# Patient Record
Sex: MALE | Race: WHITE | NOT HISPANIC OR LATINO | Employment: UNEMPLOYED | URBAN - NONMETROPOLITAN AREA
[De-identification: names, ages, dates, MRNs, and addresses within clinical notes are randomized per-mention and may not be internally consistent; named-entity substitution may affect disease eponyms.]

---

## 2017-02-01 ENCOUNTER — COMMUNICATION - GICH (OUTPATIENT)
Dept: ORTHOPEDICS | Facility: OTHER | Age: 57
End: 2017-02-01

## 2017-03-16 ENCOUNTER — AMBULATORY - GICH (OUTPATIENT)
Dept: FAMILY MEDICINE | Facility: OTHER | Age: 57
End: 2017-03-16

## 2017-03-16 ENCOUNTER — COMMUNICATION - GICH (OUTPATIENT)
Dept: FAMILY MEDICINE | Facility: OTHER | Age: 57
End: 2017-03-16

## 2017-03-16 DIAGNOSIS — F32.9 MAJOR DEPRESSIVE DISORDER, SINGLE EPISODE: ICD-10-CM

## 2017-03-22 ENCOUNTER — HISTORY (OUTPATIENT)
Dept: EMERGENCY MEDICINE | Facility: OTHER | Age: 57
End: 2017-03-22

## 2017-03-30 ENCOUNTER — AMBULATORY - GICH (OUTPATIENT)
Dept: FAMILY MEDICINE | Facility: OTHER | Age: 57
End: 2017-03-30

## 2017-04-10 ENCOUNTER — OFFICE VISIT - GICH (OUTPATIENT)
Dept: FAMILY MEDICINE | Facility: OTHER | Age: 57
End: 2017-04-10

## 2017-04-10 ENCOUNTER — HISTORY (OUTPATIENT)
Dept: FAMILY MEDICINE | Facility: OTHER | Age: 57
End: 2017-04-10

## 2017-04-10 DIAGNOSIS — F32.9 MAJOR DEPRESSIVE DISORDER, SINGLE EPISODE: ICD-10-CM

## 2017-04-10 DIAGNOSIS — Z48.02 ENCOUNTER FOR REMOVAL OF SUTURES: ICD-10-CM

## 2017-04-10 DIAGNOSIS — K21.9 GASTRO-ESOPHAGEAL REFLUX DISEASE WITHOUT ESOPHAGITIS: ICD-10-CM

## 2017-04-10 ASSESSMENT — PATIENT HEALTH QUESTIONNAIRE - PHQ9: SUM OF ALL RESPONSES TO PHQ QUESTIONS 1-9: 7

## 2017-04-18 ENCOUNTER — COMMUNICATION - GICH (OUTPATIENT)
Dept: ORTHOPEDICS | Facility: OTHER | Age: 57
End: 2017-04-18

## 2017-05-02 ENCOUNTER — HISTORY (OUTPATIENT)
Dept: EMERGENCY MEDICINE | Facility: OTHER | Age: 57
End: 2017-05-02

## 2017-11-06 ENCOUNTER — HISTORY (OUTPATIENT)
Dept: PEDIATRICS | Facility: OTHER | Age: 57
End: 2017-11-06

## 2017-11-06 ENCOUNTER — OFFICE VISIT - GICH (OUTPATIENT)
Dept: PEDIATRICS | Facility: OTHER | Age: 57
End: 2017-11-06

## 2017-11-06 DIAGNOSIS — Z23 ENCOUNTER FOR IMMUNIZATION: ICD-10-CM

## 2017-11-06 DIAGNOSIS — K57.32 DIVERTICULITIS OF LARGE INTESTINE WITHOUT PERFORATION OR ABSCESS WITHOUT BLEEDING: ICD-10-CM

## 2017-11-06 DIAGNOSIS — K59.00 CONSTIPATION: ICD-10-CM

## 2017-11-06 DIAGNOSIS — F10.99 ALCOHOL USE WITH ALCOHOL-INDUCED DISORDER (H): ICD-10-CM

## 2017-11-06 ASSESSMENT — PATIENT HEALTH QUESTIONNAIRE - PHQ9: SUM OF ALL RESPONSES TO PHQ QUESTIONS 1-9: 2

## 2017-11-27 ENCOUNTER — AMBULATORY - GICH (OUTPATIENT)
Dept: FAMILY MEDICINE | Facility: OTHER | Age: 57
End: 2017-11-27

## 2017-12-11 ENCOUNTER — OFFICE VISIT - GICH (OUTPATIENT)
Dept: FAMILY MEDICINE | Facility: OTHER | Age: 57
End: 2017-12-11

## 2017-12-11 ENCOUNTER — HISTORY (OUTPATIENT)
Dept: FAMILY MEDICINE | Facility: OTHER | Age: 57
End: 2017-12-11

## 2017-12-11 DIAGNOSIS — R10.32 LEFT LOWER QUADRANT PAIN: ICD-10-CM

## 2017-12-11 LAB
A/G RATIO - HISTORICAL: 0.9 (ref 1–2)
ABSOLUTE BASOPHILS - HISTORICAL: 0 THOU/CU MM
ABSOLUTE EOSINOPHILS - HISTORICAL: 0.1 THOU/CU MM
ABSOLUTE IMMATURE GRANULOCYTES(METAS,MYELOS,PROS) - HISTORICAL: 0 THOU/CU MM
ABSOLUTE LYMPHOCYTES - HISTORICAL: 1.3 THOU/CU MM (ref 0.9–2.9)
ABSOLUTE MONOCYTES - HISTORICAL: 1 THOU/CU MM
ABSOLUTE NEUTROPHILS - HISTORICAL: 4.9 THOU/CU MM (ref 1.7–7)
ALBUMIN SERPL-MCNC: 3.9 G/DL (ref 3.5–5.7)
ALP SERPL-CCNC: 72 IU/L (ref 34–104)
ALT (SGPT) - HISTORICAL: 16 IU/L (ref 7–52)
ANION GAP - HISTORICAL: 11 (ref 5–18)
AST SERPL-CCNC: 19 IU/L (ref 13–39)
BASOPHILS # BLD AUTO: 0.5 %
BILIRUB SERPL-MCNC: 0.4 MG/DL (ref 0.3–1)
BUN SERPL-MCNC: 16 MG/DL (ref 7–25)
BUN/CREAT RATIO - HISTORICAL: 12
CALCIUM SERPL-MCNC: 8.7 MG/DL (ref 8.6–10.3)
CHLORIDE SERPLBLD-SCNC: 98 MMOL/L (ref 98–107)
CO2 SERPL-SCNC: 24 MMOL/L (ref 21–31)
CREAT SERPL-MCNC: 1.38 MG/DL (ref 0.7–1.3)
EOSINOPHIL NFR BLD AUTO: 1.4 %
ERYTHROCYTE [DISTWIDTH] IN BLOOD BY AUTOMATED COUNT: 20 % (ref 11.5–15.5)
GFR IF NOT AFRICAN AMERICAN - HISTORICAL: 53 ML/MIN/1.73M2
GLOBULIN - HISTORICAL: 4.2 G/DL (ref 2–3.7)
GLUCOSE SERPL-MCNC: 92 MG/DL (ref 70–105)
HCT VFR BLD AUTO: 34.4 % (ref 37–53)
HEMOGLOBIN: 10.7 G/DL (ref 13.5–17.5)
IMMATURE GRANULOCYTES(METAS,MYELOS,PROS) - HISTORICAL: 0.1 %
LYMPHOCYTES NFR BLD AUTO: 18 % (ref 20–44)
MCH RBC QN AUTO: 21.5 PG (ref 26–34)
MCHC RBC AUTO-ENTMCNC: 31.1 G/DL (ref 32–36)
MCV RBC AUTO: 69 FL (ref 80–100)
MONOCYTES NFR BLD AUTO: 13.1 %
NEUTROPHILS NFR BLD AUTO: 66.9 % (ref 42–72)
PLATELET # BLD AUTO: 345 THOU/CU MM (ref 140–440)
PMV BLD: 9.6 FL (ref 6.5–11)
POTASSIUM SERPL-SCNC: 3.9 MMOL/L (ref 3.5–5.1)
PROT SERPL-MCNC: 8.1 G/DL (ref 6.4–8.9)
RED BLOOD COUNT - HISTORICAL: 4.97 MIL/CU MM (ref 4.3–5.9)
SODIUM SERPL-SCNC: 133 MMOL/L (ref 133–143)
WHITE BLOOD COUNT - HISTORICAL: 7.3 THOU/CU MM (ref 4.5–11)

## 2017-12-19 ENCOUNTER — HOSPITAL ENCOUNTER (OUTPATIENT)
Dept: RADIOLOGY | Facility: OTHER | Age: 57
End: 2017-12-19
Attending: FAMILY MEDICINE

## 2017-12-19 DIAGNOSIS — R10.32 LEFT LOWER QUADRANT PAIN: ICD-10-CM

## 2017-12-27 NOTE — PROGRESS NOTES
Patient Information     Patient Name MRN Sex Augie Alan [id number] Male 1960      Progress Notes by Eric Kaiser MD at 2017  3:15 PM     Author:  Eric Kaiser MD Service:  (none) Author Type:  Physician     Filed:  2017  4:09 PM Encounter Date:  2017 Status:  Signed     :  Eric Kaiser MD (Physician)            Subjective  Augie De Jesus is a 57 y.o. male who presents for left-sided abdominal pain. Present about a week or so. It's a constant pain along the left side of his abdomen. Worse with lifting his leg and better with nothing. No change with eating or bowel movement. No fall or trauma. No fever. No vomiting. No dysuria. He reports drinking alcohol last weekend. Whenever he drinks usually gets pain in the right upper quadrant which goes away. He has a bowel movement every day which she's had to push more. No red or black stools. He has a hemorrhoid. He's not yet had his first screening colonoscopy. His sister recently passed away from cancer and so he's thinking about worst case scenarios.    Problem List/PMH: reviewed in EMR, and made relevant updates today.  Medications: reviewed in EMR, and made relevant updates today.  Allergies: reviewed in EMR, and made relevant updates today.    Social Hx:  Social History      Substance Use Topics        Smoking status:  Never Smoker     Smokeless tobacco:  Never Used     Alcohol use  0.0 oz/week      0 Standard drinks or equivalent per week       Social History Narrative    History of alcoholism and DWIs, recurrent treatment at [facility].                      I reviewed social history and made relevant updates today.    Family Hx:   Family History       Problem   Relation Age of Onset     Heart Disease  Mother       heart failure       Alcohol/Drug  Father      Cancer  Sister      uterine cancer         Objective  Vitals: reviewed in EMR.  BP (!) 152/108  Pulse (!) 114  Temp 99.1  F (37.3  C) (Tympanic)   Ht 1.676 m  "(5' 6\")  Wt 91.9 kg (202 lb 9.6 oz)  BMI 32.7 kg/m2    Gen: Pleasant male, NAD.  HEENT: MMM  Neck: Supple  Pulm: Breathing easily  Neuro: Grossly intact  Skin: No concerning lesions.  Psychiatric: Normal affect and insight. Does not appear anxious or depressed.  Abdomen: Soft, nondistended. No rebound or guarding. Bowel sounds are present. Mild tenderness to palpation in the right upper quadrant without masses. Tenderness along the left lateral abdomen extending towards the left lower quadrant.      Assessment    ICD-10-CM    1. Diverticulitis of large intestine, unspecified bleeding status, unspecified complication status K57.32 ciprofloxacin HCl (CIPRO) 500 mg tablet      metroNIDAZOLE (FLAGYL) 500 mg tablet   2. Constipation, unspecified constipation type K59.00 polyethylene glycoL (MIRALAX) 17 gram/dose powder   3. Need for vaccination Z23 PNEUMOCOCCAL VACCINE 23-VALENT => 1 YO IM      FLU VACCINE => 3 YRS PF QUADRIVALENT IIV4 IM   4. Alcohol use disorder (HC) F10.99        I think he has both constipation and likely some diverticulitis. I recommend a course of antibiotics and if not improving return for repeat evaluation as further testing including CT abdomen would be warranted looking for abscess.    Plan   -- Expected clinical course discussed   -- Medications and their side effects discussed  Patient Instructions    -- Cipro & Flagyl x 10 days   -- Eat yogurt 1-2 times per day while on antibiotics (and for a few weeks after) to reduce the chances of diarrhea     -- Start polyethylene glycol (MiraLax) 2 capful two times a day for a few days, then cut back dose.  Most likely you'll be using 1 capful daily after a few days   -- Progression will be small hard pellet stool, hard log stool, soft stool.  Expect some liquid stool as well.  Goal soft formed daily stool (applesauce/peanutbutter consistency)   -- After 2-3 days, if you still feel constipated the next step up is to add Senna 1-2 tablets twice a day   " -- Use MiraLax daily for a month to allow colon to regain strength   -- Drink more water   -- Eat more fruits and vegetables   -- No fiber supplements until at least 1 month out.  Fiber containing foods okay.   -- MiraLax is safe to use indefinitely   -- After 1 month, consider switching from MiraLax to daily fiber supplement (eg Citrucel 1 tbsp daily).     -- Flu and pneumonia 23 shots today   -- Consider your screening colonoscopy    Return if symptoms worsen or fail to improve.    Signed, Eric Kaiser MD  Internal Medicine & Pediatrics

## 2017-12-28 NOTE — PATIENT INSTRUCTIONS
Patient Information     Patient Name MRN Sex Augie Alan [id number] Male 1960      Patient Instructions by Eric Kaiser MD at 2017  3:15 PM     Author:  Eric Kaiser MD  Service:  (none) Author Type:  Physician     Filed:  2017  3:54 PM  Encounter Date:  2017 Status:  Addendum     :  Eric Kaiser MD (Physician)        Related Notes: Original Note by Eric Kaiser MD (Physician) filed at 2017  3:51 PM             -- Cipro & Flagyl x 10 days   -- Eat yogurt 1-2 times per day while on antibiotics (and for a few weeks after) to reduce the chances of diarrhea     -- Start polyethylene glycol (MiraLax) 2 capful two times a day for a few days, then cut back dose.  Most likely you'll be using 1 capful daily after a few days   -- Progression will be small hard pellet stool, hard log stool, soft stool.  Expect some liquid stool as well.  Goal soft formed daily stool (applesauce/peanutbutter consistency)   -- After 2-3 days, if you still feel constipated the next step up is to add Senna 1-2 tablets twice a day   -- Use MiraLax daily for a month to allow colon to regain strength   -- Drink more water   -- Eat more fruits and vegetables   -- No fiber supplements until at least 1 month out.  Fiber containing foods okay.   -- MiraLax is safe to use indefinitely   -- After 1 month, consider switching from MiraLax to daily fiber supplement (eg Citrucel 1 tbsp daily).     -- Flu and pneumonia 23 shots today   -- Consider your screening colonoscopy

## 2017-12-30 NOTE — NURSING NOTE
Patient Information     Patient Name MRN Augie Mejia [id number] Male 1960      Nursing Note by Татьяна Vasquez at 2017  3:15 PM     Author:  Татьяна Vasquez Service:  (none) Author Type:  (none)     Filed:  2017  3:45 PM Encounter Date:  2017 Status:  Signed     :  Татьяна Vasquez            Patient presents to clinic for left sided abdominal pain that began over a week ago.  Denies fevers, constipation, vomiting or diarrhea.  Татьяна Vasquez LPN ....................  2017   3:20 PM

## 2018-01-02 ENCOUNTER — AMBULATORY - GICH (OUTPATIENT)
Dept: FAMILY MEDICINE | Facility: OTHER | Age: 58
End: 2018-01-02

## 2018-01-03 NOTE — TELEPHONE ENCOUNTER
Patient Information     Patient Name MRN Sex Aguie Aaln [id number] Male 1960      Telephone Encounter by Kristopher Martino RN at 3/16/2017 10:59 AM     Author:  Kristopher Martino RN Service:  (none) Author Type:  NURS- Registered Nurse     Filed:  3/16/2017 11:12 AM Encounter Date:  3/16/2017 Status:  Signed     :  Kristopher Martino RN (NURS- Registered Nurse)            This is a Refill request from: [facility]  Name of Medication: Cymbalta  Quantity requested: 30 capsules with 4 refills  Last fill date: 2/3/17  Due for refill: Yes, as per rx request  Last visit with PATRIZIA POWELL was on: 2016 in [facility]  PCP:  Patrizia Powell MD  Controlled Substance Agreement:  N/A   Diagnosis r/t this medication request: Unknown    Name of Medication: Lamictal  Quantity requested: 60 tabs with 4 refills  Last fill date: Unknown  Due for refill: Yes, as per patient medical messaging  Diagnosis r/t this medication request: Unknown    Chart review shows that last office visit with PCP noted to be on 16. Medications as listed above not noted in office visit notes on that date. However, patient was seen for a preop on 16 by PCP. Both requested medications are listed as historical on chart/active med list. Medical messaging on 3/16/17 from patient shows that patient is in need of a refill of lamictal and cymbalta. Will send rx request to pcp for his consideration and approval.     Unable to complete prescription refill per RN Medication Refill Policy.................... Kristopher Martino RN ....................  3/16/2017   11:00 AM

## 2018-01-18 ENCOUNTER — AMBULATORY - GICH (OUTPATIENT)
Dept: FAMILY MEDICINE | Facility: OTHER | Age: 58
End: 2018-01-18

## 2018-01-27 VITALS
WEIGHT: 199.6 LBS | DIASTOLIC BLOOD PRESSURE: 99 MMHG | HEIGHT: 66 IN | HEART RATE: 118 BPM | BODY MASS INDEX: 32.08 KG/M2 | SYSTOLIC BLOOD PRESSURE: 148 MMHG

## 2018-01-27 VITALS
HEART RATE: 114 BPM | HEIGHT: 66 IN | SYSTOLIC BLOOD PRESSURE: 150 MMHG | DIASTOLIC BLOOD PRESSURE: 98 MMHG | WEIGHT: 202.6 LBS | BODY MASS INDEX: 32.56 KG/M2 | TEMPERATURE: 99.1 F

## 2018-01-30 ASSESSMENT — PATIENT HEALTH QUESTIONNAIRE - PHQ9
SUM OF ALL RESPONSES TO PHQ QUESTIONS 1-9: 2
SUM OF ALL RESPONSES TO PHQ QUESTIONS 1-9: 7

## 2018-02-01 ENCOUNTER — DOCUMENTATION ONLY (OUTPATIENT)
Dept: FAMILY MEDICINE | Facility: OTHER | Age: 58
End: 2018-02-01

## 2018-02-01 RX ORDER — POLYETHYLENE GLYCOL 3350 17 G/17G
17 POWDER, FOR SOLUTION ORAL DAILY PRN
COMMUNITY
Start: 2017-11-06 | End: 2019-04-23

## 2018-02-01 RX ORDER — DULOXETIN HYDROCHLORIDE 60 MG/1
60 CAPSULE, DELAYED RELEASE ORAL DAILY
COMMUNITY
Start: 2017-04-10 | End: 2018-05-10

## 2018-02-01 RX ORDER — NICOTINE POLACRILEX 4 MG/1
20 GUM, CHEWING ORAL DAILY
COMMUNITY
Start: 2017-04-10 | End: 2018-04-13

## 2018-02-01 RX ORDER — TRAZODONE HYDROCHLORIDE 50 MG/1
50-100 TABLET, FILM COATED ORAL AT BEDTIME
COMMUNITY
Start: 2015-09-15 | End: 2019-04-23

## 2018-02-01 RX ORDER — ACETAMINOPHEN 500 MG
1000 TABLET ORAL 3 TIMES DAILY
COMMUNITY
Start: 2016-05-23 | End: 2019-04-23

## 2018-02-01 RX ORDER — IBUPROFEN 600 MG/1
600 TABLET, FILM COATED ORAL EVERY 6 HOURS
COMMUNITY
Start: 2016-05-23 | End: 2019-04-23

## 2018-02-09 VITALS
SYSTOLIC BLOOD PRESSURE: 128 MMHG | WEIGHT: 203 LBS | BODY MASS INDEX: 32.77 KG/M2 | HEART RATE: 108 BPM | DIASTOLIC BLOOD PRESSURE: 82 MMHG

## 2018-02-12 NOTE — PROGRESS NOTES
Patient Information     Patient Name MRN Sex Augie Alan [id number] Male 1960      Progress Notes by Carmenza Irizarry at 2017  9:55 AM     Author:  Carmenza Irizarry Service:  (none) Author Type:  Other Clinical Staff     Filed:  2017  9:55 AM Date of Service:  2017  9:55 AM Status:  Signed     :  Carmenza Irizarry (Other Clinical Staff)            1.  Has the patient had a previous reaction to IV contrast? No    2.  Does the patient have kidney disease? No    3.  Is the patient on dialysis? No    If YES to any of these questions, exam will be reviewed with a Radiologist before administering contrast.

## 2018-02-12 NOTE — PROGRESS NOTES
Patient Information     Patient Name MRN Sex Augie Alan [id number] Male 1960      Progress Notes by Carmenza Irizarry at 2017  9:55 AM     Author:  Carmenza Irizarry Service:  (none) Author Type:  Other Clinical Staff     Filed:  2017  9:55 AM Date of Service:  2017  9:55 AM Status:  Signed     :  Carmenza Irizarry (Other Clinical Staff)            Falls Risk Criteria:    Age 65 and older or under age 4        Sensory deficits    Poor vision    Use of ambulatory aides    Impaired judgment    Unable to walk independently    Meets High Risk criteria for falls:  no

## 2018-02-12 NOTE — NURSING NOTE
Patient Information     Patient Name MRN Sex Augie Alan [id number] Male 1960      Nursing Note by Nasrin Riddle at 2017  2:45 PM     Author:  Nasrin Riddle Service:  (none) Author Type:  (none)     Filed:  2017  3:00 PM Encounter Date:  2017 Status:  Signed     :  Nasrin Riddle            Patient presents today with left sided abd pain. He was see by Dr Kaiser for this on , but is still having pain.  Nasrin Riddle LPN .............2017  2:45 PM

## 2018-02-12 NOTE — PROGRESS NOTES
Patient Information     Patient Name MRN Sex Augie Alan [id number] Male 1960      Progress Notes by Thiago Ocasio MD at 2017  2:45 PM     Author:  Thiago Ocasio MD Service:  (none) Author Type:  Physician     Filed:  2017  3:05 PM Encounter Date:  2017 Status:  Signed     :  Thiago Ocasio MD (Physician)            SUBJECTIVE:  Augie De Jesus is a 57 y.o. male here for follow-up. He was seen at the end of November for left lower quadrant pain. It was thought that he had diverticulitis at that time. He completed Flagyl and Cipro which seemed to help however his symptoms have since returned. He feels a stabbing sensation in his left lower quadrant that is worsened with certain movements. He has notany change in his bowels or bladder. No previous history of abdominal surgeries. No fevers or chills.      Patient Active Problem List      Diagnosis Date Noted     CMC arthritis-bilateral 10/24/2016     Primary osteoarthritis of both first carpometacarpal joints 10/20/2016     Status post total right knee replacement 2016     YARELIS (obstructive sleep apnea) 2015     S/P total knee arthroplasty 2015     Medial meniscus tear 2015     Chondromalacia of knee 2015     EXTERNAL HEMORRHOIDS 2011     GASTROESOPHAGEAL REFLUX DISEASE 10/15/2010     HYPERTENSION 2010     Osteoarthrosis, unspecified whether generalized or localized, involving lower leg 2010     DEPRESSION, MAJOR 2010       Past Medical History:     Diagnosis  Date     Alcoholism (HC)     treatment fall .      MORALES, SECOND DEGREE, MULTIPLE SITES 3/22/2012     Chondromalacia of knee 2015     CMC arthritis-bilateral 10/24/2016     DEPRESSION, MAJOR 3/24/2010     EXTERNAL HEMORRHOIDS 2011     GASTROESOPHAGEAL REFLUX DISEASE 10/15/2010     HYPERTENSION 2010     Medial meniscus tear 2015     YARELIS (obstructive sleep apnea)      uses CPAP       OSTEOARTHRITIS, KNEES, BILATERAL 4/28/2010     Pancreatitis, alcoholic 07/2009    Pancreatitis July 2009, likely related to alcohol      Primary osteoarthritis of both first carpometacarpal joints 10/20/2016     SCIATICA, RIGHT 3/24/2010     SINUS TACHYCARDIA 5/4/2011       Past Surgical History:      Procedure  Laterality Date     KNEE REPLACEMENT Left 6/25/15    Knee Replacement, Total Dr. Hernandez         Current Outpatient Prescriptions       Medication  Sig Dispense Refill     acetaminophen (TYLENOL EXTRA STRGTH) 500 mg tablet Take 2 tablets by mouth 3 times daily. Max acetaminophen dose: 4000mg in 24 hrs. 100 tablet 3     DULoxetine (CYMBALTA) 60 mg Delayed-release capsule Take 1 capsule by mouth once daily. 30 capsule 11     ibuprofen (ADVIL; MOTRIN) 600 mg tablet Take 1 tablet by mouth every 6 hours. Maximum of 3200 mg in 24 hours. 100 tablet 3     Omeprazole 20 mg tablet Take 1 tablet by mouth once daily. 90 tablet 3     polyethylene glycoL (MIRALAX) 17 gram/dose powder Take 17 g by mouth once daily if needed for Constipation. 1 jar 11     traZODone (DESYREL) 50 mg tablet Take 1-2 tablets by mouth at bedtime.  0     No current facility-administered medications for this visit.      Medications have been reviewed by me and are current to the best of my knowledge and ability.      Allergies:  No Known Allergies    Family History       Problem   Relation Age of Onset     Heart Disease  Mother       heart failure       Alcohol/Drug  Father      Cancer  Sister      uterine cancer         Social History      Substance Use Topics        Smoking status:  Never Smoker     Smokeless tobacco:  Never Used     Alcohol use  0.0 oz/week     0 Standard drinks or equivalent per week        ROS:    As above otherwise ROS is unremarkable.      OBJECTIVE:  /82  Pulse (!) 108  Wt 92.1 kg (203 lb)  BMI 32.77 kg/m2    EXAM:  General Appearance: Pleasant, alert, appropriate appearance for age. No acute  distress  Gastrointestinal: He has tenderness to palpation over his left lower quadrant. No rebound or guarding. No hepatosplenomegaly. Positive bowel sounds. No CVA tenderness.    ASSESSEMENT AND PLAN:    Augie was seen today for follow up.    Diagnoses and all orders for this visit:    LLQ pain  -     CBC WITH DIFFERENTIAL; Future  -     COMPLETE METABOLIC PANEL; Future  -     CT ABDOMEN PELVIS W; Future     we'll check a blood count, specifically looking at his white count. We'll also get kidney function. Due to him not completely improving despite using antibiotics will get a CT scan of his abdomen and pelvis. I'll contact him with all these results when they return.    She also needs to have his stress test rescheduled.       Berto Ocasio MD

## 2018-02-20 ENCOUNTER — DOCUMENTATION ONLY (OUTPATIENT)
Dept: FAMILY MEDICINE | Facility: OTHER | Age: 58
End: 2018-02-20

## 2018-02-22 ENCOUNTER — OFFICE VISIT (OUTPATIENT)
Dept: FAMILY MEDICINE | Facility: OTHER | Age: 58
End: 2018-02-22
Attending: FAMILY MEDICINE
Payer: COMMERCIAL

## 2018-02-22 VITALS
TEMPERATURE: 98.8 F | SYSTOLIC BLOOD PRESSURE: 139 MMHG | BODY MASS INDEX: 33.49 KG/M2 | HEIGHT: 66 IN | WEIGHT: 208.4 LBS | DIASTOLIC BLOOD PRESSURE: 88 MMHG | HEART RATE: 100 BPM

## 2018-02-22 DIAGNOSIS — Z01.818 PREOP GENERAL PHYSICAL EXAM: Primary | ICD-10-CM

## 2018-02-22 DIAGNOSIS — G47.33 OSA (OBSTRUCTIVE SLEEP APNEA): ICD-10-CM

## 2018-02-22 DIAGNOSIS — I10 ESSENTIAL HYPERTENSION: ICD-10-CM

## 2018-02-22 PROBLEM — F10.21 ALCOHOL DEPENDENCE IN REMISSION (H): Status: ACTIVE | Noted: 2018-02-22

## 2018-02-22 PROCEDURE — 99215 OFFICE O/P EST HI 40 MIN: CPT | Performed by: FAMILY MEDICINE

## 2018-02-22 PROCEDURE — G0463 HOSPITAL OUTPT CLINIC VISIT: HCPCS

## 2018-02-22 ASSESSMENT — ANXIETY QUESTIONNAIRES
7. FEELING AFRAID AS IF SOMETHING AWFUL MIGHT HAPPEN: NOT AT ALL
1. FEELING NERVOUS, ANXIOUS, OR ON EDGE: NOT AT ALL
3. WORRYING TOO MUCH ABOUT DIFFERENT THINGS: NOT AT ALL
6. BECOMING EASILY ANNOYED OR IRRITABLE: NOT AT ALL
GAD7 TOTAL SCORE: 0
5. BEING SO RESTLESS THAT IT IS HARD TO SIT STILL: NOT AT ALL
IF YOU CHECKED OFF ANY PROBLEMS ON THIS QUESTIONNAIRE, HOW DIFFICULT HAVE THESE PROBLEMS MADE IT FOR YOU TO DO YOUR WORK, TAKE CARE OF THINGS AT HOME, OR GET ALONG WITH OTHER PEOPLE: NOT DIFFICULT AT ALL
2. NOT BEING ABLE TO STOP OR CONTROL WORRYING: NOT AT ALL

## 2018-02-22 ASSESSMENT — PATIENT HEALTH QUESTIONNAIRE - PHQ9: 5. POOR APPETITE OR OVEREATING: NOT AT ALL

## 2018-02-22 NOTE — PROGRESS NOTES
[facility]  [address] [address]  [phone]    PRE-OP EVALUATION:  Today's date: 2018    Augie De Jesus (: 1960) presents for pre-operative evaluation assessment as requested by Surgery.  He requires evaluation and anesthesia risk assessment prior to undergoing surgery/procedure.    Proposed Surgery/ Procedure: Colonoscopy  Date of Surgery/ Procedure: TBD  Time of Surgery/ Procedure: TBD  Hospital/Surgical Facility: [facility]  Primary Physician: Thiago Ocasio  Type of Anesthesia Anticipated: to be determined    Patient has a Health Care Directive or Living Will:  NO    1. NO - Do you have a history of heart attack, stroke, stent, bypass or surgery on an artery in the head, neck, heart or legs?  2. NO - Do you ever have any pain or discomfort in your chest?  3. NO - Do you have a history of  Heart Failure?  4. YES - Are you troubled by shortness of breath when: walking on the level, up a slight hill or at night?  5. NO - Do you currently have a cold, bronchitis or other respiratory infection?  6. NO - Do you have a cough, shortness of breath or wheezing?  7. NO - Do you sometimes get pains in the calves of your legs when you walk?  8. NO - Do you or anyone in your family have previous history of blood clots?  9. NO - Do you or does anyone in your family have a serious bleeding problem such as prolonged bleeding following surgeries or cuts?  10. NO - Have you ever had problems with anemia or been told to take iron pills?  11. NO - Have you had any abnormal blood loss such as black, tarry or bloody stools, or abnormal vaginal bleeding?  12. NO - Have you ever had a blood transfusion?  13. NO - Have you or any of your relatives ever had problems with anesthesia?  14. YES - Do you have sleep apnea, excessive snoring or daytime drowsiness?  15. NO - Do you have any prosthetic heart valves?  16. YES(bilateral knees) - Do you have prosthetic joints?  17. NO - Is there  any chance that you may be pregnant?      HPI:     HPI related to upcoming procedure: Colonoscopy    He is planning to go through a colonoscopy for screening purposes.  He has never had a colonoscopy before.    He has a history of sleep apnea.  He has tried 5 different masks and has not tolerated it well.  Currently he is not using anything.    He has a history of hypertension but has not had his lipids checked in the last couple of years.  He will follow-up for fasting lipids at his convenience.    MEDICAL HISTORY:     Patient Active Problem List    Diagnosis Date Noted     Alcohol dependence in remission (H) 02/22/2018     Priority: Medium     CMC arthritis 10/24/2016     Priority: Medium     Primary osteoarthritis of both first carpometacarpal joints 10/20/2016     Priority: Medium     Status post total right knee replacement 05/19/2016     Priority: Medium     YARELIS (obstructive sleep apnea) 06/23/2015     Priority: Medium     S/P total knee arthroplasty 06/23/2015     Priority: Medium     Chondromalacia of knee 04/20/2015     Priority: Medium     Medial meniscus tear 04/20/2015     Priority: Medium     External hemorrhoids 05/04/2011     Priority: Medium     Gastroesophageal reflux disease 10/15/2010     Priority: Medium     Hypertension 05/12/2010     Priority: Medium     Osteoarthrosis involving lower leg 04/28/2010     Priority: Medium     Depression, major 03/24/2010     Priority: Medium      Past Medical History:   Diagnosis Date     Alcohol-induced acute pancreatitis without infection or necrosis     07/2009,Pancreatitis July 2009, likely related to alcohol     Chondromalacia of knee     4/20/2015     Essential (primary) hypertension     5/12/2010     Gastro-esophageal reflux disease without esophagitis     10/15/2010     Major depressive disorder, single episode     3/24/2010     Obstructive sleep apnea     uses CPAP     Other specified cardiac arrhythmias (CODE)     5/4/2011     Other tear of medial  "meniscus, current injury, unspecified knee, initial encounter     4/20/2015     Personal history of other medical treatment (CODE)     3/22/2012     Primary osteoarthritis of both first carpometacarpal joints     10/20/2016     Primary osteoarthritis of hand     10/24/2016     Primary osteoarthritis of one knee     4/28/2010     Residual hemorrhoidal skin tags     5/4/2011     Sciatica     3/24/2010     Uncomplicated alcohol dependence (H)     2012,treatment fall 2012.     Past Surgical History:   Procedure Laterality Date     ARTHROPLASTY KNEE      6/25/15,Knee Replacement, Total Dr. Hernandez     Current Outpatient Prescriptions   Medication Sig Dispense Refill     acetaminophen (TYLENOL) 500 MG tablet Take 1,000 mg by mouth 3 times daily Max acetaminophen dose: 4000mg in 24 hrs       DULoxetine (CYMBALTA) 60 MG EC capsule Take 60 mg by mouth daily       ibuprofen (ADVIL/MOTRIN) 600 MG tablet Take 600 mg by mouth every 6 hours Maximum of 3200 mg in 24 hrs       omeprazole (RA OMEPRAZOLE) 20 MG tablet Take 20 mg by mouth daily       polyethylene glycol (MIRALAX/GLYCOLAX) powder Take 17 g by mouth daily as needed for constipation       traZODone (DESYREL) 50 MG tablet Take  mg by mouth At Bedtime       OTC products: None, except as noted above    No Known Allergies   Latex Allergy: NO    Social History   Substance Use Topics     Smoking status: Never Smoker     Smokeless tobacco: Never Used     Alcohol use 0.0 oz/week     History   Drug Use Not on file     Comment: Drug use: No       REVIEW OF SYSTEMS:   CONSTITUTIONAL: NEGATIVE for fever, chills, change in weight  ENT/MOUTH: NEGATIVE for ear, mouth and throat problems  RESP: NEGATIVE for significant cough or SOB  CV: NEGATIVE for chest pain, palpitations or peripheral edema    EXAM:   /88 (BP Location: Right arm, Patient Position: Sitting, Cuff Size: Adult Regular)  Pulse 100  Temp 98.8  F (37.1  C) (Oral)  Ht 5' 6\" (1.676 m)  Wt 208 lb 6.4 oz (94.5 " kg)  BMI 33.64 kg/m2    GENERAL APPEARANCE: healthy, alert and no distress     EYES: EOMI,  PERRL     HENT: ear canals and TM's normal and nose and mouth without ulcers or lesions     NECK: no adenopathy, no asymmetry, masses, or scars and thyroid normal to palpation     RESP: lungs clear to auscultation - no rales, rhonchi or wheezes     CV: regular rates and rhythm, normal S1 S2, no S3 or S4 and no murmur, click or rub     ABDOMEN:  soft, nontender, no HSM or masses and bowel sounds normal     MS: extremities normal- no gross deformities noted, no evidence of inflammation in joints, FROM in all extremities.     SKIN: no suspicious lesions or rashes     NEURO: Normal strength and tone, sensory exam grossly normal, mentation intact and speech normal     PSYCH: mentation appears normal. and affect normal/bright     LYMPHATICS: No cervical adenopathy    DIAGNOSTICS:   No labs or EKG required for low risk surgery (cataract, skin procedure, breast biopsy, etc)    Recent Labs   Lab Test  12/11/17   1539  12/11/17   1525  05/02/17   1607  05/02/17   1546  05/02/17   1541   03/22/17   2147   HGB   --   10.7*   --    --   12.6*   --    --    PLT   --   345   --    --   334   --    --    INR   --    --    --   1.1   --    --   1.1   NA  133   --   142   --    --    < >   --    POTASSIUM  3.9   --   3.8   --    --    < >   --    CR  1.38*   --   1.03   --    --    < >   --     < > = values in this interval not displayed.        IMPRESSION:       The proposed surgical procedure is considered LOW risk.    REVISED CARDIAC RISK INDEX  The patient has the following serious cardiovascular risks for perioperative complications such as (MI, PE, VFib and 3  AV Block):  No serious cardiac risks        ICD-10-CM    1. Preop general physical exam Z01.818 GASTROENTEROLOGY ADULT REF PROCEDURE ONLY Other ([place])   2. Essential hypertension I10 **Lipid Profile (Chol, Trig, HDL, LDL calc) anytime - FASTING   3. YARELIS (obstructive sleep  apnea) G47.33 SLEEP EVALUATION & MANAGEMENT REFERRAL - [facility]       RECOMMENDATIONS:     She will continue current medications and be n.p.o. after midnight.  He will stop anti-inflammatories 1 week prior to his procedure.    In regards to his sleep apnea will refer him back to a sleep specialist to discuss other treatment options as he has not tolerated several masks.    He will follow-up at his convenience for fasting lipids.       Signed Electronically by: Thiago Ocasio MD    Copy of this evaluation report is provided to requesting physician.    [facility] Preop Guidelines

## 2018-02-22 NOTE — MR AVS SNAPSHOT
After Visit Summary   2/22/2018    Augie De Jesus    MRN: [id number]           Patient Information     Date Of Birth          1960        Visit Information        Provider Department      2/22/2018 2:00 PM Thiago Ocasio MD [facility]        Today's Diagnoses     Preop general physical exam    -  1    Essential hypertension        YARELIS (obstructive sleep apnea)          Care Instructions      Before Your Surgery      Call your surgeon if there is any change in your health. This includes signs of a cold or flu (such as a sore throat, runny nose, cough, rash or fever).    Do not smoke, drink alcohol or take over the counter medicine (unless your surgeon or primary care doctor tells you to) for the 24 hours before and after surgery.    If you take prescribed drugs: Follow your doctor s orders about which medicines to take and which to stop until after surgery.    Eating and drinking prior to surgery: follow the instructions from your surgeon    Take a shower or bath the night before surgery. Use the soap your surgeon gave you to gently clean your skin. If you do not have soap from your surgeon, use your regular soap. Do not shave or scrub the surgery site.  Wear clean pajamas and have clean sheets on your bed.           Follow-ups after your visit        Additional Services     GASTROENTEROLOGY ADULT REF PROCEDURE ONLY Other ([place])       Last Lab Result: Creatinine (mg/dL)       Date                     Value                 12/11/2017               1.38 (H)         ----------  Body mass index is 33.64 kg/(m^2).     Needed:  No  Language:  English    Patient will be contacted to schedule procedure.     Please be aware that coverage of these services is subject to the terms and limitations of your health insurance plan.  Call member services at your health plan with any benefit or coverage questions.  Any procedures must be performed at a [facility] OR  coordinated by your clinic's referral office.    Please bring the following with you to your appointment:    (1) Any X-Rays, CTs or MRIs which have been performed.  Contact the facility where they were done to arrange for  prior to your scheduled appointment.    (2) List of current medications   (3) This referral request   (4) Any documents/labs given to you for this referral            SLEEP EVALUATION & MANAGEMENT REFERRAL - [facility] and [facility]       Please be aware that coverage of these services is subject to the terms and limitations of your health insurance plan.  Call member services at your health plan with any benefit or coverage questions.      Please bring the following to your appointment:    >>   List of current medications   >>   This referral request   >>   Any documents/labs given to you for this referral                      Future tests that were ordered for you today     Open Future Orders        Priority Expected Expires Ordered    SLEEP EVALUATION & MANAGEMENT REFERRAL - [facility] and [facility] Routine  2/22/2019 2/22/2018    **Lipid Profile (Chol, Trig, HDL, LDL calc) anytime - FASTING Routine 2/22/2018 2/22/2019 2/22/2018            Who to contact     If you have questions or need follow up information about today's clinic visit or your schedule please contact [facility] directly at [phone].  Normal or non-critical lab and imaging results will be communicated to you by MyChart, letter or phone within 4 business days after the clinic has received the results. If you do not hear from us within 7 days, please contact the clinic through MyChart or phone. If you have a critical or abnormal lab result, we will notify you by phone as soon as possible.  Submit refill requests through [employer] or call your pharmacy and they will forward the refill request to us. Please allow 3 business days for your refill  "to be completed.          Additional Information About Your Visit        [employer] Information     [email or web address] gives you secure access to your electronic health record. If you see a primary care provider, you can also send messages to your care team and make appointments. If you have questions, please call your primary care clinic.  If you do not have a primary care provider, please call [phone] and they will assist you.        Care EveryWhere ID     This is your Care EveryWhere ID. This could be used by other organizations to access your [place] [facility] medical records  [id number]        Your Vitals Were     Pulse Temperature Height BMI (Body Mass Index)          100 98.8  F (37.1  C) (Oral) 5' 6\" (1.676 m) 33.64 kg/m2         Blood Pressure from Last 3 Encounters:   02/22/18 139/88   12/11/17 128/82   11/06/17 (!) 150/98    Weight from Last 3 Encounters:   02/22/18 208 lb 6.4 oz (94.5 kg)   12/11/17 203 lb (92.1 kg)   11/06/17 202 lb 9.6 oz (91.9 kg)              We Performed the Following     GASTROENTEROLOGY ADULT REF PROCEDURE ONLY Other ([facility])        Primary Care Provider Office Phone # Fax #    Thiago Ocasio [phone] [address] [facility] [address]        Equal Access to Services     [facility] CenterPRINCE : Hadii aad ku hadasho Sobarbie, waaxda luqadaha, qaybta kaalmada rocky, meg oliveira . So [facility] [phone].    ATENCIÓN: Si habla español, tiene a peacock disposición servicios gratuitos de asistencia lingüística. Llame al [phone].    We comply with applicable federal civil rights laws and [place] laws. We do not discriminate on the basis of race, color, national origin, age, disability, sex, sexual orientation, or gender identity.            Thank you!     Thank you for choosing [facility] [place] [facility]  for your care. Our goal is always to provide you with excellent care. Hearing back from our patients is one way we can continue " to improve our services. Please take a few minutes to complete the written survey that you may receive in the mail after your visit with us. Thank you!             Your Updated Medication List - Protect others around you: Learn how to safely use, store and throw away your medicines at www.disposemymeds.org.          This list is accurate as of 2/22/18  2:29 PM.  Always use your most recent med list.                   Brand Name Dispense Instructions for use Diagnosis    acetaminophen 500 MG tablet    TYLENOL     Take 1,000 mg by mouth 3 times daily Max acetaminophen dose: 4000mg in 24 hrs        DULoxetine 60 MG EC capsule    CYMBALTA     Take 60 mg by mouth daily        ibuprofen 600 MG tablet    ADVIL/MOTRIN     Take 600 mg by mouth every 6 hours Maximum of 3200 mg in 24 hrs        polyethylene glycol powder    MIRALAX/GLYCOLAX     Take 17 g by mouth daily as needed for constipation        RA OMEPRAZOLE 20 MG tablet   Generic drug:  omeprazole      Take 20 mg by mouth daily        traZODone 50 MG tablet    DESYREL     Take  mg by mouth At Bedtime

## 2018-02-23 ASSESSMENT — PATIENT HEALTH QUESTIONNAIRE - PHQ9: SUM OF ALL RESPONSES TO PHQ QUESTIONS 1-9: 3

## 2018-02-23 ASSESSMENT — ANXIETY QUESTIONNAIRES: GAD7 TOTAL SCORE: 0

## 2018-02-28 ENCOUNTER — TELEPHONE (OUTPATIENT)
Dept: FAMILY MEDICINE | Facility: OTHER | Age: 58
End: 2018-02-28

## 2018-02-28 NOTE — TELEPHONE ENCOUNTER
Patient has been called and messages left on 02/23, 02/26 and 02/27 to schedule colonoscopy.  Letter has been sent out    today for him to call to schedule the colonoscopy. Rosalie Eaton on 2/28/2018 at 8:44 AM

## 2018-03-05 DIAGNOSIS — Z12.11 ENCOUNTER FOR SCREENING COLONOSCOPY: Primary | ICD-10-CM

## 2018-03-05 RX ORDER — BISACODYL 5 MG
TABLET, DELAYED RELEASE (ENTERIC COATED) ORAL
Qty: 2 TABLET | Refills: 0 | Status: ON HOLD | OUTPATIENT
Start: 2018-03-05 | End: 2018-03-16

## 2018-03-05 RX ORDER — POLYETHYLENE GLYCOL 3350, SODIUM CHLORIDE, SODIUM BICARBONATE, POTASSIUM CHLORIDE 420; 11.2; 5.72; 1.48 G/4L; G/4L; G/4L; G/4L
4000 POWDER, FOR SOLUTION ORAL ONCE
Qty: 4000 ML | Refills: 0 | Status: SHIPPED | OUTPATIENT
Start: 2018-03-05 | End: 2018-03-05

## 2018-03-05 NOTE — TELEPHONE ENCOUNTER
Screening Questions for the Scheduling of Screening Colonoscopies   (If Colonoscopy is diagnostic, Provider should review the chart before scheduling.)  Are you younger than 50 or older than 80?  NO   Do you take aspirin or fish oil?  NO  (if yes, tell patient to stop 1 week prior to Colonoscopy)  Do you take warfarin (Coumadin), clopidogrel (Plavix), apixaban (Eliquis), dabigatram (Pradaxa), rivaroxaban (Xarelto) or any blood thinner? NO   Do you use oxygen at home?  NO  Do you have kidney disease? NO   Are you on dialysis? NO   Have you had a stroke or heart attack in the last year? NO   Have you had a stent in your heart or any blood vessel in the last year? NO   Have you had a transplant of any organ? NO   Have you had a colonoscopy or upper endoscopy (EGD) before? NO         When?  NO  Date of scheduled Colonoscopy. 03./16/2018  Provider [facility]   Pharmacy [facility]

## 2018-03-16 ENCOUNTER — ANESTHESIA EVENT (OUTPATIENT)
Dept: SURGERY | Facility: OTHER | Age: 58
End: 2018-03-16
Payer: COMMERCIAL

## 2018-03-16 ENCOUNTER — HOSPITAL ENCOUNTER (OUTPATIENT)
Facility: OTHER | Age: 58
Discharge: HOME | End: 2018-03-16
Attending: SURGERY | Admitting: SURGERY
Payer: COMMERCIAL

## 2018-03-16 ENCOUNTER — ANESTHESIA (OUTPATIENT)
Dept: SURGERY | Facility: OTHER | Age: 58
End: 2018-03-16
Payer: COMMERCIAL

## 2018-03-16 ENCOUNTER — SURGERY (OUTPATIENT)
Age: 58
End: 2018-03-16

## 2018-03-16 VITALS
DIASTOLIC BLOOD PRESSURE: 81 MMHG | SYSTOLIC BLOOD PRESSURE: 119 MMHG | TEMPERATURE: 98.2 F | OXYGEN SATURATION: 100 % | RESPIRATION RATE: 16 BRPM

## 2018-03-16 PROCEDURE — 25000132 ZZH RX MED GY IP 250 OP 250 PS 637: Performed by: SURGERY

## 2018-03-16 PROCEDURE — 27210995 ZZH RX 272: Performed by: SURGERY

## 2018-03-16 PROCEDURE — 25000128 H RX IP 250 OP 636: Performed by: SURGERY

## 2018-03-16 PROCEDURE — G0121 COLON CA SCRN NOT HI RSK IND: HCPCS | Performed by: SURGERY

## 2018-03-16 PROCEDURE — 45378 DIAGNOSTIC COLONOSCOPY: CPT | Performed by: SURGERY

## 2018-03-16 PROCEDURE — 40000010 ZZH STATISTIC ANES STAT CODE-CRNA PER MINUTE: Performed by: SURGERY

## 2018-03-16 RX ORDER — SODIUM CHLORIDE, SODIUM LACTATE, POTASSIUM CHLORIDE, CALCIUM CHLORIDE 600; 310; 30; 20 MG/100ML; MG/100ML; MG/100ML; MG/100ML
INJECTION, SOLUTION INTRAVENOUS CONTINUOUS
Status: DISCONTINUED | OUTPATIENT
Start: 2018-03-16 | End: 2018-03-16 | Stop reason: HOSPADM

## 2018-03-16 RX ORDER — LIDOCAINE HYDROCHLORIDE 20 MG/ML
INJECTION, SOLUTION INFILTRATION; PERINEURAL PRN
Status: DISCONTINUED | OUTPATIENT
Start: 2018-03-16 | End: 2018-03-16

## 2018-03-16 RX ORDER — LIDOCAINE 40 MG/G
CREAM TOPICAL
Status: DISCONTINUED | OUTPATIENT
Start: 2018-03-16 | End: 2018-03-16 | Stop reason: HOSPADM

## 2018-03-16 RX ORDER — PROPOFOL 10 MG/ML
INJECTION, EMULSION INTRAVENOUS PRN
Status: DISCONTINUED | OUTPATIENT
Start: 2018-03-16 | End: 2018-03-16

## 2018-03-16 RX ORDER — ONDANSETRON 2 MG/ML
4 INJECTION INTRAMUSCULAR; INTRAVENOUS
Status: DISCONTINUED | OUTPATIENT
Start: 2018-03-16 | End: 2018-03-16 | Stop reason: HOSPADM

## 2018-03-16 RX ORDER — PROPOFOL 10 MG/ML
INJECTION, EMULSION INTRAVENOUS CONTINUOUS PRN
Status: DISCONTINUED | OUTPATIENT
Start: 2018-03-16 | End: 2018-03-16

## 2018-03-16 RX ORDER — FLUMAZENIL 0.1 MG/ML
0.2 INJECTION, SOLUTION INTRAVENOUS
Status: DISCONTINUED | OUTPATIENT
Start: 2018-03-16 | End: 2018-03-16 | Stop reason: HOSPADM

## 2018-03-16 RX ORDER — NALOXONE HYDROCHLORIDE 0.4 MG/ML
.1-.4 INJECTION, SOLUTION INTRAMUSCULAR; INTRAVENOUS; SUBCUTANEOUS
Status: DISCONTINUED | OUTPATIENT
Start: 2018-03-16 | End: 2018-03-16 | Stop reason: HOSPADM

## 2018-03-16 RX ORDER — SIMETHICONE
LIQUID (ML) MISCELLANEOUS PRN
Status: DISCONTINUED | OUTPATIENT
Start: 2018-03-16 | End: 2018-03-16 | Stop reason: HOSPADM

## 2018-03-16 RX ADMIN — PROPOFOL 50 MG: 10 INJECTION, EMULSION INTRAVENOUS at 09:28

## 2018-03-16 RX ADMIN — PROPOFOL 150 MG: 10 INJECTION, EMULSION INTRAVENOUS at 09:26

## 2018-03-16 RX ADMIN — SODIUM CHLORIDE, SODIUM LACTATE, POTASSIUM CHLORIDE, AND CALCIUM CHLORIDE: 600; 310; 30; 20 INJECTION, SOLUTION INTRAVENOUS at 08:37

## 2018-03-16 RX ADMIN — Medication 3 ML: at 09:45

## 2018-03-16 RX ADMIN — LIDOCAINE HYDROCHLORIDE 40 MG: 20 INJECTION, SOLUTION INFILTRATION; PERINEURAL at 09:25

## 2018-03-16 RX ADMIN — PROPOFOL 140 MCG/KG/MIN: 10 INJECTION, EMULSION INTRAVENOUS at 09:28

## 2018-03-16 RX ADMIN — WATER 250 ML: 100 IRRIGANT IRRIGATION at 09:45

## 2018-03-16 NOTE — H&P (VIEW-ONLY)
[facility]  [address] [address]  [phone]    PRE-OP EVALUATION:  Today's date: 2018    Augie De Jesus (: 1960) presents for pre-operative evaluation assessment as requested by Surgery.  He requires evaluation and anesthesia risk assessment prior to undergoing surgery/procedure.    Proposed Surgery/ Procedure: Colonoscopy  Date of Surgery/ Procedure: TBD  Time of Surgery/ Procedure: TBD  Hospital/Surgical Facility: [facility]  Primary Physician: Thiago Ocasio  Type of Anesthesia Anticipated: to be determined    Patient has a Health Care Directive or Living Will:  NO    1. NO - Do you have a history of heart attack, stroke, stent, bypass or surgery on an artery in the head, neck, heart or legs?  2. NO - Do you ever have any pain or discomfort in your chest?  3. NO - Do you have a history of  Heart Failure?  4. YES - Are you troubled by shortness of breath when: walking on the level, up a slight hill or at night?  5. NO - Do you currently have a cold, bronchitis or other respiratory infection?  6. NO - Do you have a cough, shortness of breath or wheezing?  7. NO - Do you sometimes get pains in the calves of your legs when you walk?  8. NO - Do you or anyone in your family have previous history of blood clots?  9. NO - Do you or does anyone in your family have a serious bleeding problem such as prolonged bleeding following surgeries or cuts?  10. NO - Have you ever had problems with anemia or been told to take iron pills?  11. NO - Have you had any abnormal blood loss such as black, tarry or bloody stools, or abnormal vaginal bleeding?  12. NO - Have you ever had a blood transfusion?  13. NO - Have you or any of your relatives ever had problems with anesthesia?  14. YES - Do you have sleep apnea, excessive snoring or daytime drowsiness?  15. NO - Do you have any prosthetic heart valves?  16. YES(bilateral knees) - Do you have prosthetic joints?  17. NO - Is there  any chance that you may be pregnant?      HPI:     HPI related to upcoming procedure: Colonoscopy    He is planning to go through a colonoscopy for screening purposes.  He has never had a colonoscopy before.    He has a history of sleep apnea.  He has tried 5 different masks and has not tolerated it well.  Currently he is not using anything.    He has a history of hypertension but has not had his lipids checked in the last couple of years.  He will follow-up for fasting lipids at his convenience.    MEDICAL HISTORY:     Patient Active Problem List    Diagnosis Date Noted     Alcohol dependence in remission (H) 02/22/2018     Priority: Medium     CMC arthritis 10/24/2016     Priority: Medium     Primary osteoarthritis of both first carpometacarpal joints 10/20/2016     Priority: Medium     Status post total right knee replacement 05/19/2016     Priority: Medium     YARELIS (obstructive sleep apnea) 06/23/2015     Priority: Medium     S/P total knee arthroplasty 06/23/2015     Priority: Medium     Chondromalacia of knee 04/20/2015     Priority: Medium     Medial meniscus tear 04/20/2015     Priority: Medium     External hemorrhoids 05/04/2011     Priority: Medium     Gastroesophageal reflux disease 10/15/2010     Priority: Medium     Hypertension 05/12/2010     Priority: Medium     Osteoarthrosis involving lower leg 04/28/2010     Priority: Medium     Depression, major 03/24/2010     Priority: Medium      Past Medical History:   Diagnosis Date     Alcohol-induced acute pancreatitis without infection or necrosis     07/2009,Pancreatitis July 2009, likely related to alcohol     Chondromalacia of knee     4/20/2015     Essential (primary) hypertension     5/12/2010     Gastro-esophageal reflux disease without esophagitis     10/15/2010     Major depressive disorder, single episode     3/24/2010     Obstructive sleep apnea     uses CPAP     Other specified cardiac arrhythmias (CODE)     5/4/2011     Other tear of medial  "meniscus, current injury, unspecified knee, initial encounter     4/20/2015     Personal history of other medical treatment (CODE)     3/22/2012     Primary osteoarthritis of both first carpometacarpal joints     10/20/2016     Primary osteoarthritis of hand     10/24/2016     Primary osteoarthritis of one knee     4/28/2010     Residual hemorrhoidal skin tags     5/4/2011     Sciatica     3/24/2010     Uncomplicated alcohol dependence (H)     2012,treatment fall 2012.     Past Surgical History:   Procedure Laterality Date     ARTHROPLASTY KNEE      6/25/15,Knee Replacement, Total Dr. Hernandez     Current Outpatient Prescriptions   Medication Sig Dispense Refill     acetaminophen (TYLENOL) 500 MG tablet Take 1,000 mg by mouth 3 times daily Max acetaminophen dose: 4000mg in 24 hrs       DULoxetine (CYMBALTA) 60 MG EC capsule Take 60 mg by mouth daily       ibuprofen (ADVIL/MOTRIN) 600 MG tablet Take 600 mg by mouth every 6 hours Maximum of 3200 mg in 24 hrs       omeprazole (RA OMEPRAZOLE) 20 MG tablet Take 20 mg by mouth daily       polyethylene glycol (MIRALAX/GLYCOLAX) powder Take 17 g by mouth daily as needed for constipation       traZODone (DESYREL) 50 MG tablet Take  mg by mouth At Bedtime       OTC products: None, except as noted above    No Known Allergies   Latex Allergy: NO    Social History   Substance Use Topics     Smoking status: Never Smoker     Smokeless tobacco: Never Used     Alcohol use 0.0 oz/week     History   Drug Use Not on file     Comment: Drug use: No       REVIEW OF SYSTEMS:   CONSTITUTIONAL: NEGATIVE for fever, chills, change in weight  ENT/MOUTH: NEGATIVE for ear, mouth and throat problems  RESP: NEGATIVE for significant cough or SOB  CV: NEGATIVE for chest pain, palpitations or peripheral edema    EXAM:   /88 (BP Location: Right arm, Patient Position: Sitting, Cuff Size: Adult Regular)  Pulse 100  Temp 98.8  F (37.1  C) (Oral)  Ht 5' 6\" (1.676 m)  Wt 208 lb 6.4 oz (94.5 " kg)  BMI 33.64 kg/m2    GENERAL APPEARANCE: healthy, alert and no distress     EYES: EOMI,  PERRL     HENT: ear canals and TM's normal and nose and mouth without ulcers or lesions     NECK: no adenopathy, no asymmetry, masses, or scars and thyroid normal to palpation     RESP: lungs clear to auscultation - no rales, rhonchi or wheezes     CV: regular rates and rhythm, normal S1 S2, no S3 or S4 and no murmur, click or rub     ABDOMEN:  soft, nontender, no HSM or masses and bowel sounds normal     MS: extremities normal- no gross deformities noted, no evidence of inflammation in joints, FROM in all extremities.     SKIN: no suspicious lesions or rashes     NEURO: Normal strength and tone, sensory exam grossly normal, mentation intact and speech normal     PSYCH: mentation appears normal. and affect normal/bright     LYMPHATICS: No cervical adenopathy    DIAGNOSTICS:   No labs or EKG required for low risk surgery (cataract, skin procedure, breast biopsy, etc)    Recent Labs   Lab Test  12/11/17   1539  12/11/17   1525  05/02/17   1607  05/02/17   1546  05/02/17   1541   03/22/17   2147   HGB   --   10.7*   --    --   12.6*   --    --    PLT   --   345   --    --   334   --    --    INR   --    --    --   1.1   --    --   1.1   NA  133   --   142   --    --    < >   --    POTASSIUM  3.9   --   3.8   --    --    < >   --    CR  1.38*   --   1.03   --    --    < >   --     < > = values in this interval not displayed.        IMPRESSION:       The proposed surgical procedure is considered LOW risk.    REVISED CARDIAC RISK INDEX  The patient has the following serious cardiovascular risks for perioperative complications such as (MI, PE, VFib and 3  AV Block):  No serious cardiac risks        ICD-10-CM    1. Preop general physical exam Z01.818 GASTROENTEROLOGY ADULT REF PROCEDURE ONLY Other ([place])   2. Essential hypertension I10 **Lipid Profile (Chol, Trig, HDL, LDL calc) anytime - FASTING   3. YARELIS (obstructive sleep  apnea) G47.33 SLEEP EVALUATION & MANAGEMENT REFERRAL - [facility]       RECOMMENDATIONS:     She will continue current medications and be n.p.o. after midnight.  He will stop anti-inflammatories 1 week prior to his procedure.    In regards to his sleep apnea will refer him back to a sleep specialist to discuss other treatment options as he has not tolerated several masks.    He will follow-up at his convenience for fasting lipids.       Signed Electronically by: Thiago Ocasio MD    Copy of this evaluation report is provided to requesting physician.    Cb Preop Guidelines

## 2018-03-16 NOTE — INTERVAL H&P NOTE
I saw and examined Augie De Jesus.  I have reviewed the history and physical and find no changes to the patient's medical status or condition with the exceptions noted below.     Jimmy Phelps   8:16 AM 3/16/2018

## 2018-03-16 NOTE — OP NOTE
PROCEDURE NOTE    SURGEON:Jimmy Phelps    PRE-OP DIAGNOSIS:  Screening Colonoscopy      POST-OP DIAGNOSIS: Diverticulosis,  Hemorrhoids    PROCEDURE:  Colonsocopy    SPECIMEN:  None    ANESTHESIA:  Monitor Anesthesia Care CRNA Independent: Breezy Kennedy APRN CRNA   Coverage requested.     ESTIMATED BLOOD LOSS: none    COMPLICATIONS:  None    INDICATION FOR THE PROCEDURE: The patient is a 57 year old male. The patient presents with need for screening and recent diverticulitis. I explained to thepatient the risks, benefits and alternatives to diagnostic colonoscopy for evaluating for polyps and colon cancer. We specifically discussed the risks of bleeding, infection, perforation, potential inability to reach the cecum and the risks of sedation. The patient's questions were answered and the patient wished to proceed. Informed consent paperwork was completed.    PROCEDURE: The patient was taken to the endoscopy suite. Appropriate monitors wereattached. The patient was placed in the left lateral decubitus position.Timeout was performed confirming the patient's identity and procedure to be performed.  After appropriate sedation was confirmed, digital rectal exam was performed.  There was normal tone and no gross abnormality was noted.  The lubricated colonoscope was introduced into the anus the colon was insufflated with air. The prep quality was adequate. Under direct visualization the scope was advanced to the cecum.  The mucosa ofcolon was inspected while withdrawing the scope. A minimal amount of diverticulosis was noted. The scope was retroflexed in the rectum and the anorectal junction was inspected. Mild hemorrhoids were noted. The scope was returned to aneutral position and the colon was decompressed. The scope was removed. The patient tolerated the procedure with no immediately apparent complication. The patient was taken to recovery in stable condition.    FOLLOW UP: RECOMMEND high fiber diet, 10 year  follow up.     Jimmy Phelps

## 2018-03-16 NOTE — ANESTHESIA POSTPROCEDURE EVALUATION
Patient: Augie De Jesus    Procedure(s):  Colonoscopy - Wound Class: III-Contaminated    Diagnosis:screening  Diagnosis Additional Information: No value filed.    Anesthesia Type:  MAC    Note:  Anesthesia Post Evaluation    Patient location during evaluation: Phase 2  Patient participation: Able to fully participate in evaluation  Level of consciousness: awake and alert  Pain management: adequate  Airway patency: patent  Cardiovascular status: acceptable  Respiratory status: acceptable  Hydration status: acceptable  PONV: none             Last vitals:  Vitals:    03/16/18 0825   BP: (!) 156/99   Temp: 97.3  F (36.3  C)   SpO2: 97%         Electronically Signed By: FEDERICO HOPE CRNA  March 16, 2018  10:09 AM

## 2018-03-16 NOTE — IP AVS SNAPSHOT
[facility]    [address]    [place] [address]    Phone:  [phone]    Fax:  [phone]                                       After Visit Summary   3/16/2018    Augie De Jesus    MRN: [id number]           After Visit Summary Signature Page     I have received my discharge instructions, and my questions have been answered. I have discussed any challenges I see with this plan with the nurse or doctor.    ..........................................................................................................................................  Patient/Patient Representative Signature      ..........................................................................................................................................  Patient Representative Print Name and Relationship to Patient    ..................................................               ................................................  Date                                            Time    ..........................................................................................................................................  Reviewed by Signature/Title    ...................................................              ..............................................  Date                                                            Time

## 2018-03-16 NOTE — IP AVS SNAPSHOT
MRN:[id number]                      After Visit Summary   3/16/2018    Augie De Jesus    MRN: [id number]           Thank you!     Thank you for choosing [facility] for your care. Our goal is always to provide you with excellent care. Hearing back from our patients is one way we can continue to improve our services. Please take a few minutes to complete the written survey that you may receive in the mail after you visit with us. Thank you!        Patient Information     Date Of Birth          1960        About your hospital stay     You were admitted on:  March 16, 2018 You last received care in the:  [facility]    You were discharged on:  March 16, 2018       Who to Call     For medical emergencies, please call 911.  For non-urgent questions about your medical care, please call your primary care provider or clinic, [phone]  For questions related to your surgery, please call your surgery clinic        Attending Provider     Provider Specialty    Jimmy Phelps MD Surgery       Primary Care Provider Office Phone # Fax #    Thiago Ocasio [phone]      After Care Instructions     Discharge Instructions       No driving or operating machinery until the day after procedure..postfiber            Discharge Instructions       Resume pre procedure diet and medications.  Your doctor recommends that you eat 25 to 30 Grams of fiber daily. The following are some examples of fiber amounts in different foods.    Fruits: Apple (with skin) 1 medium = 4.4 Grams   Banana      1 medium = 3.1 Grams   Oranges     1 orange = 3.1 Grams   Prunes     1 cup, pitted = 12.4 Grams    Juices: Apple, unsweetened w/ added ascorbic acid  1 cup = 0.5 Grams    Grapefruit, white, canned,sweetened  1 cup = 0.2 Grams    Grape, unsweetened w/ added ascorbic acid  1 cup = 0.5 Grams    Orange     1 cup = 0.7 Grams    Vegetables:   Cooked: Green Beans   1 cup = 4.0 Grams       Carrots    1/2 cup sliced = 2.3 Grams       Peas       1 cup = 8.8 Grams       Potato (baked, with skin)  1 medium = 3.8 Grams    Raw: Cucumber (with peel)  1 cucumber = 1.5 Grams            Lettuce     1 cup shredded = 0.5 Grams            Tomato   1 medium tomato = 1.5 Grams            Spinach  1 cup = 0.7 Grams    Legumes: Baked beans, canned, no salt added  1 cup = 13.9 Grams         Kidney Beans, canned  1 cup = 13.6 Grams         Wyatt Beans, canned     1 cup = 11.6 Grams         Lentils, boiled   1 cup = 15.6 Grams    Breads, Pastas, Flours: Bran muffins   1 medium muffin = 5.2 Grams           Oatmeal, cooked  1 cup = 4.0 Grams           White Bread   1 slice = 0.6 Grams           Whole- wheat bread = 1.9 Grams    Pasta and rice, cooked: Macaroni  1 cup = 2.5 Grams           Rice, Brown  1 cup = 3.5 Grams           Rice, white   1 cup = 0.6 Grams           Spaghetti (regular) 1 cup = 2.5 Grams    Nuts: Almonds   1 cup = 17.4 Grams            Peanuts    1 cup = 12.4 Grams            Discharge Instructions       Call [phone] for questions or concerns. Call for increased abdominal pain, rectal bleeding, persistent vomiting or fever over 101.5 F  You will receive a letter in about one week with results.                  Pending Results     No orders found from 3/14/2018 to 3/17/2018.            Admission Information     Date & Time Provider Department Dept. Phone    3/16/2018 Jimmy Phelps MD [facility] [phone]      Your Vitals Were     Blood Pressure Temperature Respirations Pulse Oximetry          156/99 (Cuff Size: Adult Regular) 97.9  F (36.6  C) (Temporal) 16 99%        MyChart Information     [employer] gives you secure access to your electronic health record. If you see a primary care provider, you can also send messages to your care team and make appointments. If you have questions, please call your primary care clinic.  If you do not have a primary care provider, please call [phone]  and they will assist you.        Care EveryWhere ID     This is your Care EveryWhere ID. This could be used by other organizations to access your [facility] medical records  [id number]        Equal Access to Services     LEROY JACKSON : Juan Carlos Granados, watylerda cherylcarlyha, qaaletheata kashawnda rocky, meg gabinoin hayaamila cottontay andersondaylin reaves. So [facility] [phone].    ATENCIÓN: Si habla español, tiene a peacock disposición servicios gratuitos de asistencia lingüística. Llame al [phone].    We comply with applicable federal civil rights laws and [place] laws. We do not discriminate on the basis of race, color, national origin, age, disability, sex, sexual orientation, or gender identity.               Review of your medicines      CONTINUE these medicines which have NOT CHANGED        Dose / Directions    acetaminophen 500 MG tablet   Commonly known as:  TYLENOL        Dose:  1000 mg   Take 1,000 mg by mouth 3 times daily Max acetaminophen dose: 4000mg in 24 hrs   Refills:  0       DULoxetine 60 MG EC capsule   Commonly known as:  CYMBALTA        Dose:  60 mg   Take 60 mg by mouth daily   Refills:  0       ibuprofen 600 MG tablet   Commonly known as:  ADVIL/MOTRIN        Dose:  600 mg   Take 600 mg by mouth every 6 hours Maximum of 3200 mg in 24 hrs   Refills:  0       polyethylene glycol powder   Commonly known as:  MIRALAX/GLYCOLAX        Dose:  17 g   Take 17 g by mouth daily as needed for constipation   Refills:  0       RA OMEPRAZOLE 20 MG tablet   Generic drug:  omeprazole        Dose:  20 mg   Take 20 mg by mouth daily   Refills:  0       traZODone 50 MG tablet   Commonly known as:  DESYREL        Dose:   mg   Take  mg by mouth At Bedtime   Refills:  0                Protect others around you: Learn how to safely use, store and throw away your medicines at www.disposemymeds.org.             Medication List: This is a list of all your medications and when to take them. Check marks below  indicate your daily home schedule. Keep this list as a reference.      Medications           Morning Afternoon Evening Bedtime As Needed    acetaminophen 500 MG tablet   Commonly known as:  TYLENOL   Take 1,000 mg by mouth 3 times daily Max acetaminophen dose: 4000mg in 24 hrs                                DULoxetine 60 MG EC capsule   Commonly known as:  CYMBALTA   Take 60 mg by mouth daily                                ibuprofen 600 MG tablet   Commonly known as:  ADVIL/MOTRIN   Take 600 mg by mouth every 6 hours Maximum of 3200 mg in 24 hrs                                polyethylene glycol powder   Commonly known as:  MIRALAX/GLYCOLAX   Take 17 g by mouth daily as needed for constipation                                RA OMEPRAZOLE 20 MG tablet   Take 20 mg by mouth daily   Generic drug:  omeprazole                                traZODone 50 MG tablet   Commonly known as:  DESYREL   Take  mg by mouth At Bedtime

## 2018-03-16 NOTE — OR NURSING
Pt has been discharged to home at 1100 via ambulatory accompanied by friend    Written discharge instructions were provided to Pt.  Prescriptions were N/A.      Patient and adult caring for them verbalize understanding of discharge instructions including no driving until tomorrow and no longer taking narcotic pain medications - no operating mechanical equipment and no making any important decisions.They understand reason for discharge, and necessary follow-up appointments.      Ghazala Camarena RN

## 2018-03-16 NOTE — ANESTHESIA CARE TRANSFER NOTE
Patient: Augie De Jesus    Procedure(s):  Colonoscopy - Wound Class: III-Contaminated    Diagnosis: screening  Diagnosis Additional Information: No value filed.    Anesthesia Type:   MAC     Note:  Airway :Room Air  Patient transferred to:Phase II  Handoff Report: Identifed the Patient, Identified the Reponsible Provider, Reviewed the pertinent medical history, Discussed the surgical course, Reviewed Intra-OP anesthesia mangement and issues during anesthesia, Set expectations for post-procedure period and Allowed opportunity for questions and acknowledgement of understanding      Vitals: (Last set prior to Anesthesia Care Transfer)              Electronically Signed By: FEDERICO HOPE CRNA  March 16, 2018  10:09 AM

## 2018-03-16 NOTE — ANESTHESIA PREPROCEDURE EVALUATION
Anesthesia Evaluation     .             ROS/MED HX    ENT/Pulmonary:     (+)sleep apnea, , . .    Neurologic:       Cardiovascular:     (+) hypertension----. : . . . :. .       METS/Exercise Tolerance:     Hematologic:  - neg hematologic  ROS       Musculoskeletal:   (+) arthritis, , , -       GI/Hepatic:     (+) GERD Asymptomatic on medication, bowel prep,       Renal/Genitourinary:  - ROS Renal section negative       Endo:  - neg endo ROS       Psychiatric:     (+) psychiatric history depression      Infectious Disease:  - neg infectious disease ROS       Malignancy:      - no malignancy   Other:    - neg other ROS                 Physical Exam  Normal systems: cardiovascular, pulmonary and dental    Airway   Mallampati: II  TM distance: >3 FB  Neck ROM: full    Dental     Cardiovascular   Rhythm and rate: regular and normal      Pulmonary                     Anesthesia Plan      History & Physical Review      ASA Status:  2 .    NPO Status:  > 8 hours    Plan for MAC with Intravenous and Propofol induction.          Postoperative Care      Consents  Anesthetic plan, risks, benefits and alternatives discussed with:  Patient..                          .

## 2018-04-03 ENCOUNTER — TELEPHONE (OUTPATIENT)
Dept: FAMILY MEDICINE | Facility: OTHER | Age: 58
End: 2018-04-03

## 2018-04-13 DIAGNOSIS — K21.9 GASTROESOPHAGEAL REFLUX DISEASE, ESOPHAGITIS PRESENCE NOT SPECIFIED: Primary | ICD-10-CM

## 2018-04-13 RX ORDER — NICOTINE POLACRILEX 4 MG/1
20 GUM, CHEWING ORAL DAILY
Qty: 90 TABLET | Refills: 2 | Status: SHIPPED | OUTPATIENT
Start: 2018-04-13 | End: 2019-01-23

## 2018-04-13 NOTE — TELEPHONE ENCOUNTER
Writer contacted patient back. Patient reports he requested a refill this A.M. from [id number] for his omeprazole. Is out of rx as requested. Writer advised patient he would care for rx request as noted. Encouraged patient to call refill requests in sooner in the future to avoid being out. Patient states understanding. Is happy with plan of care.     Kristopher Martino RN on 4/13/2018 at 4:18 PM

## 2018-04-13 NOTE — TELEPHONE ENCOUNTER
Chart review shows that patient was seen for a preop with PCP on 2/22/18. Rx as requested was noted in office visit notes on that date with no changes. Writer will refill rx as requested at this time as per RN refill protocol.    Prescription refilled per RN Medication Refill Policy..................Kristopher Martino 4/13/2018 4:21 PM

## 2018-05-10 DIAGNOSIS — F32.9 MAJOR DEPRESSIVE DISORDER WITH SINGLE EPISODE, REMISSION STATUS UNSPECIFIED: Primary | ICD-10-CM

## 2018-05-10 RX ORDER — DULOXETIN HYDROCHLORIDE 60 MG/1
60 CAPSULE, DELAYED RELEASE ORAL DAILY
Qty: 90 CAPSULE | Refills: 3 | Status: SHIPPED | OUTPATIENT
Start: 2018-05-10 | End: 2019-04-23

## 2018-05-10 NOTE — TELEPHONE ENCOUNTER
Writer is unable to fill rx as requested as patient has not had a PHQ-9 completed in the last 6 months. Last office visit with PCP was on 2/22/18 for a preop. Rx as requested from pharmacy was noted in office visit notes on that date without changes. Writer will joshua up and route rx request to PCP for his consideration/approval at this time. As pharmacy has requested refills 4 times in last 48 hours, writer will jose refill as urgent.    Unable to complete prescription refill per RN Medication Refill Policy. Kristopher Martino 5/10/2018 9:00 AM

## 2018-07-23 NOTE — PROGRESS NOTES
Patient Information     Patient Name  Augie De Jesus MRN  [id number] Sex  Male   1960      Letter by Jamaal Hernandez DO at      Author:  Jamaal Hernandez DO Service:  (none) Author Type:  (none)    Filed:   Encounter Date:  2017 Status:  (Other)           Augie De Jesus  [address]  [facility] [address]          2017    Dear Mr. De Jesus:      [place] KNEE SCORE    Please take the time to complete this survey following your total knee replacement.  This information helps keep track of our total joint patients  success following joint replacement.  It also is required for quality measures that the [facility] regulates for health care specialties. We included a self-addressed stamped envelope for your convenience.    The quality measures, or [place] Knee Scores, are done prior to knee replacement and at 3, 6, 9, and 12 months following knee replacement.  You will receive one of these surveys at each of those intervals. If you have any questions or have trouble completing the form and would like to talk to our department, you can reach us at [phone].    We appreciated you taking the time to complete this form for us.  The information provided helps us to track needed information from our patients.    Thank-you again,    [facility] Orthopedic Department

## 2018-07-24 NOTE — PROGRESS NOTES
Patient Information     Patient Name  Augie De Jesus MRN  [id number] Sex  Male   1960      Letter by Jamaal Hernandez DO at      Author:  Jamaal Hernandez DO Service:  (none) Author Type:  (none)    Filed:   Encounter Date:  2017 Status:  (Other)           Augie De Jesus  [address]  [address]  [facility] [address]          2017    Dear Mr. De Jesus:      OXFORD KNEE SCORE    Please take the time to complete this survey following your total knee replacement.  This information helps keep track of our total joint patients  success following joint replacement.  It also is required for quality measures that the [facility] regulates for health care specialties. We included a self-addressed stamped envelope for your convenience.    The quality measures, or [place] Knee Scores, are done prior to knee replacement and at 3, 6, 9, and 12 months following knee replacement.  You will receive one of these surveys at each of those intervals. If you have any questions or have trouble completing the form and would like to talk to our department, you can reach us at [phone].    We appreciated you taking the time to complete this form for us.  The information provided helps us to track needed information from our patients.    Thank-you again,    [facility] and [facility] Orthopedic Department

## 2019-01-23 DIAGNOSIS — K21.9 GASTROESOPHAGEAL REFLUX DISEASE, ESOPHAGITIS PRESENCE NOT SPECIFIED: ICD-10-CM

## 2019-01-23 RX ORDER — NICOTINE POLACRILEX 4 MG/1
20 GUM, CHEWING ORAL DAILY
Qty: 60 TABLET | Refills: 0 | Status: SHIPPED | OUTPATIENT
Start: 2019-01-23 | End: 2019-04-23

## 2019-01-23 NOTE — LETTER
January 23, 2019      Augie De Jesus  [address] [place] [address]  [address] [place] [id number]        Dear Augie,       A refill of omeprazole (RA OMEPRAZOLE) 20 MG tablet has been requested by your pharmacy.  We noticed that you will soon be due for a comprehensive visit with Dr. Ocasio as your last visit was on 2/22/2018.  A limited 60 day supply has been sent to your pharmacy at this time.    Additional refills require a medication management appointment.  Your health is very important to us.  Please call the clinic at [phone] to schedule your appointment.    Thank you,    The Refill Nurse  [facility]

## 2019-01-23 NOTE — TELEPHONE ENCOUNTER
"Refill request from  for:  omeprazole (RA OMEPRAZOLE) 20 MG tablet    Last filled 4/13/2018 for 9 month supply    LOV 2/22/2018 with PCP     No upcoming appt noted at this time    Pt soon due for med management appt.  Will refill limited supply, send letter, and add appt reminder note to pharm in rx    Requested Prescriptions   Pending Prescriptions Disp Refills     omeprazole (RA OMEPRAZOLE) 20 MG tablet 90 tablet 2     Sig: Take 1 tablet (20 mg) by mouth daily    PPI Protocol Passed - 1/23/2019 12:57 PM       Passed - Not on Clopidogrel (unless Pantoprazole ordered)       Passed - No diagnosis of osteoporosis on record       Passed - Recent (12 mo) or future (30 days) visit within the authorizing provider's specialty    Patient had office visit in the last 12 months or has a visit in the next 30 days with authorizing provider or within the authorizing provider's specialty.  See \"Patient Info\" tab in inbasket, or \"Choose Columns\" in Meds & Orders section of the refill encounter.           Passed - Medication is active on med list       Passed - Patient is age 18 or older          "

## 2019-01-28 DIAGNOSIS — K21.9 GASTROESOPHAGEAL REFLUX DISEASE, ESOPHAGITIS PRESENCE NOT SPECIFIED: ICD-10-CM

## 2019-01-29 NOTE — TELEPHONE ENCOUNTER
Redundant refill request refused: Too soon:    omeprazole (RA OMEPRAZOLE) 20 MG tablet 60 tablet 0 1/23/2019  No   Sig - Route: Take 1 tablet (20 mg) by mouth daily - Oral   Sent to pharmacy as: omeprazole (RA OMEPRAZOLE) 20 MG tablet   Class: E-Prescribe   Notes to Pharmacy: Pt due for medication management appt in February.  Please advise pt to contact [facility] for assistance in scheduling appt. Thank you.   Order: [id number]   E-Prescribing Status: Receipt confirmed by pharmacy (1/23/2019  1:00 PM CST)     [id number] - [place], [place] - [address]     Patient has no upcoming appointment. Refill request refused, with additional note to pharmacy.    Unable to complete prescription refill per RN Medication Refill Policy. Norma Dang RN .............. 1/29/2019  3:04 PM

## 2019-03-26 DIAGNOSIS — K21.9 GASTROESOPHAGEAL REFLUX DISEASE, ESOPHAGITIS PRESENCE NOT SPECIFIED: Primary | ICD-10-CM

## 2019-03-27 NOTE — TELEPHONE ENCOUNTER
[id number] sent Rx request for the following:      OMEPRAZOLE 20MG DR CAP  Sig: TAKE 1 CAPSULE (20MG) BY MOUTH DAILY  Last Prescription Date:   1/23/19  Last Fill Qty/Refills:         60, R-0    Last Office Visit:              2/22/18 (Pre-op Physical)  Future Office visit:           None    PPI Protocol Failed3/27 9:34 AM   Recent (12 mo) or future (30 days) visit within the authorizing provider's specialty     Pt was due for annual exam, around 2/22/19. Per refill encounter, dated 1/25, Pt was sent reminder letter, and given 2-month zara refill.    Called and spoke to Patient after verifying last name and date of birth. Pt notified of the above information and transferred to scheduling line, to set up annual exam, with Dr. Ocasio:  Next 5 appointments (look out 90 days)    Apr 23, 2019  1:00 PM CDT  PHYSICAL with Thiago [facility]io [facility]  [facility] [address] [place] [id number]  [phone]        Prescription approved per [facility] Refill Protocol for 30 days, with note to pharmacy. Norma Dang RN .............. 3/27/2019  9:45 AM

## 2019-04-23 ENCOUNTER — OFFICE VISIT (OUTPATIENT)
Dept: FAMILY MEDICINE | Facility: OTHER | Age: 59
End: 2019-04-23
Attending: FAMILY MEDICINE
Payer: COMMERCIAL

## 2019-04-23 VITALS
BODY MASS INDEX: 33.3 KG/M2 | SYSTOLIC BLOOD PRESSURE: 138 MMHG | DIASTOLIC BLOOD PRESSURE: 88 MMHG | HEIGHT: 66 IN | WEIGHT: 207.2 LBS | HEART RATE: 84 BPM | RESPIRATION RATE: 16 BRPM

## 2019-04-23 DIAGNOSIS — Z11.59 NEED FOR HEPATITIS C SCREENING TEST: ICD-10-CM

## 2019-04-23 DIAGNOSIS — Z00.00 ROUTINE HISTORY AND PHYSICAL EXAMINATION OF ADULT: Primary | ICD-10-CM

## 2019-04-23 DIAGNOSIS — G47.33 OSA (OBSTRUCTIVE SLEEP APNEA): ICD-10-CM

## 2019-04-23 DIAGNOSIS — Z13.220 SCREENING CHOLESTEROL LEVEL: ICD-10-CM

## 2019-04-23 DIAGNOSIS — F32.9 MAJOR DEPRESSIVE DISORDER WITH SINGLE EPISODE, REMISSION STATUS UNSPECIFIED: ICD-10-CM

## 2019-04-23 DIAGNOSIS — F32.1 CURRENT MODERATE EPISODE OF MAJOR DEPRESSIVE DISORDER WITHOUT PRIOR EPISODE (H): ICD-10-CM

## 2019-04-23 DIAGNOSIS — Z12.5 SCREENING FOR PROSTATE CANCER: ICD-10-CM

## 2019-04-23 DIAGNOSIS — K21.9 GASTROESOPHAGEAL REFLUX DISEASE, ESOPHAGITIS PRESENCE NOT SPECIFIED: ICD-10-CM

## 2019-04-23 PROCEDURE — G0463 HOSPITAL OUTPT CLINIC VISIT: HCPCS

## 2019-04-23 PROCEDURE — 99396 PREV VISIT EST AGE 40-64: CPT | Performed by: FAMILY MEDICINE

## 2019-04-23 RX ORDER — DULOXETIN HYDROCHLORIDE 60 MG/1
60 CAPSULE, DELAYED RELEASE ORAL DAILY
Qty: 90 CAPSULE | Refills: 3 | Status: SHIPPED | OUTPATIENT
Start: 2019-04-23 | End: 2020-04-17

## 2019-04-23 RX ORDER — BUPROPION HYDROCHLORIDE 150 MG/1
150 TABLET ORAL EVERY MORNING
Qty: 30 TABLET | Refills: 1 | Status: SHIPPED | OUTPATIENT
Start: 2019-04-23 | End: 2019-05-21

## 2019-04-23 ASSESSMENT — ANXIETY QUESTIONNAIRES
IF YOU CHECKED OFF ANY PROBLEMS ON THIS QUESTIONNAIRE, HOW DIFFICULT HAVE THESE PROBLEMS MADE IT FOR YOU TO DO YOUR WORK, TAKE CARE OF THINGS AT HOME, OR GET ALONG WITH OTHER PEOPLE: NOT DIFFICULT AT ALL
GAD7 TOTAL SCORE: 0
7. FEELING AFRAID AS IF SOMETHING AWFUL MIGHT HAPPEN: NOT AT ALL
1. FEELING NERVOUS, ANXIOUS, OR ON EDGE: NOT AT ALL
3. WORRYING TOO MUCH ABOUT DIFFERENT THINGS: NOT AT ALL
2. NOT BEING ABLE TO STOP OR CONTROL WORRYING: NOT AT ALL
5. BEING SO RESTLESS THAT IT IS HARD TO SIT STILL: NOT AT ALL
6. BECOMING EASILY ANNOYED OR IRRITABLE: NOT AT ALL

## 2019-04-23 ASSESSMENT — PATIENT HEALTH QUESTIONNAIRE - PHQ9
5. POOR APPETITE OR OVEREATING: NOT AT ALL
SUM OF ALL RESPONSES TO PHQ QUESTIONS 1-9: 9

## 2019-04-23 ASSESSMENT — PAIN SCALES - GENERAL: PAINLEVEL: MODERATE PAIN (5)

## 2019-04-23 ASSESSMENT — MIFFLIN-ST. JEOR: SCORE: 1712.6

## 2019-04-23 NOTE — PROGRESS NOTES
SUBJECTIVE:  Augie De Jesus is a 58 year old male here for annual exam.  He has a history of acid reflux continues on Prilosec daily, this is been working well.    He has a history of sleep apnea and continues on CPAP nightly although he admits that he forgets occasionally.    He has a history of chronic depression and has been on Cymbalta for quite some time.  This continues to work well however his dad passed away over the winter and he reports he has been struggling recently.  He has been drinking alcohol at times which has been a problem for him.  He is interested in talking about other options for treatment.      Patient Active Problem List    Diagnosis Date Noted     Alcohol dependence in remission (H) 02/22/2018     Priority: Medium     CMC arthritis 10/24/2016     Priority: Medium     Primary osteoarthritis of both first carpometacarpal joints 10/20/2016     Priority: Medium     YARELIS (obstructive sleep apnea) 06/23/2015     Priority: Medium     S/P total knee arthroplasty 06/23/2015     Priority: Medium     External hemorrhoids 05/04/2011     Priority: Medium     Gastroesophageal reflux disease 10/15/2010     Priority: Medium     Hypertension 05/12/2010     Priority: Medium     Depression, major 03/24/2010     Priority: Medium       Past Medical History:   Diagnosis Date     Alcohol-induced acute pancreatitis without infection or necrosis     07/2009,Pancreatitis July 2009, likely related to alcohol     Chondromalacia of knee     4/20/2015     Essential (primary) hypertension     5/12/2010     Gastro-esophageal reflux disease without esophagitis     10/15/2010     Major depressive disorder, single episode     3/24/2010     Obstructive sleep apnea     uses CPAP     Other specified cardiac arrhythmias (CODE)     5/4/2011     Other tear of medial meniscus, current injury, unspecified knee, initial encounter     4/20/2015     Personal history of other medical treatment (CODE)     3/22/2012     Primary  "osteoarthritis of both first carpometacarpal joints     10/20/2016     Primary osteoarthritis of hand     10/24/2016     Primary osteoarthritis of one knee     4/28/2010     Residual hemorrhoidal skin tags     5/4/2011     Sciatica     3/24/2010     Uncomplicated alcohol dependence (H)     2012,treatment fall 2012.       Past Surgical History:   Procedure Laterality Date     ARTHROPLASTY ANKLE Right 2017     ARTHROPLASTY KNEE Left     6/25/15,Knee Replacement, Total Dr. Hernandez     COLONOSCOPY  03/16/2018    Normal follow up 2028     COLONOSCOPY N/A 3/16/2018    Procedure: COLONOSCOPY;  Colonoscopy;  Surgeon: Jimmy Phelps MD;  Location: [facility] OR       Current Outpatient Medications   Medication Sig Dispense Refill     buPROPion (WELLBUTRIN XL) 150 MG 24 hr tablet Take 1 tablet (150 mg) by mouth every morning 30 tablet 1     DULoxetine (CYMBALTA) 60 MG capsule Take 1 capsule (60 mg) by mouth daily 90 capsule 3     omeprazole (PRILOSEC) 20 MG DR capsule Take 1 capsule (20 mg) by mouth daily 90 capsule 3       Allergies:  No Known Allergies    Family History   Problem Relation Age of Onset     Heart Disease Mother         Heart Disease, heart failure     Substance Abuse Father         Alcohol/Drug     Cancer Sister         Cancer,uterine cancer       Social History     Tobacco Use     Smoking status: Never Smoker     Smokeless tobacco: Never Used   Substance Use Topics     Alcohol use: Yes     Alcohol/week: 0.0 oz     Drug use: Unknown     Types: Other     Comment: Drug use: No       ROS:    As above otherwise ROS is unremarkable.      OBJECTIVE:  /88   Pulse 84   Resp 16   Ht 1.676 m (5' 6\")   Wt 94 kg (207 lb 3.2 oz)   BMI 33.44 kg/m      EXAM:  General Appearance: Pleasant, alert, appropriate appearance for age. No acute distress  Head: Normal. Normocephalic, atraumatic.  Eyes: PERRL, EOMI  Ears: Normal TM's bilaterally. Normal auditory canals and external ears.   OroPharynx: Dental hygiene adequate. " Normal buccal mucosa. Normal pharynx.  Neck: Supple, no masses or nodes, no lymphadenopathy.  No thyromegaly.  Lungs: Normal chest wall and respirations. Clear to auscultation, no wheezes or crackles.  Cardiovascular: Regular rate and rhythm. S1, S2, no murmurs.  Gastrointestinal: Soft, nontender, no abnormal masses or organomegaly. BS normal.  : Prostate exam shows no nodules, tenderness or asymmetry.  Musculoskeletal: No edema.  Skin: no concerning or new rashes.  Neurologic Exam: CN 2-12 grossly intact.  Normal gait.  Symmetric DTRs, No focal motor or sensory deficits. No tremor.  Psychiatric Exam: Alert and oriented, appropriate affect.  No psychomotor agitation or retardation.  No thoughts of harming self or others.  No hallucinations.    ASSESSEMENT AND PLAN:    1. Routine history and physical examination of adult    2. Screening cholesterol level    3. Screening for prostate cancer    4. Need for hepatitis C screening test    5. YARELIS (obstructive sleep apnea)    6. Current moderate episode of major depressive disorder without prior episode (H)    7. Major depressive disorder with single episode, remission status unspecified    8. Gastroesophageal reflux disease, esophagitis presence not specified      We will add Wellbutrin 150 mg daily to his regimen of Cymbalta.  Potential side effects were discussed.  He will follow-up in 1 month for reassessment.    He will return for fasting labs.    Continue CPAP.    Prilosec was filled for another year.    Colonoscopy was last year, next due in 2028.    He is up-to-date on immunizations.    Berto Ocsaio MD  Family Medicine      This document was prepared using voice generated software.  While every attempt was made for accuracy, grammatical errors may exist.

## 2019-04-23 NOTE — NURSING NOTE
Patient presents today for annual physical.  Medication Reconciliation Complete    Yudelka Jj LPN  4/23/2019 1:10 PM

## 2019-04-24 ENCOUNTER — TELEPHONE (OUTPATIENT)
Dept: FAMILY MEDICINE | Facility: OTHER | Age: 59
End: 2019-04-24

## 2019-04-24 ASSESSMENT — ANXIETY QUESTIONNAIRES: GAD7 TOTAL SCORE: 0

## 2019-04-24 NOTE — TELEPHONE ENCOUNTER
Spoke with patient who confirmed her last name and birth date, he was questioning his Wellbutrin prescription. Patient stated at his office visit he thought Thiago Ocasio MD told him to start taking it every other day for the first 2-3 weeks and then switch to everyday. Informed patient that order is written for daily, and that writer couldn't find in documentation from office visit anywhere stating take every other day at first. Patient is aware that Thiago Ocasio MD is out until Monday and stated he would take the medication as ordered and that writer would send message to provider for clarification.  Thank You  Polly Shah LPN 4/24/2019 1:35 PM

## 2019-04-24 NOTE — TELEPHONE ENCOUNTER
DWS- Pt called stating he was in yesterday and was put on a medication.   He was told to take 1 tablet every other day, he can not remember if he was to do this for the first week or the first two weeks.  He also needs this written out and faxed to [facility]; Annalisa. [phone] Because he resides at [facility], they are not able to allow him to take 1 tablet every other day because the directions on the bottle state for him to take 1 tab daily.   So he would like for someone to fax the full set of directions to the above fax number.     Christy Forbes on 4/24/2019 at 12:55 PM

## 2019-04-25 NOTE — TELEPHONE ENCOUNTER
Yes, as discussed in clinic, he should start every other day for the first 1-2 weeks, even though the prescription was written for daily.

## 2019-05-16 DIAGNOSIS — Z12.5 SCREENING FOR PROSTATE CANCER: ICD-10-CM

## 2019-05-16 DIAGNOSIS — Z11.59 NEED FOR HEPATITIS C SCREENING TEST: ICD-10-CM

## 2019-05-16 DIAGNOSIS — I10 ESSENTIAL HYPERTENSION: ICD-10-CM

## 2019-05-16 DIAGNOSIS — Z13.220 SCREENING CHOLESTEROL LEVEL: ICD-10-CM

## 2019-05-16 LAB
ALBUMIN SERPL-MCNC: 4.3 G/DL (ref 3.5–5.7)
ALP SERPL-CCNC: 60 U/L (ref 34–104)
ALT SERPL W P-5'-P-CCNC: 22 U/L (ref 7–52)
ANION GAP SERPL CALCULATED.3IONS-SCNC: 7 MMOL/L (ref 3–14)
AST SERPL W P-5'-P-CCNC: 22 U/L (ref 13–39)
BILIRUB SERPL-MCNC: 0.4 MG/DL (ref 0.3–1)
BUN SERPL-MCNC: 15 MG/DL (ref 7–25)
CALCIUM SERPL-MCNC: 9.1 MG/DL (ref 8.6–10.3)
CHLORIDE SERPL-SCNC: 98 MMOL/L (ref 98–107)
CHOLEST SERPL-MCNC: 210 MG/DL
CO2 SERPL-SCNC: 27 MMOL/L (ref 21–31)
CREAT SERPL-MCNC: 1.1 MG/DL (ref 0.7–1.3)
GFR SERPL CREATININE-BSD FRML MDRD: 69 ML/MIN/{1.73_M2}
GLUCOSE SERPL-MCNC: 114 MG/DL (ref 70–105)
HDLC SERPL-MCNC: 57 MG/DL (ref 23–92)
LDLC SERPL CALC-MCNC: 133 MG/DL
NONHDLC SERPL-MCNC: 153 MG/DL
POTASSIUM SERPL-SCNC: 4.1 MMOL/L (ref 3.5–5.1)
PROT SERPL-MCNC: 7.9 G/DL (ref 6.4–8.9)
PSA SERPL-ACNC: 1.02 NG/ML
SODIUM SERPL-SCNC: 132 MMOL/L (ref 134–144)
TRIGL SERPL-MCNC: 100 MG/DL

## 2019-05-16 PROCEDURE — 80061 LIPID PANEL: CPT | Performed by: FAMILY MEDICINE

## 2019-05-16 PROCEDURE — 80053 COMPREHEN METABOLIC PANEL: CPT | Performed by: FAMILY MEDICINE

## 2019-05-16 PROCEDURE — 86803 HEPATITIS C AB TEST: CPT | Performed by: FAMILY MEDICINE

## 2019-05-16 PROCEDURE — 36415 COLL VENOUS BLD VENIPUNCTURE: CPT | Performed by: FAMILY MEDICINE

## 2019-05-16 PROCEDURE — G0103 PSA SCREENING: HCPCS | Performed by: FAMILY MEDICINE

## 2019-05-16 NOTE — PROGRESS NOTES
Patient presents to [facility] clinic today for fasting lab only. The last visit he had, he was not fasting. Per Dr Ocasoi:  ASSESSEMENT AND PLAN:     1. Routine history and physical examination of adult    2. Screening cholesterol level    3. Screening for prostate cancer    4. Need for hepatitis C screening test    5. YARELIS (obstructive sleep apnea)    6. Current moderate episode of major depressive disorder without prior episode (H)    7. Major depressive disorder with single episode, remission status unspecified    8. Gastroesophageal reflux disease, esophagitis presence not specified        Cholesterol should be done, but the order was old. I extended it and [place] for all of the labs today.  Myriam Hollins CMA([facility])..................5/16/2019   9:34 AM               Myriam Hollins CMA([facility])..................5/16/2019   9:33 AM

## 2019-05-17 ENCOUNTER — THERAPY VISIT (OUTPATIENT)
Dept: CHIROPRACTIC MEDICINE | Facility: OTHER | Age: 59
End: 2019-05-17
Attending: CHIROPRACTOR
Payer: COMMERCIAL

## 2019-05-17 DIAGNOSIS — M62.838 MUSCLE SPASM: ICD-10-CM

## 2019-05-17 DIAGNOSIS — M99.01 SEGMENTAL AND SOMATIC DYSFUNCTION OF CERVICAL REGION: ICD-10-CM

## 2019-05-17 DIAGNOSIS — M47.812 SPONDYLOSIS OF CERVICAL REGION WITHOUT MYELOPATHY OR RADICULOPATHY: Primary | ICD-10-CM

## 2019-05-17 DIAGNOSIS — M99.02 SEGMENTAL AND SOMATIC DYSFUNCTION OF THORACIC REGION: ICD-10-CM

## 2019-05-17 LAB — HCV AB SERPL QL IA: NONREACTIVE

## 2019-05-17 PROCEDURE — 99203 OFFICE O/P NEW LOW 30 MIN: CPT | Performed by: CHIROPRACTOR

## 2019-05-17 PROCEDURE — G0463 HOSPITAL OUTPT CLINIC VISIT: HCPCS

## 2019-05-17 NOTE — PATIENT INSTRUCTIONS
You will be called to schedule neck xrays and f/up visit.  Use ice/heat, gentle stretching for comfort.

## 2019-05-17 NOTE — PROGRESS NOTES
PATIENT:  Augie De Jesus is a 58 year old  male presenting for neck pain  PROBLEM:   Date of Initial Visit for this Episode:  5/17/2019 5/17/2019  Visit #1    SUBJECTIVE / HPI:   Description and onset: Neck pain,greater on the right after a fall on the ice in December 2018.  He struck the lenny of his head into a door and received 10 stitches. Extends to top of neck bilaterally.   Duration and Frequency of Pain: constant aching  Radiation of pain: not into arms  Pain rated at it's worst: 6/10  Pain rated currently:  5/10  Pain course: Gradually getting worse  Worse with:  Pulls Turning neck more turning left, sitting up from lying down causes sharp focal pain.  Improved by:  Nothing.    Additional Features: weakness  Other Health Care Providers seen for this: PCP recommended  Previous treatment: Tried Ice, Heat, Tylenol and Ibuprofen.  Previous injury: Moderate degenerative multi-level changes      See flowsheets in chart for details.  Neck index 30%    Functional limitations:  Sleep disturbed 3-5 hours    Exercise habits: bikes at [place], transportation is bike, walks dog  Sleeping habits: restless sleeper in general, tosses frequently, inconsistent with using sleep apnea    Past D.C. Care: never       Health History as reported by the patient: Good,       PAST MEDICAL HISTORY:  Past Medical History:   Diagnosis Date     Alcohol-induced acute pancreatitis without infection or necrosis     07/2009,Pancreatitis July 2009, likely related to alcohol     Chondromalacia of knee     4/20/2015     Essential (primary) hypertension     5/12/2010     Gastro-esophageal reflux disease without esophagitis     10/15/2010     Major depressive disorder, single episode     3/24/2010     Obstructive sleep apnea     uses CPAP     Other specified cardiac arrhythmias (CODE)     5/4/2011     Other tear of medial meniscus, current injury, unspecified knee, initial encounter     4/20/2015     Personal history of other medical treatment  (CODE)     3/22/2012     Primary osteoarthritis of both first carpometacarpal joints     10/20/2016     Primary osteoarthritis of hand     10/24/2016     Primary osteoarthritis of one knee     4/28/2010     Residual hemorrhoidal skin tags     5/4/2011     Sciatica     3/24/2010     Uncomplicated alcohol dependence (H)     2012,treatment fall 2012.       PAST SURGICAL HISTORY:  Past Surgical History:   Procedure Laterality Date     ARTHROPLASTY ANKLE Right 2017     ARTHROPLASTY KNEE Left     6/25/15,Knee Replacement, Total Dr. Hernandez     COLONOSCOPY  03/16/2018    Normal follow up 2028     COLONOSCOPY N/A 3/16/2018    Procedure: COLONOSCOPY;  Colonoscopy;  Surgeon: Jimmy Phelps MD;  Location: [facility] OR       ALLERGIES:  No Known Allergies    CURRENT MEDICATIONS:  Current Outpatient Medications   Medication Sig Dispense Refill     buPROPion (WELLBUTRIN XL) 150 MG 24 hr tablet Take 1 tablet (150 mg) by mouth every morning 30 tablet 1     DULoxetine (CYMBALTA) 60 MG capsule Take 1 capsule (60 mg) by mouth daily 90 capsule 3     omeprazole (PRILOSEC) 20 MG DR capsule Take 1 capsule (20 mg) by mouth daily 90 capsule 3       SOCIAL HISTORY:  Marital Status: single (never ).  Children: no.  Occupation: not employed  Alcohol use:Occassional.  Tobacco use: Smoker: no.  Are you or have you used illicit drugs:  no.    FAMILY HISTORY:  Family History   Problem Relation Age of Onset     Heart Disease Mother         Heart Disease, heart failure     Substance Abuse Father         Alcohol/Drug     Cancer Sister         Cancer,uterine cancer       Patient Active Problem List   Diagnosis     CMC arthritis     Depression, major     External hemorrhoids     Gastroesophageal reflux disease     Hypertension     YARELIS (obstructive sleep apnea)     Primary osteoarthritis of both first carpometacarpal joints     S/P total knee arthroplasty     Alcohol dependence in remission (H)         ROS:  The patient denies any fevers, chills,  nausea, vomiting, diarrhea, constipation,dysuria, hematuria, or urinary hesitancy or incontinence.  No shortness of breath, chest pain, or rashes.    OBJECTIVE:    DIAGNOSTICS:  Reviewed most recent cervical CT spinal imaging taken.     3/22/17 EXAM:    CT Cervical Spine Without Intravenous Contrast.     CLINICAL HISTORY:    56 years old, male; Blunt trauma; ETOH, fall, confused     TECHNIQUE:    Axial computed tomography images of the cervical spine without intravenous  contrast.  This CT exam was performed using one or more of the following dose  reduction techniques:  automated exposure control, adjustment of the mA and/or  kV according to patient size, and/or use of iterative reconstruction technique.    Coronal and sagittal reformatted images were created and reviewed.     COMPARISON:    No relevant prior studies available.     FINDINGS:    Vertebrae:  There is straightening of the normal cervical lordosis.  Mildly  decreased vertebral body heights in the mid cervical spine.  No evidence of  acute fracture.  Mild to moderate degenerative endplate irregularity and  sclerosis worst at C6-7.  Minimal anterolisthesis of C5 on C6.  Moderate to  severe bilateral C3-4, moderate bilateral C4-5, and moderate bilateral C6-7  neuroforaminal narrowing due to endplate and facet proliferation.    Discs/spinal canal/neural foramina:  Mildly decreased disc space heights  throughout the cervical spine.  Moderately decreased C6-7 disc space height.  Vacuum phenomenon is noted in the C3-4, C4-5, and C6-7 disc spaces.  Facet  joint degenerative changes, greater on the right.  Mild to moderate canal  stenosis at C4-5 and C6-7 due to disc osteophyte complex.    Soft tissues:  Prevertebral soft tissues are unremarkable.    Vasculature:  Mild atherosclerotic calcification.    Sinuses:  Opacification of the visualized left maxillary sinus and partial  opacification of the right maxillary sinus.    Mastoid air cells:  Mastoid air cells  are clear.    Oropharynx:  Torus palatinus.    Lung apices:  Visualized lung apices are normal.     IMPRESSION:    1.  No evidence of acute fracture or subluxation.    2.  Degenerative changes as described above.     PHYSICAL EXAM:     GENERAL APPEARANCE: healthy, alert and no distress   GAIT: bilaterally feet turn out  SKIN: no suspicious lesions or rashes  NEURO:  symmetric and equal UE DTRs, normal strength and light touch sensory  PSYCH:  mentation appears normal, affect normal/bright, anxious and patient notes feeling anxious    MUSCULOSKELETAL:   Posture: Low left iliac crest, Level shoulders, Level  occiput.      Cervical   See prior CT imaging  Head position: mild forward  AROM:   halting arc of motion with torso motion +R lower neck, L upper and midneck pain with all but extension   15/50 flexion    25/45 extension    33/45 RLF  25/45 LLF    55/85 RR         55/85 LR       -Maximal Foraminal Compression: focal ipsilateral neck pain bilateral  - Shoulder Depression: pulling ipsilateral neck pain bilateral  Distraction: feels better    Tenderness: Suboccipitals   Muscle spasm:  Suboccipital, R>L scalene, SCM, levator scap, trapezius  Motion restriction: C3 in right rotation, C6 with left rotation      Thoracic  Shoulder position: forward, rounded mild kyphosis  AROM:   Restricted, moves with neck   +Kemps: +T6-7  Tenderness: T1, T6-7  Muscle spasm:  Trapezius, rhomboids  Motion restriction: T1 with extension, T7 with extension      ASSESSMENT: Augie De Jesus is a 58 year old male with posterior bilateral neck pain likely related to an exacerbation of multilevel significant degenerative changes of joints, discs and muscles.  Updated imaging to Ruling out more significant pathology following trauma ordered.      1. Spondylosis of cervical region without myelopathy or radiculopathy    2. Segmental and somatic dysfunction of cervical region    3. Segmental and somatic dysfunction of thoracic region    4. Muscle  spasm        PLAN    History and Examination only.   Spinal Xrays ordered with traumatic onset.    Procedures:  Modalities:  None performed this visit    CMT:  none    Therapeutic procedures:  None  Gave patient Ice/heat and stretching instruction    Response to Treatment: notes manual evaluation felt good to tight neck muscles.    Prognosis: Guarded    5/17/2019 Plan of Care:  6-8 visits of Chiropractic Care including Spinal Adjustments and/or physiotherapy and active rehabilitation, to include exercises in the office and/or at home to meet care plan goals.     Frequency: 2xweek for up to 4 weeks. A reevaluation would be clinically appropriate in 6-8 visits, to determine progress and further course of care.    POC discussed and patient agreeable to plan of care.      5/17/2019 Goals:      Patient will report improved pain.      Patient will demonstrate an improved ability to complete Activities of Daily Living  as shown by a reported 10-30% reduced score on neck and/or back index.    Patient will demonstrate improved ROM.        INSTRUCTIONS   Gave patient Ice/heat and stretching instruction. Bedtime recommendations.  Patient Instructions   You will be called to schedule neck xrays and f/up visit.  Use ice/heat, gentle stretching for comfort.      Follow-up:    Patient Instructions   You will be called to schedule neck xrays and f/up visit.  Use ice/heat, gentle stretching for comfort.

## 2019-05-20 ENCOUNTER — HOSPITAL ENCOUNTER (OUTPATIENT)
Dept: GENERAL RADIOLOGY | Facility: OTHER | Age: 59
Discharge: HOME | End: 2019-05-20
Attending: CHIROPRACTOR | Admitting: CHIROPRACTOR
Payer: COMMERCIAL

## 2019-05-20 DIAGNOSIS — M62.838 MUSCLE SPASM: ICD-10-CM

## 2019-05-20 DIAGNOSIS — M99.01 SEGMENTAL AND SOMATIC DYSFUNCTION OF CERVICAL REGION: ICD-10-CM

## 2019-05-20 DIAGNOSIS — M47.812 SPONDYLOSIS OF CERVICAL REGION WITHOUT MYELOPATHY OR RADICULOPATHY: ICD-10-CM

## 2019-05-20 DIAGNOSIS — M99.02 SEGMENTAL AND SOMATIC DYSFUNCTION OF THORACIC REGION: ICD-10-CM

## 2019-05-20 PROCEDURE — 72052 X-RAY EXAM NECK SPINE 6/>VWS: CPT

## 2019-05-21 ENCOUNTER — OFFICE VISIT (OUTPATIENT)
Dept: FAMILY MEDICINE | Facility: OTHER | Age: 59
End: 2019-05-21
Attending: FAMILY MEDICINE
Payer: COMMERCIAL

## 2019-05-21 VITALS
HEART RATE: 92 BPM | SYSTOLIC BLOOD PRESSURE: 138 MMHG | DIASTOLIC BLOOD PRESSURE: 88 MMHG | WEIGHT: 207.8 LBS | TEMPERATURE: 98.3 F | HEIGHT: 66 IN | RESPIRATION RATE: 18 BRPM | BODY MASS INDEX: 33.4 KG/M2

## 2019-05-21 DIAGNOSIS — F10.21 ALCOHOL DEPENDENCE IN REMISSION (H): Primary | ICD-10-CM

## 2019-05-21 DIAGNOSIS — F32.9 MAJOR DEPRESSIVE DISORDER WITH SINGLE EPISODE, REMISSION STATUS UNSPECIFIED: ICD-10-CM

## 2019-05-21 PROCEDURE — G0463 HOSPITAL OUTPT CLINIC VISIT: HCPCS

## 2019-05-21 PROCEDURE — 99213 OFFICE O/P EST LOW 20 MIN: CPT | Performed by: FAMILY MEDICINE

## 2019-05-21 RX ORDER — BUPROPION HYDROCHLORIDE 150 MG/1
150 TABLET ORAL EVERY MORNING
Qty: 30 TABLET | Refills: 11 | Status: SHIPPED | OUTPATIENT
Start: 2019-05-21 | End: 2020-05-04

## 2019-05-21 ASSESSMENT — ANXIETY QUESTIONNAIRES
1. FEELING NERVOUS, ANXIOUS, OR ON EDGE: SEVERAL DAYS
2. NOT BEING ABLE TO STOP OR CONTROL WORRYING: NOT AT ALL
GAD7 TOTAL SCORE: 2
IF YOU CHECKED OFF ANY PROBLEMS ON THIS QUESTIONNAIRE, HOW DIFFICULT HAVE THESE PROBLEMS MADE IT FOR YOU TO DO YOUR WORK, TAKE CARE OF THINGS AT HOME, OR GET ALONG WITH OTHER PEOPLE: NOT DIFFICULT AT ALL
3. WORRYING TOO MUCH ABOUT DIFFERENT THINGS: NOT AT ALL
6. BECOMING EASILY ANNOYED OR IRRITABLE: NOT AT ALL
7. FEELING AFRAID AS IF SOMETHING AWFUL MIGHT HAPPEN: NOT AT ALL
5. BEING SO RESTLESS THAT IT IS HARD TO SIT STILL: SEVERAL DAYS

## 2019-05-21 ASSESSMENT — MIFFLIN-ST. JEOR: SCORE: 1715.32

## 2019-05-21 ASSESSMENT — PATIENT HEALTH QUESTIONNAIRE - PHQ9
SUM OF ALL RESPONSES TO PHQ QUESTIONS 1-9: 0
5. POOR APPETITE OR OVEREATING: NOT AT ALL

## 2019-05-21 ASSESSMENT — PAIN SCALES - GENERAL: PAINLEVEL: MODERATE PAIN (5)

## 2019-05-21 NOTE — NURSING NOTE
Patient presents today for follow up on buPROPion.  Medication Reconciliation Complete    Yudelka Jj LPN  5/21/2019 3:05 PM

## 2019-05-21 NOTE — PROGRESS NOTES
"SUBJECTIVE:  Augie De Jesus is a 58 year old male here for follow-up.  At his last visit we added bupropion 150 mg daily to Cymbalta.  He has been tolerating this well without any side effects.  He reports occasionally has some increased irritability but he would be able to tolerate this.    Allergies:  No Known Allergies    ROS:    As above otherwise ROS is unremarkable.    OBJECTIVE:  /88   Pulse 92   Temp 98.3  F (36.8  C)   Resp 18   Ht 1.676 m (5' 6\")   Wt 94.3 kg (207 lb 12.8 oz)   BMI 33.54 kg/m      EXAM:  General Appearance: Pleasant, alert, appropriate appearance for age. No acute distress  Psychiatric Exam: Alert and oriented, appropriate affect.  No psychomotor agitation or retardation.  No thoughts of harming himself or others.  No hallucinations.    PHQ-9 SCORE 2/22/2018 4/23/2019 5/21/2019   PHQ-9 Total Score 3 9 0     JENNIFER-7 SCORE 2/22/2018 4/23/2019 5/21/2019   Total Score 0 0 2       ASSESSEMENT AND PLAN:    1. Alcohol dependence in remission (H)    2. Major depressive disorder with single episode, remission status unspecified      At this time he will continue his current regimen.  1 year of medications was filled.  He will follow-up as needed.    Paperwork to the [facility] to help with housing was completed and will be scanned into his chart.    Berto Ocasio MD    This document was prepared using voice generated software.  While every attempt was made for accuracy, grammatical errors may exist.  "

## 2019-05-22 ASSESSMENT — ANXIETY QUESTIONNAIRES: GAD7 TOTAL SCORE: 2

## 2019-05-30 ENCOUNTER — THERAPY VISIT (OUTPATIENT)
Dept: CHIROPRACTIC MEDICINE | Facility: OTHER | Age: 59
End: 2019-05-30
Attending: CHIROPRACTOR
Payer: COMMERCIAL

## 2019-05-30 DIAGNOSIS — M47.812 SPONDYLOSIS OF CERVICAL REGION WITHOUT MYELOPATHY OR RADICULOPATHY: Primary | ICD-10-CM

## 2019-05-30 DIAGNOSIS — M99.02 SEGMENTAL AND SOMATIC DYSFUNCTION OF THORACIC REGION: ICD-10-CM

## 2019-05-30 DIAGNOSIS — M99.01 SEGMENTAL AND SOMATIC DYSFUNCTION OF CERVICAL REGION: ICD-10-CM

## 2019-05-30 DIAGNOSIS — M62.838 MUSCLE SPASM: ICD-10-CM

## 2019-05-30 PROCEDURE — 98940 CHIROPRACT MANJ 1-2 REGIONS: CPT | Performed by: CHIROPRACTOR

## 2019-05-30 PROCEDURE — G0463 HOSPITAL OUTPT CLINIC VISIT: HCPCS

## 2019-05-30 NOTE — PROGRESS NOTES
5/30/2019  Visit #: 2    Subjective:  Augie De Jesus is a 58 year old male who is seen in f/u up for: xray results review and treatment        Spondylosis of cervical region without myelopathy or radiculopathy  Segmental and somatic dysfunction of cervical region  Segmental and somatic dysfunction of thoracic region  Muscle spasm.     Since last visit on 5/17/2019,  Augie De Jesus reports:    Area of chief complaint:  Cervical :  Symptoms are graded at 5-6/10. The quality is described as achey.  Motion has remained about the same, no improvement.      Objective:   XR CERV SPINE COMP INCL OBL & FLEX/EXT    HISTORY: fall on ice 12/2018; Spondylosis of cervical region without  myelopathy or radiculopathy; Segmental and somatic dysfunction of  cervical region; Segmental and somatic dysfunction of thoracic region;  Muscle spasm    TECHNIQUE: 9 views of the cervical spine.    COMPARISON: CT cervical spine 3/22/2017.    FINDINGS:    Cervical vertebral body heights are maintained. The range of motion  with flexion and extension is normal. No abnormal alignment or  translation is seen. The atlantodens interval is normal in width  although 7 degenerated. Scattered degenerative changes are noted,  including trace degenerative retrolisthesis of C4 on C5 associated  with mild/moderate focal disc height loss. Focal uncovertebral  hypertrophy is associated with C6-7 moderate disc height loss.     Impression:     IMPRESSION:     Scattered degenerative changes without evidence of acute or healing  fracture or instability.      ETHEL MONSALVE MD     My impression of imaging is mild anterior translation of C6 on C7 vertebral body on lateral flexion view.      The following was observed:    P: palpatory tenderness Sub-occipital and C3, C6-7, T2-3, T6-7:  R>>L  A: static palpation demonstrates intersegmental asymmetry , cervical, thoracic  R: restricted motion , C3 , C6 , T3  and T6  C3 with right rotation, C6 with left  rotation, T3 and T6 with extension and right rotation  T: muscle spasm at level(s): Levator scapulae, Rhomboids, Sub-occipital, Traps and Right greater than left scalene, SCM:  Bilaterally    Segmental spinal dysfunction/restrictions found at:  C3 L, C6 R, T3 flexed, T6 flex/L      Assessment:    Diagnoses:      1. Spondylosis of cervical region without myelopathy or radiculopathy    2. Segmental and somatic dysfunction of cervical region    3. Segmental and somatic dysfunction of thoracic region    4. Muscle spasm        Patient's condition:  Patient had restrictions pre-manipulation and Symptoms are unchanged    Treatment effectiveness:  Post manipulation there is better intersegmental movement and Patient claims to feel looser post manipulation      Procedures:  CMT:  40075 Chiropractic manipulative treatment 1-2 regions performed   Cervical: Diversified, C3 , C6, Supine  Thoracic: Diversified, T3, T6, Prone    Modalities:  None performed this visit    Therapeutic procedures:  None    Response to Treatment  Reduction in symptoms as reported by patient    Prognosis: Good    Progress towards Goals: Patient is making progress towards the goal.     Recommendations:    Instructions:  Use ice and/or heat, stretching and bedtime routine to relax muscles to help sleep.    Follow-up:    Return to care in 2 to 4 days.

## 2019-06-06 ENCOUNTER — TELEPHONE (OUTPATIENT)
Dept: FAMILY MEDICINE | Facility: OTHER | Age: 59
End: 2019-06-06

## 2019-06-06 NOTE — TELEPHONE ENCOUNTER
Patient was placed on wellbutrin about a month ago and states he is having physical reactions to this. He states he wants to be taken off this medication but does not know how to wean off of it. Please call patient back in regards to this. Thank You!

## 2019-06-07 NOTE — TELEPHONE ENCOUNTER
Patient is wanting to get off buPROPion. Patient complains he has been having more irritability and been edgy.  He would like to continue on the DULoxetine. Patient was offered appointment on Monday for medication management, but declined it. If any medication changes are made, orders need to be faxed [place] [phone].

## 2019-06-10 ENCOUNTER — THERAPY VISIT (OUTPATIENT)
Dept: CHIROPRACTIC MEDICINE | Facility: OTHER | Age: 59
End: 2019-06-10
Attending: CHIROPRACTOR
Payer: COMMERCIAL

## 2019-06-10 DIAGNOSIS — M99.02 SEGMENTAL AND SOMATIC DYSFUNCTION OF THORACIC REGION: ICD-10-CM

## 2019-06-10 DIAGNOSIS — M47.812 SPONDYLOSIS OF CERVICAL REGION WITHOUT MYELOPATHY OR RADICULOPATHY: ICD-10-CM

## 2019-06-10 DIAGNOSIS — M99.01 SEGMENTAL AND SOMATIC DYSFUNCTION OF CERVICAL REGION: Primary | ICD-10-CM

## 2019-06-10 PROCEDURE — 98940 CHIROPRACT MANJ 1-2 REGIONS: CPT | Performed by: CHIROPRACTOR

## 2019-06-10 PROCEDURE — G0463 HOSPITAL OUTPT CLINIC VISIT: HCPCS

## 2019-06-10 NOTE — PROGRESS NOTES
6/10/2019   Visit #: 3    Subjective:  Augie De Jesus is a 58 year old male who is seen in f/u up for:        Segmental and somatic dysfunction of cervical region  Segmental and somatic dysfunction of thoracic region  Spondylosis of cervical region without myelopathy or radiculopathy.     Since last visit on 5/17/2019,  Augie De Jesus reports:    Area of chief complaint:  Cervical :  Symptoms are graded at 4-5/10. Located bilateral posterior cervical, rather than one side. The quality is described as stiff, achey. There is pinching with turning and noises.  Motion has increased. Patient feels that they are improving.    Objective:  The following was observed:    P: tenderness Sub-occipital, T-spine paraspinal and Traps Bilaterally  A: static palpation demonstrates intersegmental asymmetry , cervical, thoracic  R: motion palpation notes restricted motion, restricted motion   T: muscle spasm at level(s): Levator scapulae, Rhomboids, Sub-occipital and Traps R>>L and scalenes, SCMs.    Segmental spinal dysfunction/restrictions found at:C2 L, C6 R, T3 flexed, T6 flex/L         Assessment:    Diagnoses:      1. Segmental and somatic dysfunction of cervical region    2. Segmental and somatic dysfunction of thoracic region    3. Spondylosis of cervical region without myelopathy or radiculopathy        Patient's condition:  Patient had restrictions pre-manipulation and Patient symptoms are gradually improving    Treatment effectiveness:  Post manipulation there is better intersegmental movement and Patient claims to feel looser post manipulation      Procedures:  CMT:  41910 Chiropractic manipulative treatment 1-2 regions performed   Cervical: Diversified, C2, C5 , Supine  Thoracic: Diversified, T2, T6, Prone    Modalities:  None performed this visit    Therapeutic procedures:  None    Response to Treatment  Reduction in symptoms, less pinching with turning    Prognosis: Good    Progress towards Goals: Patient is making  progress towards the goal.     Recommendations:    Instructions:  continue ice/heat, stretching and bedtime routine to relax muscles and help sleep    Follow-up:    Return to care in 3-4 days

## 2019-06-10 NOTE — TELEPHONE ENCOUNTER
He can decrease bupropion to 1 tab every other day for a week, then 1 tab every 3rd day for a week then stop.

## 2019-06-13 ENCOUNTER — THERAPY VISIT (OUTPATIENT)
Dept: CHIROPRACTIC MEDICINE | Facility: OTHER | Age: 59
End: 2019-06-13
Attending: CHIROPRACTOR
Payer: COMMERCIAL

## 2019-06-13 DIAGNOSIS — M99.02 SEGMENTAL AND SOMATIC DYSFUNCTION OF THORACIC REGION: ICD-10-CM

## 2019-06-13 DIAGNOSIS — M62.838 MUSCLE SPASM: ICD-10-CM

## 2019-06-13 DIAGNOSIS — M47.812 SPONDYLOSIS OF CERVICAL REGION WITHOUT MYELOPATHY OR RADICULOPATHY: ICD-10-CM

## 2019-06-13 DIAGNOSIS — M99.01 SEGMENTAL AND SOMATIC DYSFUNCTION OF CERVICAL REGION: Primary | ICD-10-CM

## 2019-06-13 PROCEDURE — 98940 CHIROPRACT MANJ 1-2 REGIONS: CPT | Performed by: CHIROPRACTOR

## 2019-06-13 PROCEDURE — G0463 HOSPITAL OUTPT CLINIC VISIT: HCPCS

## 2019-06-13 NOTE — PROGRESS NOTES
6/13/2019   Visit #: 3    Subjective:  Augie De Jesus is a 58 year old male who is seen in f/u up for:        Segmental and somatic dysfunction of cervical region  Segmental and somatic dysfunction of thoracic region  Spondylosis of cervical region without myelopathy or radiculopathy  Muscle spasm.     Since last visit on 6/10/2019 ,  Augie De Jesus reports: Being attention to practicing good posture habits has been helpful.    Area of chief complaint:  Cervical :  Symptoms are graded at 2-3/10. Located focal suboccipital and right mid cervical.  Patient feels that they are improving.    Objective:  The following was observed:    P: tenderness Sub-occipital, T-spine paraspinal, Traps and C3 paraspinals:  Right> left  A: static palpation demonstrates intersegmental asymmetry , cervical, thoracic  R: motion palpation notes restricted motion  T: muscle spasm at level(s): Levator scapulae, Rhomboids, Sub-occipital and Traps R>L and scalenes, SCMs.  Improving    Segmental spinal dysfunction/restrictions found at:C 1R, C 3L, T2 R, T4 flex/L         Assessment:    Diagnoses:      1. Segmental and somatic dysfunction of cervical region    2. Segmental and somatic dysfunction of thoracic region    3. Spondylosis of cervical region without myelopathy or radiculopathy    4. Muscle spasm        Patient's condition:  Patient had restrictions pre-manipulation and Patient symptoms are gradually improving    Treatment effectiveness:  Post manipulation there is better intersegmental movement, reduced spasm and Patient claims to feel looser post manipulation      Procedures:  CMT:  36617 Chiropractic manipulative treatment 1-2 regions performed   Cervical: Diversified, C1 , C3 , Supine  Thoracic: Diversified, T2, T4, Prone    Modalities:  None performed this visit    Therapeutic procedures:  None    Response to Treatment  Reduction in symptoms    Prognosis: Good  Progress towards Goals: Patient is making progress towards the goal.   "Patient reports \"Best it has been since I fell \".     Recommendations:    Instructions:  Continue to practice good posture habits with head retraction as well as back extension with scapular retraction    Follow-up:    Return to care in 4 days        "

## 2019-06-13 NOTE — PATIENT INSTRUCTIONS
Instructions:  Continue to practice good posture habits with head retraction as well as back extension with scapular retraction    Follow-up:    Return to care in 4 days

## 2019-06-17 ENCOUNTER — THERAPY VISIT (OUTPATIENT)
Dept: CHIROPRACTIC MEDICINE | Facility: OTHER | Age: 59
End: 2019-06-17
Attending: CHIROPRACTOR
Payer: COMMERCIAL

## 2019-06-17 DIAGNOSIS — M62.838 MUSCLE SPASM: ICD-10-CM

## 2019-06-17 DIAGNOSIS — M99.01 SEGMENTAL AND SOMATIC DYSFUNCTION OF CERVICAL REGION: Primary | ICD-10-CM

## 2019-06-17 DIAGNOSIS — M99.02 SEGMENTAL AND SOMATIC DYSFUNCTION OF THORACIC REGION: ICD-10-CM

## 2019-06-17 DIAGNOSIS — M47.812 SPONDYLOSIS OF CERVICAL REGION WITHOUT MYELOPATHY OR RADICULOPATHY: ICD-10-CM

## 2019-06-17 PROCEDURE — 98940 CHIROPRACT MANJ 1-2 REGIONS: CPT | Performed by: CHIROPRACTOR

## 2019-06-17 PROCEDURE — G0463 HOSPITAL OUTPT CLINIC VISIT: HCPCS

## 2019-06-17 NOTE — PROGRESS NOTES
6/17/2019   Visit #: 5    Subjective:  Augie De Jesus is a 58 year old male who is seen in f/u up for:        Segmental and somatic dysfunction of cervical region  Segmental and somatic dysfunction of thoracic region  Spondylosis of cervical region without myelopathy or radiculopathy  Muscle spasm.     Since last visit on 6/13/2019 ,  Augie De Jesus reports: Pleased with progress, neck pain only with turning.   Sleep quality depends if he uses his sleep apnea machine.    Area of chief complaint:  Cervical :  Symptoms are graded at 0-2/10. Focal neck pain with turning to right located right mid cervical.  Patient feels that they are improving.    Objective:  The following was observed:    P: tenderness Traps and Right C3 paraspinal:  Right> left  A: static palpation demonstrates intersegmental asymmetry , cervical, thoracic  R: motion palpation notes restricted motion  T: muscle spasm at level(s): Levator scapulae and Traps R>L and scalenes, SCMs.  Improving    Segmental spinal dysfunction/restrictions found at: Occiput L, C3 LLF, T2 R, T4 flex/L         Assessment:    Diagnoses:      1. Segmental and somatic dysfunction of cervical region    2. Segmental and somatic dysfunction of thoracic region    3. Spondylosis of cervical region without myelopathy or radiculopathy    4. Muscle spasm        Patient's condition:  Patient had restrictions pre-manipulation and Patient symptoms and muscle spasm are gradually improving    Treatment effectiveness:  Post manipulation there is better intersegmental movement, improved range of motion, reduced spasm and Patient claims to feel looser post manipulation      Procedures:  CMT:  62752 Chiropractic manipulative treatment 1-2 regions performed   Cervical: Diversified, Occiput, C3 , Supine  Thoracic: Diversified, T2, T4, Prone    Modalities:  None performed this visit    Therapeutic procedures:  None    Response to Treatment  Reduction in symptoms    Prognosis: Good  Progress  "towards Goals: Patient is making progress towards the goal.  Patient reports \"very pleased \"    Recommendations:    Instructions:  Continue self-care instructions    Follow-up:    Return to care in 4 days      "

## 2019-06-20 ENCOUNTER — THERAPY VISIT (OUTPATIENT)
Dept: CHIROPRACTIC MEDICINE | Facility: OTHER | Age: 59
End: 2019-06-20
Attending: CHIROPRACTOR
Payer: COMMERCIAL

## 2019-06-20 DIAGNOSIS — M62.838 MUSCLE SPASM: ICD-10-CM

## 2019-06-20 DIAGNOSIS — M47.812 SPONDYLOSIS OF CERVICAL REGION WITHOUT MYELOPATHY OR RADICULOPATHY: ICD-10-CM

## 2019-06-20 DIAGNOSIS — M99.01 SEGMENTAL AND SOMATIC DYSFUNCTION OF CERVICAL REGION: Primary | ICD-10-CM

## 2019-06-20 PROCEDURE — 98940 CHIROPRACT MANJ 1-2 REGIONS: CPT | Performed by: CHIROPRACTOR

## 2019-06-20 PROCEDURE — G0463 HOSPITAL OUTPT CLINIC VISIT: HCPCS

## 2019-06-20 NOTE — PROGRESS NOTES
6/20/2019   Visit #: 6    Subjective:  Augie De Jesus is a 58 year old male who is seen in f/u up for:        Segmental and somatic dysfunction of cervical region  Spondylosis of cervical region without myelopathy or radiculopathy  Muscle spasm.     Since last visit on 6/17/2019 ,  Augie De Jesus reports: improving, now feels like muscular tension.      Area of chief complaint:  Cervical :  Symptoms are graded at 0/10 currently. Upper cervical tension and focal neck pain with turning to right located right mid sternocleidomastoid muscle.  Patient feels that they are improving.    Objective:  The following was observed:    P: tenderness Sub-occipital and Right C3 paraspinal and SCM:   A: static palpation demonstrates intersegmental asymmetry , cervical, CLEAR thoracic  R: motion palpation notes restricted motion  T: muscle spasm at level(s): Levator scapulae, scalenes, SCM on right.  Improving    Segmental spinal dysfunction/restrictions found at: C1 L, C3 LLF.    Assessment:    Diagnoses:      1. Segmental and somatic dysfunction of cervical region    2. Spondylosis of cervical region without myelopathy or radiculopathy    3. Muscle spasm        Patient's condition: improving    Treatment effectiveness: Sleeping better.      Procedures:  CMT:  88375 Chiropractic manipulative treatment 1-2 regions performed   Cervical: Diversified, C1 , C3 , Supine  Thoracic: clear    Modalities:None performed this visit    Therapeutic procedures:  71718 Therapeutic Exercises 5 minutes spent with patient one on one to develop strength and endurance, range of motion and flexibility.  Patient able to demonstrate:   Stretching SCM  Head retraction exercise 10 [id number]    Response to Treatment Reduction in symptoms    Prognosis: Good  Progress towards Goals: Patient is making progress towards the goal.      Recommendations:  Patient Instructions   Instructions:  Stretch neck and Head retraction exercise 10 [id number] daily as shown  today.    Follow-up:    Return to care in 4 days

## 2019-06-20 NOTE — PATIENT INSTRUCTIONS
Instructions:  Stretch neck and Head retraction exercise 10 [id number] daily as shown today.    Follow-up:    Return to care in 4 days

## 2019-06-24 ENCOUNTER — THERAPY VISIT (OUTPATIENT)
Dept: CHIROPRACTIC MEDICINE | Facility: OTHER | Age: 59
End: 2019-06-24
Attending: CHIROPRACTOR
Payer: COMMERCIAL

## 2019-06-24 DIAGNOSIS — M99.01 SEGMENTAL AND SOMATIC DYSFUNCTION OF CERVICAL REGION: Primary | ICD-10-CM

## 2019-06-24 DIAGNOSIS — M47.812 SPONDYLOSIS OF CERVICAL REGION WITHOUT MYELOPATHY OR RADICULOPATHY: ICD-10-CM

## 2019-06-24 PROCEDURE — 98940 CHIROPRACT MANJ 1-2 REGIONS: CPT | Performed by: CHIROPRACTOR

## 2019-06-24 NOTE — PROGRESS NOTES
6/24/2019   Visit #: 7    Subjective:  Augie De Jesus is a 58 year old male who is seen in f/u up for:        Segmental and somatic dysfunction of cervical region  Spondylosis of cervical region without myelopathy or radiculopathy.     Since last visit on 6/20/2019 ,  Augie De Jesus reports: improving, now feels like muscular tension.      Area of chief complaint:  Cervical :  Symptoms are graded at 0/10, only tightness. Neck stretching and retraction exercises feel beneficial.   Upper  Back muscles tight. Patient feels that they are improved    Neck Disability Index (  [employer]. 1991. All rights reserved.; used with permission) 6/24/2019   SECTION 1 - PAIN INTENSITY 0   SECTION 2 - PERSONAL CARE 0   SECTION 3 - LIFTING 0   SECTION 4 - READING 0   SECTION 5 - HEADACHES 1   SECTION 6 - CONCENTRATION 0   SECTION 7 - WORK 0   SECTION 8 - DRIVING 0   SECTION 9 - SLEEPING 0   SECTION 10 - RECREATION 0   Count 10   Sum 1   Raw Score: /50 1   Neck Disability Index Score: (%) 2      Objective:  The following was observed:    P: tenderness R SCM without palpable fibrosis:   A: static palpation demonstrates intersegmental asymmetry , cervical, CLEAR thoracic  R: motion palpation notes restricted motion C1 right rotation   AROM improved and symmetrical, though less than normal.  Crepitus without pain consistent with degenerative changes.  no pain with compressive or distractive maneuvers.  T: muscle spasm at level(s): mild Levator scapulae, scalenes, SCM on right. Hypertonic trapezius.    S:  Restricted left shoulder abduction mildly with forward head posture.  sitting up from lying down No sharp focal pain. -Rusts sign    Segmental spinal dysfunction/restrictions found at: C1 L    Assessment: Objective and subjective improvement. Discharged to home exercises.     Diagnoses:      1. Segmental and somatic dysfunction of cervical region    2. Spondylosis of cervical region without myelopathy or radiculopathy         Patient's condition:   Treatment effectiveness:ROM and sleep better.      Procedures:  CMT:  87391 Chiropractic manipulative treatment 1-2 regions performed   Cervical: Diversified, C1 , Supine  Thoracic: clear    Modalities:None performed this visit    Therapeutic procedures:  72965 Therapeutic Exercises 5 minutes spent with patient one on one to develop strength and endurance, range of motion and flexibility.  Patient able to demonstrate:   Stretching supine Floor angels, upright options as instructed.    Response to Treatment Reduction in symptoms    Prognosis: Good  Progress towards Goals: Patient is making progress towards the goal.      Recommendations:  Patient Instructions   Discharged to home exercises: add  Floor angels

## 2019-06-25 ENCOUNTER — MYC MEDICAL ADVICE (OUTPATIENT)
Dept: FAMILY MEDICINE | Facility: OTHER | Age: 59
End: 2019-06-25

## 2020-03-11 ENCOUNTER — HEALTH MAINTENANCE LETTER (OUTPATIENT)
Age: 60
End: 2020-03-11

## 2020-03-16 ENCOUNTER — TELEPHONE (OUTPATIENT)
Dept: FAMILY MEDICINE | Facility: OTHER | Age: 60
End: 2020-03-16

## 2020-03-16 NOTE — TELEPHONE ENCOUNTER
Patient was transferred to scheduling for an appointment.    Yudelka Jj LPN on 3/16/2020 at 11:41 AM

## 2020-03-16 NOTE — TELEPHONE ENCOUNTER
DWS-Patient called in and would like to be worked in if possible has had shortness of breath for 5 months and it is getting worse.  Would also like a flu shot. Please call and advise    Alize Gibbs on 3/16/2020 at 11:02 AM

## 2020-03-17 ENCOUNTER — OFFICE VISIT (OUTPATIENT)
Dept: FAMILY MEDICINE | Facility: OTHER | Age: 60
End: 2020-03-17
Attending: FAMILY MEDICINE
Payer: COMMERCIAL

## 2020-03-17 ENCOUNTER — HOSPITAL ENCOUNTER (OUTPATIENT)
Dept: GENERAL RADIOLOGY | Facility: OTHER | Age: 60
End: 2020-03-17
Attending: FAMILY MEDICINE
Payer: COMMERCIAL

## 2020-03-17 VITALS
DIASTOLIC BLOOD PRESSURE: 88 MMHG | HEART RATE: 96 BPM | OXYGEN SATURATION: 97 % | WEIGHT: 202.2 LBS | SYSTOLIC BLOOD PRESSURE: 130 MMHG | HEIGHT: 66 IN | TEMPERATURE: 97.7 F | BODY MASS INDEX: 32.5 KG/M2 | RESPIRATION RATE: 20 BRPM

## 2020-03-17 DIAGNOSIS — R06.02 SOB (SHORTNESS OF BREATH): ICD-10-CM

## 2020-03-17 DIAGNOSIS — D50.9 MICROCYTIC ANEMIA: ICD-10-CM

## 2020-03-17 DIAGNOSIS — Z23 NEED FOR INFLUENZA VACCINATION: ICD-10-CM

## 2020-03-17 DIAGNOSIS — F10.21 ALCOHOL DEPENDENCE IN REMISSION (H): Primary | ICD-10-CM

## 2020-03-17 LAB
ALBUMIN SERPL-MCNC: 4.4 G/DL (ref 3.5–5.7)
ALP SERPL-CCNC: 73 U/L (ref 34–104)
ALT SERPL W P-5'-P-CCNC: 48 U/L (ref 7–52)
ANION GAP SERPL CALCULATED.3IONS-SCNC: 11 MMOL/L (ref 3–14)
ANISOCYTOSIS BLD QL SMEAR: SLIGHT
AST SERPL W P-5'-P-CCNC: 53 U/L (ref 13–39)
BILIRUB SERPL-MCNC: 0.6 MG/DL (ref 0.3–1)
BUN SERPL-MCNC: 17 MG/DL (ref 7–25)
CALCIUM SERPL-MCNC: 9.3 MG/DL (ref 8.6–10.3)
CHLORIDE SERPL-SCNC: 94 MMOL/L (ref 98–107)
CO2 SERPL-SCNC: 29 MMOL/L (ref 21–31)
CREAT SERPL-MCNC: 0.99 MG/DL (ref 0.7–1.3)
DIFFERENTIAL METHOD BLD: ABNORMAL
EOSINOPHIL # BLD AUTO: 0.1 10E9/L (ref 0–0.7)
EOSINOPHIL NFR BLD AUTO: 1 %
ERYTHROCYTE [DISTWIDTH] IN BLOOD BY AUTOMATED COUNT: 21.7 % (ref 10–15)
GFR SERPL CREATININE-BSD FRML MDRD: 78 ML/MIN/{1.73_M2}
GLUCOSE SERPL-MCNC: 112 MG/DL (ref 70–105)
HCT VFR BLD AUTO: 34.3 % (ref 40–53)
HGB BLD-MCNC: 9.9 G/DL (ref 13.3–17.7)
HYPOCHROMIA BLD QL: PRESENT
LYMPHOCYTES # BLD AUTO: 1.2 10E9/L (ref 0.8–5.3)
LYMPHOCYTES NFR BLD AUTO: 17 %
MCH RBC QN AUTO: 20.2 PG (ref 26.5–33)
MCHC RBC AUTO-ENTMCNC: 28.9 G/DL (ref 31.5–36.5)
MCV RBC AUTO: 70 FL (ref 78–100)
MONOCYTES # BLD AUTO: 0.8 10E9/L (ref 0–1.3)
MONOCYTES NFR BLD AUTO: 11 %
NEUTROPHILS # BLD AUTO: 5.2 10E9/L (ref 1.6–8.3)
NEUTROPHILS NFR BLD AUTO: 71 %
PLATELET # BLD AUTO: 191 10E9/L (ref 150–450)
POIKILOCYTOSIS BLD QL SMEAR: SLIGHT
POTASSIUM SERPL-SCNC: 3.7 MMOL/L (ref 3.5–5.1)
PROT SERPL-MCNC: 7.9 G/DL (ref 6.4–8.9)
RBC # BLD AUTO: 4.89 10E12/L (ref 4.4–5.9)
SODIUM SERPL-SCNC: 134 MMOL/L (ref 134–144)
SPHEROCYTES BLD QL SMEAR: SLIGHT
STOMATOCYTES BLD QL SMEAR: SLIGHT
TARGETS BLD QL SMEAR: SLIGHT
WBC # BLD AUTO: 7.3 10E9/L (ref 4–11)

## 2020-03-17 PROCEDURE — 99214 OFFICE O/P EST MOD 30 MIN: CPT | Performed by: FAMILY MEDICINE

## 2020-03-17 PROCEDURE — 71046 X-RAY EXAM CHEST 2 VIEWS: CPT

## 2020-03-17 PROCEDURE — 80053 COMPREHEN METABOLIC PANEL: CPT | Mod: ZL | Performed by: FAMILY MEDICINE

## 2020-03-17 PROCEDURE — 36415 COLL VENOUS BLD VENIPUNCTURE: CPT | Mod: ZL | Performed by: FAMILY MEDICINE

## 2020-03-17 PROCEDURE — G0008 ADMIN INFLUENZA VIRUS VAC: HCPCS

## 2020-03-17 PROCEDURE — 90471 IMMUNIZATION ADMIN: CPT

## 2020-03-17 PROCEDURE — 90686 IIV4 VACC NO PRSV 0.5 ML IM: CPT

## 2020-03-17 PROCEDURE — 85025 COMPLETE CBC W/AUTO DIFF WBC: CPT | Mod: ZL | Performed by: FAMILY MEDICINE

## 2020-03-17 PROCEDURE — G0463 HOSPITAL OUTPT CLINIC VISIT: HCPCS

## 2020-03-17 PROCEDURE — G0463 HOSPITAL OUTPT CLINIC VISIT: HCPCS | Mod: 25

## 2020-03-17 RX ORDER — NALTREXONE HYDROCHLORIDE 50 MG/1
50 TABLET, FILM COATED ORAL DAILY
Qty: 30 TABLET | Refills: 5 | Status: SHIPPED | OUTPATIENT
Start: 2020-03-17 | End: 2020-10-06

## 2020-03-17 ASSESSMENT — PAIN SCALES - GENERAL: PAINLEVEL: NO PAIN (0)

## 2020-03-17 ASSESSMENT — MIFFLIN-ST. JEOR: SCORE: 1684.92

## 2020-03-17 NOTE — LETTER
March 19, 2020      Augie De Jesus  [address] [place] [address]  [address]  [facility] [address]        Dear ,    We are writing to inform you of your test results.    As you can see your lab tests were significantly abnormal.  This could be due to your alcohol usage but other possibilities include bleeding.  Your last colonoscopy was 2018 but I suspect you also could have an ulcer or varices.  I would recommend over-the-counter Prilosec.  Once we get your echocardiogram done we should consider endoscopy and taking a look at your esophagus and cancer.  I will call you when the report comes in.  Again I would encourage no alcohol.    Resulted Orders   Comprehensive Metabolic Panel   Result Value Ref Range    Sodium 134 134 - 144 mmol/L    Potassium 3.7 3.5 - 5.1 mmol/L    Chloride 94 (L) 98 - 107 mmol/L    Carbon Dioxide 29 21 - 31 mmol/L    Anion Gap 11 3 - 14 mmol/L    Glucose 112 (H) 70 - 105 mg/dL    Urea Nitrogen 17 7 - 25 mg/dL    Creatinine 0.99 0.70 - 1.30 mg/dL    GFR Estimate 78 >60 mL/min/[1.73_m2]    GFR Estimate If Black >90 >60 mL/min/[1.73_m2]    Calcium 9.3 8.6 - 10.3 mg/dL    Bilirubin Total 0.6 0.3 - 1.0 mg/dL    Albumin 4.4 3.5 - 5.7 g/dL    Protein Total 7.9 6.4 - 8.9 g/dL    Alkaline Phosphatase 73 34 - 104 U/L    ALT 48 7 - 52 U/L    AST 53 (H) 13 - 39 U/L   CBC and Differential   Result Value Ref Range    WBC 7.3 4.0 - 11.0 10e9/L    RBC Count 4.89 4.4 - 5.9 10e12/L    Hemoglobin 9.9 (L) 13.3 - 17.7 g/dL    Hematocrit 34.3 (L) 40.0 - 53.0 %    MCV 70 (L) 78 - 100 fl    MCH 20.2 (L) 26.5 - 33.0 pg    MCHC 28.9 (L) 31.5 - 36.5 g/dL    RDW 21.7 (H) 10.0 - 15.0 %    Platelet Count 191 150 - 450 10e9/L    % Neutrophils 71.0 %    % Lymphocytes 17.0 %    % Monocytes 11.0 %    % Eosinophils 1.0 %    Absolute Neutrophil 5.2 1.6 - 8.3 10e9/L    Absolute Lymphocytes 1.2 0.8 - 5.3 10e9/L    Absolute Monocytes 0.8 0.0 - 1.3 10e9/L    Absolute Eosinophils 0.1 0.0 - 0.7 10e9/L    Anisocytosis Slight      Poikilocytosis Slight     Spherocytes Slight     Target Cells Slight     Stomatocytes Slight     Hypochromasia Present     Diff Method Manual Differential        If you have any questions or concerns, please call the clinic at the number listed above.       Sincerely,        Srinivasan Keys MD

## 2020-03-17 NOTE — NURSING NOTE
Patient here for SOB for the past 5 months. He did not show SOB ambulating to exam room room at the end of nicole. He says drinking makes his SOB worse and physical activity. Medication Reconciliation: complete.    Lela Sheridan LPN  3/17/2020 11:14 AM

## 2020-03-18 NOTE — PROGRESS NOTES
SUBJECTIVE:   Augie De Jesus is a 59 year old male who presents to clinic today for the following health issues:  Shortness of breath  Patient arrives here because of increasing shortness of breath.  He states is been going on for 5 months.  Seems to get worse with drinking and physical activity.  He finds that he is having less activity.  When he is just simply sitting it does not bother him.  Patient reports a history of alcohol abuse.  Drinking up to 1 bottle of vodka 9 times a month.  He has a long history of of alcohol abuse.  A year ago patient states he could bike up to 16 miles without difficulty.  Now biking 1 mile he has to stop 4-5 times.  There is no chest pain associated with it.  He reports occasional dizziness.  There is no problems with leg swelling.  Patient reports he has cut down on his drinking after visiting the Internet and finding potential cardiomyopathy.  No history of cirrhosis.  Patient does have a history of hypertension.  And depression.        Patient Active Problem List    Diagnosis Date Noted     Alcohol dependence in remission (H) 02/22/2018     Priority: Medium     CMC arthritis 10/24/2016     Priority: Medium     Primary osteoarthritis of both first carpometacarpal joints 10/20/2016     Priority: Medium     YARELIS (obstructive sleep apnea) 06/23/2015     Priority: Medium     S/P total knee arthroplasty 06/23/2015     Priority: Medium     External hemorrhoids 05/04/2011     Priority: Medium     Gastroesophageal reflux disease 10/15/2010     Priority: Medium     Hypertension 05/12/2010     Priority: Medium     Depression, major 03/24/2010     Priority: Medium     Past Medical History:   Diagnosis Date     Alcohol-induced acute pancreatitis without infection or necrosis     07/2009,Pancreatitis July 2009, likely related to alcohol     Chondromalacia of knee     4/20/2015     Essential (primary) hypertension     5/12/2010     Gastro-esophageal reflux disease without esophagitis      "10/15/2010     Major depressive disorder, single episode     3/24/2010     Obstructive sleep apnea     uses CPAP     Other specified cardiac arrhythmias (CODE)     5/4/2011     Other tear of medial meniscus, current injury, unspecified knee, initial encounter     4/20/2015     Personal history of other medical treatment (CODE)     3/22/2012     Primary osteoarthritis of both first carpometacarpal joints     10/20/2016     Primary osteoarthritis of hand     10/24/2016     Primary osteoarthritis of one knee     4/28/2010     Residual hemorrhoidal skin tags     5/4/2011     Sciatica     3/24/2010     Uncomplicated alcohol dependence (H)     2012,treatment fall 2012.      Past Surgical History:   Procedure Laterality Date     ARTHROPLASTY ANKLE Right 2017     ARTHROPLASTY KNEE Left     6/25/15,Knee Replacement, Total Dr. Hernandez     COLONOSCOPY  03/16/2018    Normal follow up 2028     COLONOSCOPY N/A 3/16/2018    Procedure: COLONOSCOPY;  Colonoscopy;  Surgeon: Jimmy Phelps MD;  Location:  OR     No Known Allergies    Review of Systems     OBJECTIVE:     /88   Pulse 96   Temp 97.7  F (36.5  C)   Resp 20   Ht 1.676 m (5' 6\")   Wt 91.7 kg (202 lb 3.2 oz)   SpO2 97%   BMI 32.64 kg/m    Body mass index is 32.64 kg/m .  Physical Exam  Constitutional:       Appearance: Normal appearance.   HENT:      Right Ear: Tympanic membrane normal.      Left Ear: Tympanic membrane normal.   Eyes:      Pupils: Pupils are equal, round, and reactive to light.   Cardiovascular:      Rate and Rhythm: Normal rate and regular rhythm.      Comments: Heart sounds are distant  Pulmonary:      Effort: Pulmonary effort is normal.      Breath sounds: Normal breath sounds.   Musculoskeletal: Normal range of motion.   Skin:     General: Skin is warm.   Neurological:      Mental Status: He is alert.   Psychiatric:         Mood and Affect: Mood normal.         Diagnostic Test Results:  Results for orders placed or performed during the " hospital encounter of 03/17/20   XR Chest 2 Views     Status: None    Narrative    XR CHEST 2 VW    HISTORY: 59 years Male SOB (shortness of breath)    COMPARISON: None    TECHNIQUE: 2 views of the chest were obtained.    FINDINGS: Two views of the chest were obtained. Heart size and  pulmonary vascularity are within normal limits, lungs are clear on  both views. No consolidating air space opacities are present.          Impression    IMPRESSION: Clear chest.    CARIN DE JESUS MD   Results for orders placed or performed in visit on 03/17/20   Comprehensive Metabolic Panel     Status: Abnormal   Result Value Ref Range    Sodium 134 134 - 144 mmol/L    Potassium 3.7 3.5 - 5.1 mmol/L    Chloride 94 (L) 98 - 107 mmol/L    Carbon Dioxide 29 21 - 31 mmol/L    Anion Gap 11 3 - 14 mmol/L    Glucose 112 (H) 70 - 105 mg/dL    Urea Nitrogen 17 7 - 25 mg/dL    Creatinine 0.99 0.70 - 1.30 mg/dL    GFR Estimate 78 >60 mL/min/[1.73_m2]    GFR Estimate If Black >90 >60 mL/min/[1.73_m2]    Calcium 9.3 8.6 - 10.3 mg/dL    Bilirubin Total 0.6 0.3 - 1.0 mg/dL    Albumin 4.4 3.5 - 5.7 g/dL    Protein Total 7.9 6.4 - 8.9 g/dL    Alkaline Phosphatase 73 34 - 104 U/L    ALT 48 7 - 52 U/L    AST 53 (H) 13 - 39 U/L   CBC and Differential     Status: Abnormal   Result Value Ref Range    WBC 7.3 4.0 - 11.0 10e9/L    RBC Count 4.89 4.4 - 5.9 10e12/L    Hemoglobin 9.9 (L) 13.3 - 17.7 g/dL    Hematocrit 34.3 (L) 40.0 - 53.0 %    MCV 70 (L) 78 - 100 fl    MCH 20.2 (L) 26.5 - 33.0 pg    MCHC 28.9 (L) 31.5 - 36.5 g/dL    RDW 21.7 (H) 10.0 - 15.0 %    Platelet Count 191 150 - 450 10e9/L    % Neutrophils 71.0 %    % Lymphocytes 17.0 %    % Monocytes 11.0 %    % Eosinophils 1.0 %    Absolute Neutrophil 5.2 1.6 - 8.3 10e9/L    Absolute Lymphocytes 1.2 0.8 - 5.3 10e9/L    Absolute Monocytes 0.8 0.0 - 1.3 10e9/L    Absolute Eosinophils 0.1 0.0 - 0.7 10e9/L    Anisocytosis Slight     Poikilocytosis Slight     Spherocytes Slight     Target Cells Slight      Stomatocytes Slight     Hypochromasia Present     Diff Method Manual Differential        ASSESSMENT/PLAN:         1. Alcohol dependence in remission (H)  We discussed options of.  And patient is interested in trying naltrexone.  - naltrexone (DEPADE/REVIA) 50 MG tablet; Take 1 tablet (50 mg) by mouth daily  Dispense: 30 tablet; Refill: 5  - Echocardiogram Complete; Future    2. SOB (shortness of breath)  Chest x-ray unremarkable.  Laboratory low hemoglobin and low MCV.  Patient did have a normal colonoscopy in 2018.  - XR Chest 2 Views; Future  - CBC and Differential; Future  - Comprehensive Metabolic Panel; Future  - Comprehensive Metabolic Panel  - CBC and Differential  - Echocardiogram Complete; Future    3. Need for influenza vaccination    - GH-IMM- FLU VAC PRESRV FREE QUAD SPLIT VIR > 6 MONTHS IM    With a microcytic anemia and a normal colonoscopy 2018.  Patient likely needs endoscopy.  Possibly has esophageal varices that have recently bled or are currently bleeding.  Will discuss with general surgery.  Also proceed with an echocardiogram.  To rule out cardiomyopathy.  With alcohol abuse one would expect this to be macrocytic anemia.  Patient may be bleeding from esophageal varices gastric ulcer.  Will wait for the echocardiogram.  I patient will need scoping endoscopy possible colonoscopy.  Will discuss with general surgery.  Srinivasan Keys MD  [facility]

## 2020-03-19 PROBLEM — D50.9 MICROCYTIC ANEMIA: Status: ACTIVE | Noted: 2020-03-19

## 2020-03-30 ENCOUNTER — MYC MEDICAL ADVICE (OUTPATIENT)
Dept: FAMILY MEDICINE | Facility: OTHER | Age: 60
End: 2020-03-30

## 2020-04-08 ENCOUNTER — TELEPHONE (OUTPATIENT)
Dept: FAMILY MEDICINE | Facility: OTHER | Age: 60
End: 2020-04-08

## 2020-04-13 ENCOUNTER — HOSPITAL ENCOUNTER (OUTPATIENT)
Dept: CARDIOLOGY | Facility: OTHER | Age: 60
Discharge: HOME | End: 2020-04-13
Attending: FAMILY MEDICINE | Admitting: FAMILY MEDICINE
Payer: COMMERCIAL

## 2020-04-13 DIAGNOSIS — R06.02 SOB (SHORTNESS OF BREATH): ICD-10-CM

## 2020-04-13 DIAGNOSIS — F10.21 ALCOHOL DEPENDENCE IN REMISSION (H): ICD-10-CM

## 2020-04-13 PROCEDURE — 93306 TTE W/DOPPLER COMPLETE: CPT

## 2020-04-13 PROCEDURE — 93306 TTE W/DOPPLER COMPLETE: CPT | Mod: 26 | Performed by: INTERNAL MEDICINE

## 2020-04-15 ENCOUNTER — TELEPHONE (OUTPATIENT)
Dept: FAMILY MEDICINE | Facility: OTHER | Age: 60
End: 2020-04-15

## 2020-04-15 DIAGNOSIS — K21.9 GASTROESOPHAGEAL REFLUX DISEASE, ESOPHAGITIS PRESENCE NOT SPECIFIED: ICD-10-CM

## 2020-04-15 DIAGNOSIS — R06.02 SOB (SHORTNESS OF BREATH): ICD-10-CM

## 2020-04-15 DIAGNOSIS — D50.9 MICROCYTIC ANEMIA: ICD-10-CM

## 2020-04-15 DIAGNOSIS — F10.21 ALCOHOL DEPENDENCE IN REMISSION (H): Primary | ICD-10-CM

## 2020-04-16 DIAGNOSIS — D50.9 MICROCYTIC ANEMIA: Primary | ICD-10-CM

## 2020-04-16 NOTE — PROGRESS NOTES
Called patient and transferred him to get a lab only appointment.  Lorraine Scanlon LPN .......4/16/2020 10:37 AM

## 2020-04-16 NOTE — Clinical Note
MAIRA-can you please set up a lab only appointment? Thanks!! Catherine Schaffer MD on 4/16/2020 at 10:31 AM

## 2020-04-17 DIAGNOSIS — F32.9 MAJOR DEPRESSIVE DISORDER WITH SINGLE EPISODE, REMISSION STATUS UNSPECIFIED: ICD-10-CM

## 2020-04-17 DIAGNOSIS — K21.9 GASTROESOPHAGEAL REFLUX DISEASE, ESOPHAGITIS PRESENCE NOT SPECIFIED: ICD-10-CM

## 2020-04-17 RX ORDER — DULOXETIN HYDROCHLORIDE 60 MG/1
60 CAPSULE, DELAYED RELEASE ORAL DAILY
Qty: 90 CAPSULE | Refills: 3 | Status: SHIPPED | OUTPATIENT
Start: 2020-04-17 | End: 2021-04-12

## 2020-04-17 NOTE — TELEPHONE ENCOUNTER
Routing refill request to provider for review/approval because:  Labs not current:  PHQ9    LOV: 3/17/2020    Ashely Chu RN on 4/17/2020 at 8:43 AM

## 2020-05-04 DIAGNOSIS — F32.9 MAJOR DEPRESSIVE DISORDER WITH SINGLE EPISODE, REMISSION STATUS UNSPECIFIED: ICD-10-CM

## 2020-05-04 RX ORDER — BUPROPION HYDROCHLORIDE 150 MG/1
150 TABLET ORAL EVERY MORNING
Qty: 30 TABLET | Refills: 11 | Status: SHIPPED | OUTPATIENT
Start: 2020-05-04 | End: 2021-04-09

## 2020-05-18 ENCOUNTER — TRANSFERRED RECORDS (OUTPATIENT)
Dept: HEALTH INFORMATION MANAGEMENT | Facility: OTHER | Age: 60
End: 2020-05-18

## 2020-10-05 DIAGNOSIS — F10.21 ALCOHOL DEPENDENCE IN REMISSION (H): ICD-10-CM

## 2020-10-06 RX ORDER — NALTREXONE HYDROCHLORIDE 50 MG/1
50 TABLET, FILM COATED ORAL DAILY
Qty: 30 TABLET | Refills: 5 | Status: SHIPPED | OUTPATIENT
Start: 2020-10-06 | End: 2021-04-27

## 2020-10-06 NOTE — TELEPHONE ENCOUNTER
sent Rx request for the following:   naltrexone (DEPADE/REVIA) 50 MG tablet   Sig:  TAKE 1 TABLET (50 MG) BY MOUTH DAILY    Last Prescription Date:   3/17/2020  Last Fill Qty/Refills:         30, R-5    Last Office Visit:              3/17/2020   Future Office visit:           10/7/2020  Routing refill request to provider for review/approval because:  Drug not on the [facility], [facility] or [facility] refill protocol or controlled substance    Roxana Knight RN  ....................  10/6/2020   9:33 AM

## 2020-12-07 ENCOUNTER — HOSPITAL ENCOUNTER (OUTPATIENT)
Dept: GENERAL RADIOLOGY | Facility: OTHER | Age: 60
End: 2020-12-07
Attending: FAMILY MEDICINE
Payer: COMMERCIAL

## 2020-12-07 ENCOUNTER — OFFICE VISIT (OUTPATIENT)
Dept: FAMILY MEDICINE | Facility: OTHER | Age: 60
End: 2020-12-07
Attending: FAMILY MEDICINE
Payer: COMMERCIAL

## 2020-12-07 VITALS
WEIGHT: 197 LBS | DIASTOLIC BLOOD PRESSURE: 88 MMHG | HEIGHT: 66 IN | BODY MASS INDEX: 31.66 KG/M2 | SYSTOLIC BLOOD PRESSURE: 138 MMHG | HEART RATE: 104 BPM | TEMPERATURE: 97.7 F | RESPIRATION RATE: 16 BRPM | OXYGEN SATURATION: 98 %

## 2020-12-07 DIAGNOSIS — M70.62 TROCHANTERIC BURSITIS OF BOTH HIPS: Primary | ICD-10-CM

## 2020-12-07 DIAGNOSIS — D50.9 MICROCYTIC ANEMIA: ICD-10-CM

## 2020-12-07 DIAGNOSIS — M70.61 TROCHANTERIC BURSITIS OF BOTH HIPS: Primary | ICD-10-CM

## 2020-12-07 DIAGNOSIS — M70.62 TROCHANTERIC BURSITIS OF BOTH HIPS: ICD-10-CM

## 2020-12-07 DIAGNOSIS — Z23 NEED FOR INFLUENZA VACCINATION: ICD-10-CM

## 2020-12-07 DIAGNOSIS — M70.61 TROCHANTERIC BURSITIS OF BOTH HIPS: ICD-10-CM

## 2020-12-07 DIAGNOSIS — F10.21 ALCOHOL DEPENDENCE IN REMISSION (H): ICD-10-CM

## 2020-12-07 LAB
BASOPHILS # BLD AUTO: 0 10E9/L (ref 0–0.2)
BASOPHILS NFR BLD AUTO: 0 %
DIFFERENTIAL METHOD BLD: ABNORMAL
EOSINOPHIL # BLD AUTO: 0 10E9/L (ref 0–0.7)
EOSINOPHIL NFR BLD AUTO: 0 %
ERYTHROCYTE [DISTWIDTH] IN BLOOD BY AUTOMATED COUNT: 19.9 % (ref 10–15)
FERRITIN SERPL-MCNC: 11 NG/ML (ref 23.9–336.2)
HCT VFR BLD AUTO: 34.2 % (ref 40–53)
HGB BLD-MCNC: 10.2 G/DL (ref 13.3–17.7)
LYMPHOCYTES # BLD AUTO: 1.8 10E9/L (ref 0.8–5.3)
LYMPHOCYTES NFR BLD AUTO: 24 %
MCH RBC QN AUTO: 19.6 PG (ref 26.5–33)
MCHC RBC AUTO-ENTMCNC: 29.8 G/DL (ref 31.5–36.5)
MCV RBC AUTO: 66 FL (ref 78–100)
MONOCYTES # BLD AUTO: 0.7 10E9/L (ref 0–1.3)
MONOCYTES NFR BLD AUTO: 9 %
NEUTROPHILS # BLD AUTO: 5.2 10E9/L (ref 1.6–8.3)
NEUTROPHILS NFR BLD AUTO: 67 %
PLATELET # BLD AUTO: 322 10E9/L (ref 150–450)
RBC # BLD AUTO: 5.2 10E12/L (ref 4.4–5.9)
WBC # BLD AUTO: 7.7 10E9/L (ref 4–11)

## 2020-12-07 PROCEDURE — 73523 X-RAY EXAM HIPS BI 5/> VIEWS: CPT

## 2020-12-07 PROCEDURE — G0463 HOSPITAL OUTPT CLINIC VISIT: HCPCS | Mod: 25

## 2020-12-07 PROCEDURE — 99214 OFFICE O/P EST MOD 30 MIN: CPT | Performed by: FAMILY MEDICINE

## 2020-12-07 PROCEDURE — 36415 COLL VENOUS BLD VENIPUNCTURE: CPT | Mod: ZL | Performed by: FAMILY MEDICINE

## 2020-12-07 PROCEDURE — 90471 IMMUNIZATION ADMIN: CPT

## 2020-12-07 PROCEDURE — G0463 HOSPITAL OUTPT CLINIC VISIT: HCPCS

## 2020-12-07 PROCEDURE — 85025 COMPLETE CBC W/AUTO DIFF WBC: CPT | Mod: ZL | Performed by: FAMILY MEDICINE

## 2020-12-07 PROCEDURE — 82728 ASSAY OF FERRITIN: CPT | Mod: ZL | Performed by: FAMILY MEDICINE

## 2020-12-07 RX ORDER — ACETAMINOPHEN 500 MG
1000 TABLET ORAL EVERY 8 HOURS PRN
Qty: 100 TABLET | Refills: 3 | Status: SHIPPED | OUTPATIENT
Start: 2020-12-07

## 2020-12-07 SDOH — HEALTH STABILITY: MENTAL HEALTH: HOW OFTEN DO YOU HAVE 6 OR MORE DRINKS ON ONE OCCASION?: NOT ASKED

## 2020-12-07 SDOH — HEALTH STABILITY: MENTAL HEALTH: HOW OFTEN DO YOU HAVE A DRINK CONTAINING ALCOHOL?: MONTHLY OR LESS

## 2020-12-07 SDOH — HEALTH STABILITY: MENTAL HEALTH: HOW MANY STANDARD DRINKS CONTAINING ALCOHOL DO YOU HAVE ON A TYPICAL DAY?: NOT ASKED

## 2020-12-07 ASSESSMENT — PAIN SCALES - GENERAL: PAINLEVEL: MODERATE PAIN (4)

## 2020-12-07 ASSESSMENT — MIFFLIN-ST. JEOR: SCORE: 1656.34

## 2020-12-07 ASSESSMENT — PATIENT HEALTH QUESTIONNAIRE - PHQ9: SUM OF ALL RESPONSES TO PHQ QUESTIONS 1-9: 8

## 2020-12-07 NOTE — NURSING NOTE
Patient presents today for bilateral hip pain that has been on going for the last year.     Medication Reconciliation Complete    Yudelka Jj LPN  12/7/2020 2:29 PM

## 2020-12-07 NOTE — PROGRESS NOTES
"SUBJECTIVE:  Augie De Jesus is a 60 year old male here for multiple concerns.  First is a history of alcoholism.  He reports that he is been cutting back significantly recently and is only been drinking once or twice a month.  He initially tried some naltrexone which \"did not work.\"  He was seen in March where he was found to have anemia with reduced MCV.  He has a history of normal colonoscopy in 2018.  He has not been using excess anti-inflammatories.  It was recommended to proceed with endoscopy and colonoscopy but he did not follow through with that.    For last few months he has had worsening bilateral lateral hip pain.  He does not recall any particular trauma that brought this on.  He has pain when he is sleeping or with walking.  He has not tried anything for this at home.      Patient Active Problem List    Diagnosis Date Noted     Microcytic anemia 03/19/2020     Priority: Medium     Alcohol dependence in remission (H) 02/22/2018     Priority: Medium     CMC arthritis 10/24/2016     Priority: Medium     Primary osteoarthritis of both first carpometacarpal joints 10/20/2016     Priority: Medium     YARELIS (obstructive sleep apnea) 06/23/2015     Priority: Medium     S/P total knee arthroplasty 06/23/2015     Priority: Medium     External hemorrhoids 05/04/2011     Priority: Medium     Gastroesophageal reflux disease 10/15/2010     Priority: Medium     Hypertension 05/12/2010     Priority: Medium     Depression, major 03/24/2010     Priority: Medium       Past Medical History:   Diagnosis Date     Alcohol-induced acute pancreatitis without infection or necrosis     07/2009,Pancreatitis July 2009, likely related to alcohol     Chondromalacia of knee     4/20/2015     Essential (primary) hypertension     5/12/2010     Gastro-esophageal reflux disease without esophagitis     10/15/2010     Major depressive disorder, single episode     3/24/2010     Obstructive sleep apnea     uses CPAP     Other specified cardiac " arrhythmias (CODE)     5/4/2011     Other tear of medial meniscus, current injury, unspecified knee, initial encounter     4/20/2015     Personal history of other medical treatment (CODE)     3/22/2012     Primary osteoarthritis of both first carpometacarpal joints     10/20/2016     Primary osteoarthritis of hand     10/24/2016     Primary osteoarthritis of one knee     4/28/2010     Residual hemorrhoidal skin tags     5/4/2011     Sciatica     3/24/2010     Uncomplicated alcohol dependence (H)     2012,treatment fall 2012.       Past Surgical History:   Procedure Laterality Date     ARTHROPLASTY ANKLE Right 2017     ARTHROPLASTY KNEE Left     6/25/15,Knee Replacement, Total Dr. Hernandez     COLONOSCOPY  03/16/2018    Normal follow up 2028     COLONOSCOPY N/A 3/16/2018    Procedure: COLONOSCOPY;  Colonoscopy;  Surgeon: Jimmy Phelps MD;  Location:  OR       Current Outpatient Medications   Medication Sig Dispense Refill     buPROPion (WELLBUTRIN XL) 150 MG 24 hr tablet TAKE 1 TABLET (150 MG) BY MOUTH EVERY MORNING 30 tablet 11     DULoxetine (CYMBALTA) 60 MG capsule TAKE 1 CAPSULE (60 MG) BY MOUTH DAILY 90 capsule 3     naltrexone (DEPADE/REVIA) 50 MG tablet TAKE 1 TABLET (50 MG) BY MOUTH DAILY 30 tablet 5     omeprazole (PRILOSEC) 20 MG DR capsule TAKE 1 CAPSULE (20 MG) BY MOUTH DAILY 90 capsule 3       Allergies:  No Known Allergies    Family History   Problem Relation Age of Onset     Heart Disease Mother         Heart Disease, heart failure     Substance Abuse Father         Alcohol/Drug     Cancer Sister         Cancer,uterine cancer       Social History     Tobacco Use     Smoking status: Never Smoker     Smokeless tobacco: Never Used   Substance Use Topics     Alcohol use: Yes     Frequency: Monthly or less     Drug use: Not Currently     Types: Other     Comment: Drug use: No       ROS:    As above otherwise ROS is unremarkable.      OBJECTIVE:  /88   Pulse 104   Temp 97.7  F (36.5  C)   Resp 16  "  Ht 1.676 m (5' 6\")   Wt 89.4 kg (197 lb)   SpO2 98%   BMI 31.80 kg/m      EXAM:  General Appearance: Pleasant, alert, appropriate appearance for age. No acute distress  Musculoskeletal: He has tenderness palpation bilaterally over his greater trochanter and just posterior bilaterally.  His left side seems to be worse than right.  He has no SI joint tenderness.  Flexion bilaterally to 110 degrees that reproduces pain.  Internal rotation 10 degrees next rotation 45 degrees bilaterally.  No pain with logrolling.  Skin: no concerning or new rashes.  Neurologic Exam: CN 2-12 grossly intact.  Normal gait.      ASSESSEMENT AND PLAN:    1. Trochanteric bursitis of both hips    2. Need for influenza vaccination    3. Microcytic anemia    4. Alcohol dependence in remission (H)      In regards to his microcytic anemia we repeated CBC and ferritin level today.  If this is consistent with iron deficiency anemia we could consider starting iron supplements but I would recommend repeating endoscopy.  He has cut back on his drinking significantly.    For his hip pain x-ray was performed, personally reviewed and shows no significant joint degeneration.  Suspect that his symptoms are due to trochanteric bursitis.  He will use ice and work with his chiropractor as he does have some mild pelvic tilt noted.  Given the concern about possible gastritis and bleeding we will have him hold off on anti-inflammatories at this time.  If his CBC, ferritin and or endoscopy are not consistent with gastritis we could consider adding anti-inflammatories.    Flu shot was updated today.    Berto Ocasio MD  Family Medicine      This document was prepared using voice generated software.  While every attempt was made for accuracy, grammatical errors may exist.  "

## 2020-12-15 ENCOUNTER — TELEPHONE (OUTPATIENT)
Dept: SURGERY | Facility: OTHER | Age: 60
End: 2020-12-15

## 2020-12-15 ENCOUNTER — MYC MEDICAL ADVICE (OUTPATIENT)
Dept: FAMILY MEDICINE | Facility: OTHER | Age: 60
End: 2020-12-15

## 2020-12-15 DIAGNOSIS — D50.9 MICROCYTIC ANEMIA: Primary | ICD-10-CM

## 2020-12-15 NOTE — TELEPHONE ENCOUNTER
Patient referred by Dr. Ocasio for a diagnostic colonoscopy and Upper GI endoscopy ,   Diagnosis is microcytic anemia.  Please advise.   Thank you.Rosalie Eaton on 12/15/2020 at 1:34 PM

## 2020-12-27 ENCOUNTER — HEALTH MAINTENANCE LETTER (OUTPATIENT)
Age: 60
End: 2020-12-27

## 2021-03-17 DIAGNOSIS — K21.9 GASTROESOPHAGEAL REFLUX DISEASE, UNSPECIFIED WHETHER ESOPHAGITIS PRESENT: Primary | ICD-10-CM

## 2021-03-18 RX ORDER — NICOTINE POLACRILEX 4 MG/1
GUM, CHEWING ORAL
Qty: 90 TABLET | Refills: 2 | Status: SHIPPED | OUTPATIENT
Start: 2021-03-18

## 2021-03-18 NOTE — TELEPHONE ENCOUNTER
[id number] sent Rx request for the following:    Patient enrolled in our Ready Refill service to improveadherence. We are requesting a refill authorization inadvance to ensure an active prescription is on file.     Requested Prescriptions   Pending Prescriptions Disp Refills   omeprazole 20 MG tablet [Pharmacy Med Name: OMEPRAZOLE 20MG DR TABLET] 240 tablet     Sig: TAKE 1 TABLET BY MOUTH EVERY DAY   Last Prescription Date:   4/17/20  Last Fill Qty/Refills:         90, R-3    Last Office Visit:              12/7/20  Future Office visit:           None    Prescription approved per [facility] Refill Protocol for 90 days and 2 additional refills at this time. Norma Dang RN .............. 3/18/2021  10:35 AM

## 2021-03-30 ENCOUNTER — HOSPITAL ENCOUNTER (EMERGENCY)
Facility: OTHER | Age: 61
Discharge: HOME | End: 2021-03-30
Attending: PHYSICIAN ASSISTANT | Admitting: PHYSICIAN ASSISTANT
Payer: COMMERCIAL

## 2021-03-30 ENCOUNTER — NURSE TRIAGE (OUTPATIENT)
Dept: FAMILY MEDICINE | Facility: OTHER | Age: 61
End: 2021-03-30

## 2021-03-30 ENCOUNTER — APPOINTMENT (OUTPATIENT)
Dept: CT IMAGING | Facility: OTHER | Age: 61
End: 2021-03-30
Attending: PHYSICIAN ASSISTANT
Payer: COMMERCIAL

## 2021-03-30 VITALS
DIASTOLIC BLOOD PRESSURE: 91 MMHG | BODY MASS INDEX: 31.66 KG/M2 | SYSTOLIC BLOOD PRESSURE: 161 MMHG | OXYGEN SATURATION: 97 % | HEART RATE: 91 BPM | HEIGHT: 66 IN | TEMPERATURE: 97.9 F | WEIGHT: 197 LBS

## 2021-03-30 DIAGNOSIS — R10.32 ABDOMINAL PAIN, LEFT LOWER QUADRANT: ICD-10-CM

## 2021-03-30 DIAGNOSIS — R91.8 GROUND GLASS OPACITY PRESENT ON IMAGING OF LUNG: ICD-10-CM

## 2021-03-30 DIAGNOSIS — D64.9 ANEMIA: ICD-10-CM

## 2021-03-30 LAB
ALBUMIN SERPL-MCNC: 4.3 G/DL (ref 3.5–5.7)
ALBUMIN UR-MCNC: 10 MG/DL
ALP SERPL-CCNC: 61 U/L (ref 34–104)
ALT SERPL W P-5'-P-CCNC: 23 U/L (ref 7–52)
ANION GAP SERPL CALCULATED.3IONS-SCNC: 8 MMOL/L (ref 3–14)
APPEARANCE UR: CLEAR
AST SERPL W P-5'-P-CCNC: 25 U/L (ref 13–39)
BACTERIA #/AREA URNS HPF: ABNORMAL /HPF
BASOPHILS # BLD AUTO: 0 10E9/L (ref 0–0.2)
BASOPHILS NFR BLD AUTO: 0.7 %
BILIRUB SERPL-MCNC: 0.7 MG/DL (ref 0.3–1)
BILIRUB UR QL STRIP: NEGATIVE
BUN SERPL-MCNC: 14 MG/DL (ref 7–25)
CALCIUM SERPL-MCNC: 9 MG/DL (ref 8.6–10.3)
CHLORIDE SERPL-SCNC: 94 MMOL/L (ref 98–107)
CO2 SERPL-SCNC: 30 MMOL/L (ref 21–31)
COLOR UR AUTO: YELLOW
CREAT SERPL-MCNC: 0.87 MG/DL (ref 0.7–1.3)
DIFFERENTIAL METHOD BLD: ABNORMAL
EOSINOPHIL # BLD AUTO: 0.2 10E9/L (ref 0–0.7)
EOSINOPHIL NFR BLD AUTO: 2.6 %
ERYTHROCYTE [DISTWIDTH] IN BLOOD BY AUTOMATED COUNT: 20.5 % (ref 10–15)
GFR SERPL CREATININE-BSD FRML MDRD: >90 ML/MIN/{1.73_M2}
GLUCOSE SERPL-MCNC: 105 MG/DL (ref 70–105)
GLUCOSE UR STRIP-MCNC: NEGATIVE MG/DL
HCT VFR BLD AUTO: 31.1 % (ref 40–53)
HGB BLD-MCNC: 9.1 G/DL (ref 13.3–17.7)
HGB UR QL STRIP: NEGATIVE
IMM GRANULOCYTES # BLD: 0 10E9/L (ref 0–0.4)
IMM GRANULOCYTES NFR BLD: 0.3 %
KETONES UR STRIP-MCNC: NEGATIVE MG/DL
LACTATE BLD-SCNC: 1.2 MMOL/L (ref 0.7–2)
LEUKOCYTE ESTERASE UR QL STRIP: NEGATIVE
LIPASE SERPL-CCNC: 12 U/L (ref 11–82)
LYMPHOCYTES # BLD AUTO: 0.8 10E9/L (ref 0.8–5.3)
LYMPHOCYTES NFR BLD AUTO: 13 %
MAGNESIUM SERPL-MCNC: 1.6 MG/DL (ref 1.9–2.7)
MCH RBC QN AUTO: 19.2 PG (ref 26.5–33)
MCHC RBC AUTO-ENTMCNC: 29.3 G/DL (ref 31.5–36.5)
MCV RBC AUTO: 66 FL (ref 78–100)
MONOCYTES # BLD AUTO: 0.7 10E9/L (ref 0–1.3)
MONOCYTES NFR BLD AUTO: 11.6 %
MUCOUS THREADS #/AREA URNS LPF: PRESENT /LPF
NEUTROPHILS # BLD AUTO: 4.2 10E9/L (ref 1.6–8.3)
NEUTROPHILS NFR BLD AUTO: 71.8 %
NITRATE UR QL: NEGATIVE
PH UR STRIP: 7.5 PH (ref 5–7)
PLATELET # BLD AUTO: 188 10E9/L (ref 150–450)
POTASSIUM SERPL-SCNC: 3.4 MMOL/L (ref 3.5–5.1)
PROT SERPL-MCNC: 7.9 G/DL (ref 6.4–8.9)
RBC # BLD AUTO: 4.75 10E12/L (ref 4.4–5.9)
RBC #/AREA URNS AUTO: <1 /HPF (ref 0–2)
SODIUM SERPL-SCNC: 132 MMOL/L (ref 134–144)
SOURCE: ABNORMAL
SP GR UR STRIP: 1.04 (ref 1–1.03)
UROBILINOGEN UR STRIP-MCNC: NORMAL MG/DL (ref 0–2)
WBC # BLD AUTO: 5.8 10E9/L (ref 4–11)
WBC #/AREA URNS AUTO: 1 /HPF (ref 0–5)

## 2021-03-30 PROCEDURE — 255N000002 HC RX 255 OP 636: Performed by: PHYSICIAN ASSISTANT

## 2021-03-30 PROCEDURE — 81001 URINALYSIS AUTO W/SCOPE: CPT | Performed by: PHYSICIAN ASSISTANT

## 2021-03-30 PROCEDURE — 250N000011 HC RX IP 250 OP 636: Performed by: PHYSICIAN ASSISTANT

## 2021-03-30 PROCEDURE — 36415 COLL VENOUS BLD VENIPUNCTURE: CPT | Performed by: PHYSICIAN ASSISTANT

## 2021-03-30 PROCEDURE — 99285 EMERGENCY DEPT VISIT HI MDM: CPT | Mod: 25 | Performed by: PHYSICIAN ASSISTANT

## 2021-03-30 PROCEDURE — 99283 EMERGENCY DEPT VISIT LOW MDM: CPT | Performed by: PHYSICIAN ASSISTANT

## 2021-03-30 PROCEDURE — 85025 COMPLETE CBC W/AUTO DIFF WBC: CPT | Performed by: PHYSICIAN ASSISTANT

## 2021-03-30 PROCEDURE — 83735 ASSAY OF MAGNESIUM: CPT | Performed by: PHYSICIAN ASSISTANT

## 2021-03-30 PROCEDURE — 258N000003 HC RX IP 258 OP 636: Performed by: PHYSICIAN ASSISTANT

## 2021-03-30 PROCEDURE — 80053 COMPREHEN METABOLIC PANEL: CPT | Performed by: PHYSICIAN ASSISTANT

## 2021-03-30 PROCEDURE — 74177 CT ABD & PELVIS W/CONTRAST: CPT

## 2021-03-30 PROCEDURE — 96375 TX/PRO/DX INJ NEW DRUG ADDON: CPT | Performed by: PHYSICIAN ASSISTANT

## 2021-03-30 PROCEDURE — 96365 THER/PROPH/DIAG IV INF INIT: CPT | Mod: XU | Performed by: PHYSICIAN ASSISTANT

## 2021-03-30 PROCEDURE — 83690 ASSAY OF LIPASE: CPT | Performed by: PHYSICIAN ASSISTANT

## 2021-03-30 PROCEDURE — C9113 INJ PANTOPRAZOLE SODIUM, VIA: HCPCS | Performed by: PHYSICIAN ASSISTANT

## 2021-03-30 PROCEDURE — 83605 ASSAY OF LACTIC ACID: CPT | Performed by: PHYSICIAN ASSISTANT

## 2021-03-30 RX ORDER — MAGNESIUM SULFATE HEPTAHYDRATE 40 MG/ML
2 INJECTION, SOLUTION INTRAVENOUS ONCE
Status: COMPLETED | OUTPATIENT
Start: 2021-03-30 | End: 2021-03-30

## 2021-03-30 RX ORDER — LORAZEPAM 2 MG/ML
0.5 INJECTION INTRAMUSCULAR ONCE
Status: COMPLETED | OUTPATIENT
Start: 2021-03-30 | End: 2021-03-30

## 2021-03-30 RX ORDER — IODIXANOL 320 MG/ML
100 INJECTION, SOLUTION INTRAVASCULAR ONCE
Status: COMPLETED | OUTPATIENT
Start: 2021-03-30 | End: 2021-03-30

## 2021-03-30 RX ADMIN — MAGNESIUM SULFATE IN WATER 2 G: 40 INJECTION, SOLUTION INTRAVENOUS at 19:23

## 2021-03-30 RX ADMIN — PANTOPRAZOLE SODIUM 40 MG: 40 INJECTION, POWDER, FOR SOLUTION INTRAVENOUS at 19:20

## 2021-03-30 RX ADMIN — IODIXANOL 100 ML: 320 INJECTION, SOLUTION INTRAVASCULAR at 18:48

## 2021-03-30 RX ADMIN — LORAZEPAM 0.5 MG: 2 INJECTION, SOLUTION INTRAMUSCULAR; INTRAVENOUS at 18:28

## 2021-03-30 RX ADMIN — SODIUM CHLORIDE 1000 ML: 9 INJECTION, SOLUTION INTRAVENOUS at 18:29

## 2021-03-30 ASSESSMENT — ENCOUNTER SYMPTOMS
ADENOPATHY: 0
NAUSEA: 0
FEVER: 0
BRUISES/BLEEDS EASILY: 0
BACK PAIN: 0
WOUND: 0
CHILLS: 0
HEMATURIA: 0
SHORTNESS OF BREATH: 0
CHEST TIGHTNESS: 0
ABDOMINAL PAIN: 1
VOMITING: 0
CONFUSION: 0

## 2021-03-30 ASSESSMENT — MIFFLIN-ST. JEOR: SCORE: 1656.34

## 2021-03-30 NOTE — TELEPHONE ENCOUNTER
"Pt called into triage with \"severe abdominal pain, I think I need to go to the ER but not sure.\" Verified name/.     Reported: left sided abdominal pain, started this morning.   Reports felt constipated for a few days. Took a laxative today, with 1 BM resulted in \"diarrhea like\". Hx of GERD, took baking soda this morning. Believes this is the cause of emesis after lunch. Hx of colitis, \"a couple years ago.\" Pain is currently rated 7/10, \"excruitiating. Nothing is helping\"  Tried repositioning. Pain has been constant since this morning. Tried drinking coffee, water. Denies urinary problems. Denies CP, SOB.   Per protocol, ED. Pt agrees and plans to go in to ED right away.       Reason for Disposition    SEVERE abdominal pain (e.g., excruciating)    Additional Information    Negative: Passed out (i.e., fainted, collapsed and was not responding)    Negative: Shock suspected (e.g., cold/pale/clammy skin, too weak to stand, low BP, rapid pulse)    Negative: Sounds like a life-threatening emergency to the triager    Negative: Chest pain    Negative: Pain is mainly in upper abdomen (if needed ask: 'is it mainly above the belly button?')    Answer Assessment - Initial Assessment Questions  1. LOCATION: \"Where does it hurt?\"       Left lower abdomen  2. RADIATION: \"Does the pain shoot anywhere else?\" (e.g., chest, back)      thigh  3. ONSET: \"When did the pain begin?\" (Minutes, hours or days ago)       This morning, all day, constant  4. SUDDEN: \"Gradual or sudden onset?\"      sudden  5. PATTERN \"Does the pain come and go, or is it constant?\"     - If constant: \"Is it getting better, staying the same, or worsening?\"       (Note: Constant means the pain never goes away completely; most serious pain is constant and it progresses)      - If intermittent: \"How long does it last?\" \"Do you have pain now?\"      (Note: Intermittent means the pain goes away completely between bouts)      constant  6. SEVERITY: \"How bad is the " "pain?\"  (e.g., Scale 1-10; mild, moderate, or severe)     - MILD (1-3): doesn't interfere with normal activities, abdomen soft and not tender to touch      - MODERATE (4-7): interferes with normal activities or awakens from sleep, tender to touch      - SEVERE (8-10): excruciating pain, doubled over, unable to do any normal activities        7/10 \"pt describes \"excruciating\"  7. RECURRENT SYMPTOM: \"Have you ever had this type of abdominal pain before?\" If so, ask: \"When was the last time?\" and \"What happened that time?\"       Colitis once in the past  8. CAUSE: \"What do you think is causing the abdominal pain?\"      ?constipation, colitis  9. RELIEVING/AGGRAVATING FACTORS: \"What makes it better or worse?\" (e.g., movement, antacids, bowel movement)      Nothing is helping: position, laxative, baking soda for acid reflux  10. OTHER SYMPTOMS: \"Has there been any vomiting, diarrhea, constipation, or urine problems?\"        Diarrhea, vomiting x1 after lunch, constipation for a few days.    Protocols used: ABDOMINAL PAIN - MALE-A-OH      "

## 2021-03-30 NOTE — ED TRIAGE NOTES
"Pt here by himself, pt reports lt lower quadrant pain that radiates into his hip that he woke up with this AM, pt reports some constipation today, pt out into waiting room to wait for ED room  ED Nursing Triage Note (General)   ________________________________    Augie De Jesus is a 60 year old Male that presents to triage private car  With history of  Abdominal pain reported by patient   Significant symptoms had onset this AM   Temp 97.9  F (36.6  C) (Temporal)   Ht 1.676 m (5' 6\")   Wt 89.4 kg (197 lb)   BMI 31.80 kg/m  t  Patient appears alert  and oriented, in no acute distress., and cooperative and pleasant behavior.    GCS Total = 15  Airway: intact  Breathing noted as Normal.  Circulation Normal  Skin normal, warm  Action taken:  Pt out into waiting room      PRE HOSPITAL PRIOR LIVING SITUATION Alone    "

## 2021-03-31 NOTE — DISCHARGE INSTRUCTIONS
Get plenty of fluids and rest. You can take Tylenol and ibuprofen to see if that would help with your discomfort, you can also try ice or heat. As we discussed, in general your lab work appears very well and your CT scan shows no acute abdominal findings but they do see some groundglass opacities in your lungs, it is difficult to determine what is causing this. They do recommend that she obtain a nonemergent CT scan of your chest. I recommend you decrease your alcohol intake. A referral is placed for you to follow-up with PCP for reassessment and to discuss further chest imaging. Please return to the ED if there is worsening or concerning symptoms especially intractable pain, increased fevers, etc.

## 2021-03-31 NOTE — ED PROVIDER NOTES
History     Chief Complaint   Patient presents with     Abdominal Pain     HPI  Augie De Jesus is a 60 year old male who presents to the ED today for evaluation of abdominal pain. Patient reports he woke up this morning with some left-sided abdominal discomfort. He says that his pain has felt fairly consistent and does radiate into his left hip. He denies any chest pain, shortness of breath, dysuria, penile discharge, testicular pain, fevers, diarrhea. The patient does say that he is a alcoholic and has had past pancreatitis but that this feels different. No past history of kidney stones.    Allergies:  No Known Allergies    Problem List:    Patient Active Problem List    Diagnosis Date Noted     Microcytic anemia 03/19/2020     Priority: Medium     Alcohol dependence in remission (H) 02/22/2018     Priority: Medium     CMC arthritis 10/24/2016     Priority: Medium     Primary osteoarthritis of both first carpometacarpal joints 10/20/2016     Priority: Medium     YARELIS (obstructive sleep apnea) 06/23/2015     Priority: Medium     S/P total knee arthroplasty 06/23/2015     Priority: Medium     External hemorrhoids 05/04/2011     Priority: Medium     Gastroesophageal reflux disease 10/15/2010     Priority: Medium     Hypertension 05/12/2010     Priority: Medium     Depression, major 03/24/2010     Priority: Medium        Past Medical History:    Past Medical History:   Diagnosis Date     Alcohol-induced acute pancreatitis without infection or necrosis      Chondromalacia of knee      Essential (primary) hypertension      Gastro-esophageal reflux disease without esophagitis      Major depressive disorder, single episode      Obstructive sleep apnea      Other specified cardiac arrhythmias (CODE)      Other tear of medial meniscus, current injury, unspecified knee, initial encounter      Personal history of other medical treatment (CODE)      Primary osteoarthritis of both first carpometacarpal joints      Primary  osteoarthritis of hand      Primary osteoarthritis of one knee      Residual hemorrhoidal skin tags      Sciatica      Uncomplicated alcohol dependence (H)        Past Surgical History:    Past Surgical History:   Procedure Laterality Date     ARTHROPLASTY ANKLE Right 2017     ARTHROPLASTY KNEE Left     6/25/15,Knee Replacement, Total Dr. Hernandez     COLONOSCOPY  03/16/2018    Normal follow up 2028     COLONOSCOPY N/A 3/16/2018    Procedure: COLONOSCOPY;  Colonoscopy;  Surgeon: Jimmy Phelps MD;  Location: [facility] OR       Family History:    Family History   Problem Relation Age of Onset     Heart Disease Mother         Heart Disease, heart failure     Substance Abuse Father         Alcohol/Drug     Cancer Sister         Cancer,uterine cancer       Social History:  Marital Status:  Single [1]  Social History     Tobacco Use     Smoking status: Never Smoker     Smokeless tobacco: Never Used   Substance Use Topics     Alcohol use: Yes     Frequency: Monthly or less     Drug use: Not Currently     Types: Other     Comment: Drug use: No        Medications:    acetaminophen (TYLENOL) 500 MG tablet  buPROPion (WELLBUTRIN XL) 150 MG 24 hr tablet  DULoxetine (CYMBALTA) 60 MG capsule  naltrexone (DEPADE/REVIA) 50 MG tablet  omeprazole (PRILOSEC) 20 MG DR capsule  omeprazole 20 MG tablet          Review of Systems   Constitutional: Negative for chills and fever.   HENT: Negative for congestion.    Eyes: Negative for visual disturbance.   Respiratory: Negative for chest tightness and shortness of breath.    Cardiovascular: Negative for chest pain.   Gastrointestinal: Positive for abdominal pain. Negative for nausea and vomiting.   Genitourinary: Negative for hematuria.   Musculoskeletal: Negative for back pain.   Skin: Negative for rash and wound.   Neurological: Negative for syncope.   Hematological: Negative for adenopathy. Does not bruise/bleed easily.   Psychiatric/Behavioral: Negative for confusion.       Physical Exam   BP:  "(!) 166/91  Pulse: 105  Temp: 97.9  F (36.6  C)  Height: 167.6 cm (5' 6\")  Weight: 89.4 kg (197 lb)  SpO2: 97 %      Physical Exam  Constitutional:       General: He is not in acute distress.     Appearance: He is well-developed. He is not diaphoretic.   HENT:      Head: Normocephalic and atraumatic.   Eyes:      General: No scleral icterus.     Conjunctiva/sclera: Conjunctivae normal.   Neck:      Musculoskeletal: Neck supple.   Cardiovascular:      Rate and Rhythm: Normal rate and regular rhythm.   Pulmonary:      Effort: Pulmonary effort is normal.      Breath sounds: Normal breath sounds.   Abdominal:      Palpations: Abdomen is soft.      Tenderness: There is abdominal tenderness.      Comments: Left-sided abdominal pain   Musculoskeletal:         General: No deformity.   Lymphadenopathy:      Cervical: No cervical adenopathy.   Skin:     General: Skin is warm and dry.      Findings: No rash.   Neurological:      Mental Status: He is alert and oriented to person, place, and time. Mental status is at baseline.   Psychiatric:         Mood and Affect: Mood normal.         Behavior: Behavior normal.         Thought Content: Thought content normal.         Judgment: Judgment normal.         ED Course        Procedures               Critical Care time:  none               Results for orders placed or performed during the hospital encounter of 03/30/21 (from the past 24 hour(s))   CBC with platelets differential   Result Value Ref Range    WBC 5.8 4.0 - 11.0 10e9/L    RBC Count 4.75 4.4 - 5.9 10e12/L    Hemoglobin 9.1 (L) 13.3 - 17.7 g/dL    Hematocrit 31.1 (L) 40.0 - 53.0 %    MCV 66 (L) 78 - 100 fl    MCH 19.2 (L) 26.5 - 33.0 pg    MCHC 29.3 (L) 31.5 - 36.5 g/dL    RDW 20.5 (H) 10.0 - 15.0 %    Platelet Count 188 150 - 450 10e9/L    Diff Method Automated Method     % Neutrophils 71.8 %    % Lymphocytes 13.0 %    % Monocytes 11.6 %    % Eosinophils 2.6 %    % Basophils 0.7 %    % Immature Granulocytes 0.3 %    " Absolute Neutrophil 4.2 1.6 - 8.3 10e9/L    Absolute Lymphocytes 0.8 0.8 - 5.3 10e9/L    Absolute Monocytes 0.7 0.0 - 1.3 10e9/L    Absolute Eosinophils 0.2 0.0 - 0.7 10e9/L    Absolute Basophils 0.0 0.0 - 0.2 10e9/L    Abs Immature Granulocytes 0.0 0 - 0.4 10e9/L   Comprehensive metabolic panel   Result Value Ref Range    Sodium 132 (L) 134 - 144 mmol/L    Potassium 3.4 (L) 3.5 - 5.1 mmol/L    Chloride 94 (L) 98 - 107 mmol/L    Carbon Dioxide 30 21 - 31 mmol/L    Anion Gap 8 3 - 14 mmol/L    Glucose 105 70 - 105 mg/dL    Urea Nitrogen 14 7 - 25 mg/dL    Creatinine 0.87 0.70 - 1.30 mg/dL    GFR Estimate >90 >60 mL/min/[1.73_m2]    GFR Estimate If Black >90 >60 mL/min/[1.73_m2]    Calcium 9.0 8.6 - 10.3 mg/dL    Bilirubin Total 0.7 0.3 - 1.0 mg/dL    Albumin 4.3 3.5 - 5.7 g/dL    Protein Total 7.9 6.4 - 8.9 g/dL    Alkaline Phosphatase 61 34 - 104 U/L    ALT 23 7 - 52 U/L    AST 25 13 - 39 U/L   Lipase   Result Value Ref Range    Lipase 12 11 - 82 U/L   Lactic acid whole blood   Result Value Ref Range    Lactic Acid 1.2 0.7 - 2.0 mmol/L   Magnesium   Result Value Ref Range    Magnesium 1.6 (L) 1.9 - 2.7 mg/dL   CT Abdomen Pelvis w Contrast    Narrative    PROCEDURE:  CT ABDOMEN PELVIS W CONTRAST    HISTORY: Left sided abdominal pain.    TECHNIQUE:  Helical CT of the abdomen and pelvis was performed  following injection of intravenous contrast.      COMPARISON:  12/19/2017    MEDS/CONTRAST: 100 ml visipaque 320    FINDINGS:      Limited images through the lung bases demonstrate worsening patchy  groundglass and reticular changes. There is a moderate hiatal hernia.  There is thickening of the distal esophagus.    The liver demonstrates no mass or intrahepatic biliary ductal  dilatation.  The gallbladder, spleen, pancreas and adrenal glands are  unremarkable. A cyst is present in the left renal cortex Symmetric  nephrograms are present without hydronephrosis. There is no abdominal  aortic aneurysm.    The bowel is  normal in caliber. Multiple distal colonic diverticula  are present without focal peridiverticular inflammatory changes.    No free fluid, free air or adenopathy is present. Degenerative changes  are noted in the low lumbar spine. No suspicious osseous lesions are  identified.      Impression    IMPRESSION:      Diverticulosis without diverticulitis.    Worsening bibasilar pulmonary ground glass and reticular opacities may  be accentuated by the phase of respiration. Underlying progressive  pulmonary fibrosis is also in the differential. Recommend follow-up  nonemergent CT chest in deep inspiration.    ETHEL MONSALVE MD   UA reflex to Microscopic   Result Value Ref Range    Color Urine Yellow     Appearance Urine Clear     Glucose Urine Negative NEG^Negative mg/dL    Bilirubin Urine Negative NEG^Negative    Ketones Urine Negative NEG^Negative mg/dL    Specific Gravity Urine 1.042 (H) 1.003 - 1.035    Blood Urine Negative NEG^Negative    pH Urine 7.5 (H) 5.0 - 7.0 pH    Protein Albumin Urine 10 (A) NEG^Negative mg/dL    Urobilinogen mg/dL Normal 0.0 - 2.0 mg/dL    Nitrite Urine Negative NEG^Negative    Leukocyte Esterase Urine Negative NEG^Negative    Source Midstream Urine     RBC Urine <1 0 - 2 /HPF    WBC Urine 1 0 - 5 /HPF    Bacteria Urine Few (A) NEG^Negative /HPF    Mucous Urine Present (A) NEG^Negative /LPF       Medications   0.9% sodium chloride BOLUS (1,000 mLs Intravenous New Bag 3/30/21 1829)   LORazepam (ATIVAN) injection 0.5 mg (0.5 mg Intravenous Given 3/30/21 1828)   iodixanol (VISIPAQUE 320) injection 100 mL (100 mLs Intravenous Given 3/30/21 1848)   pantoprazole (PROTONIX) IV push injection 40 mg (40 mg Intravenous Given 3/30/21 1920)   magnesium sulfate 2 g in water intermittent infusion (2 g Intravenous New Bag 3/30/21 1923)       Assessments & Plan (with Medical Decision Making)   Patient is nontoxic but slightly anxious appearing. Heart, lung, bowel sounds are normal. Abdomen appears soft  with slight tenderness to palpation of the left side. I do offered to perform testicle exam but he declines and says that he is having no discomfort or abnormalities with his testicles. Vital signs are stable he is afebrile.    Patient has no leukocytosis, he is a chronic anemia with hemoglobin 9.1. Lactic acid is normal, CMP shows a very mild hypokalemia, magnesium is 1.6, lipase is normal. Urinalysis does have an elevated specific gravity but otherwise appears noninfectious.    CT of abdomen read as:  Diverticulosis without diverticulitis.     Worsening bibasilar pulmonary ground glass and reticular opacities may  be accentuated by the phase of respiration. Underlying progressive  pulmonary fibrosis is also in the differential. Recommend follow-up  nonemergent CT chest in deep inspiration.    Patient is given magnesium and a dose of Ativan. He does appear to be doing much better in the ED. It is not entirely clear what is causing his symptoms at this time. However, I am very encouraged by his well appearance as well as a stable vital signs and reassuring diagnostic studies. I do offer him further resources as well as voluntary admission to detox, he declines all of those. A referral is placed for him to follow-up with PCP for reassessment. He will continue with conservative treatment at home and he is encouraged to decrease his alcohol consumption.    Strict return precautions are given to the pt, they will return if symptoms are worsening or concerning. The pt understands and agrees with the plan and they are discharged.     Juan Jose Lopez PA-C    I have reviewed the nursing notes.    I have reviewed the findings, diagnosis, plan and need for follow up with the patient.       New Prescriptions    No medications on file       Final diagnoses:   Abdominal pain, left lower quadrant   Anemia   Ground glass opacity present on imaging of lung       3/30/2021   [facility],  GERARD Ingram  03/30/21 1959       Juan Jose Lopez PA  03/30/21 2000

## 2021-04-01 ENCOUNTER — TELEPHONE (OUTPATIENT)
Dept: FAMILY MEDICINE | Facility: OTHER | Age: 61
End: 2021-04-01

## 2021-04-06 ENCOUNTER — MYC MEDICAL ADVICE (OUTPATIENT)
Dept: FAMILY MEDICINE | Facility: OTHER | Age: 61
End: 2021-04-06

## 2021-04-06 NOTE — TELEPHONE ENCOUNTER
Patient was scheduled with pcp for follow up on  medication.    Yudelka Jj LPN on 4/6/2021 at 2:19 PM

## 2021-04-07 ENCOUNTER — HOSPITAL ENCOUNTER (OUTPATIENT)
Dept: GENERAL RADIOLOGY | Facility: OTHER | Age: 61
End: 2021-04-07
Attending: FAMILY MEDICINE
Payer: COMMERCIAL

## 2021-04-07 ENCOUNTER — OFFICE VISIT (OUTPATIENT)
Dept: FAMILY MEDICINE | Facility: OTHER | Age: 61
End: 2021-04-07
Attending: FAMILY MEDICINE
Payer: COMMERCIAL

## 2021-04-07 VITALS
OXYGEN SATURATION: 98 % | RESPIRATION RATE: 22 BRPM | DIASTOLIC BLOOD PRESSURE: 100 MMHG | WEIGHT: 198.4 LBS | HEART RATE: 117 BPM | HEIGHT: 66 IN | BODY MASS INDEX: 31.88 KG/M2 | TEMPERATURE: 95.7 F | SYSTOLIC BLOOD PRESSURE: 149 MMHG

## 2021-04-07 DIAGNOSIS — M62.81 QUADRICEPS WEAKNESS: Primary | ICD-10-CM

## 2021-04-07 DIAGNOSIS — Z96.652 HISTORY OF TOTAL KNEE ARTHROPLASTY, LEFT: ICD-10-CM

## 2021-04-07 DIAGNOSIS — S83.422A SPRAIN OF LATERAL COLLATERAL LIGAMENT OF LEFT KNEE, INITIAL ENCOUNTER: ICD-10-CM

## 2021-04-07 PROCEDURE — 99214 OFFICE O/P EST MOD 30 MIN: CPT | Performed by: FAMILY MEDICINE

## 2021-04-07 PROCEDURE — G0463 HOSPITAL OUTPT CLINIC VISIT: HCPCS | Mod: 25

## 2021-04-07 PROCEDURE — G0463 HOSPITAL OUTPT CLINIC VISIT: HCPCS

## 2021-04-07 PROCEDURE — 73560 X-RAY EXAM OF KNEE 1 OR 2: CPT | Mod: RT

## 2021-04-07 RX ORDER — DICLOFENAC SODIUM 75 MG/1
75 TABLET, DELAYED RELEASE ORAL 2 TIMES DAILY
Qty: 60 TABLET | Refills: 1 | Status: SHIPPED | OUTPATIENT
Start: 2021-04-07 | End: 2021-06-15

## 2021-04-07 SDOH — HEALTH STABILITY: MENTAL HEALTH: HOW OFTEN DO YOU HAVE 6 OR MORE DRINKS ON ONE OCCASION?: WEEKLY

## 2021-04-07 SDOH — HEALTH STABILITY: MENTAL HEALTH: HOW OFTEN DO YOU HAVE A DRINK CONTAINING ALCOHOL?: 2-4 TIMES A MONTH

## 2021-04-07 SDOH — HEALTH STABILITY: MENTAL HEALTH: HOW MANY STANDARD DRINKS CONTAINING ALCOHOL DO YOU HAVE ON A TYPICAL DAY?: 10 OR MORE

## 2021-04-07 ASSESSMENT — MIFFLIN-ST. JEOR: SCORE: 1662.69

## 2021-04-07 ASSESSMENT — PATIENT HEALTH QUESTIONNAIRE - PHQ9: SUM OF ALL RESPONSES TO PHQ QUESTIONS 1-9: 13

## 2021-04-07 ASSESSMENT — PAIN SCALES - GENERAL: PAINLEVEL: EXTREME PAIN (8)

## 2021-04-07 NOTE — NURSING NOTE
"Chief Complaint   Patient presents with     Knee Pain     left knee pain that radiates up thigh, pain 8/10, unsure of injury     Patient presents to clinic today for left knee pain that radiates up his thigh. Pain 8/10. The pain has been present for 1 week. He did have his left knee replaced on 6/25/15 and he notes that his left knee feels like it did post op. He also notes that if he puts all of his weight on his left side his left knee will buckle and he falls over. He is unsure of injury that is causing his pain today but is pretty sure his knee buckled.     Initial BP (!) 160/96 (BP Location: Right arm, Patient Position: Sitting, Cuff Size: Adult Regular)   Pulse 117   Temp 95.7  F (35.4  C) (Tympanic)   Resp 22   Ht 1.676 m (5' 6\")   Wt 90 kg (198 lb 6.4 oz)   SpO2 98%   BMI 32.02 kg/m   Estimated body mass index is 32.02 kg/m  as calculated from the following:    Height as of this encounter: 1.676 m (5' 6\").    Weight as of this encounter: 90 kg (198 lb 6.4 oz).         Medication Reconciliation: Complete      Ambika Adams LPN   "

## 2021-04-07 NOTE — PROGRESS NOTES
"HPI:  60-year-old male with a history of a TKA of the left knee in 2015 and a TKA of the right knee in 2016 coming in for evaluation of left knee pain.  Patient reports that he feels like he has had instability of the left knee on and off while ambulating for the last 4 months.  In the last week he has developed pain.  He feels like the pain is around the kneecap.  Some of his pain will radiate up into the anterior thigh.  Pain mostly occurs in the upright position.  His current pain is 8/10.  He characterizes the pain as throbbing and burning.  He has been using a brace to try to help with the feeling of instability.  He has also been taking Tylenol, ibuprofen, and Aleve with minimal relief of pain.  No symptoms extending below the knee.    EXAM:  BP (!) 149/100 (BP Location: Right arm, Patient Position: Sitting, Cuff Size: Adult Regular)   Pulse 117   Temp 95.7  F (35.4  C) (Tympanic)   Resp 22   Ht 1.676 m (5' 6\")   Wt 90 kg (198 lb 6.4 oz)   SpO2 98%   BMI 32.02 kg/m    MUSCULOSKELETAL EXAM:  LEFT KNEE  Inspection:  -No gross deformity  -No bruising or soft tissue swelling  -No warmth or redness  -Scars:  None    Tenderness to palpation of the:  -Quadriceps musculature: Mild pain  -Quadriceps tendon: Mild pain  -Patella:  Negative  -Medial patellar facet: Difficult to evaluate  -Lateral patellar facet: Difficult to evaluate  -Inferior pole of the patella:  Negative  -Patellar tendon:  Negative  -Medial joint line: Positive  -Medial collateral ligament: Mild pain  -Medial hamstring tendons:  Negative  -Pes anserine bursa: Mild pain  -Lateral joint line: Positive  -Distal IT band: Positive  -Lateral collateral ligament: Positive  -Lateral hamstring tendons:  Negative  -Posterior lateral corner:  Negative  -Popliteal fossa:  Negative    Range of Motion:  -Passive flexion:  120 with pain  -Passive extension: Just short of full extension    Strength:  -Extension:  4/5 secondary to pain  -Flexion:  4+/5 " secondary to pain    Special Tests:  -Effusion:  Absent  -Medial patellar glide: Mild pain  -Lateral patellar glide: Mild pain  -Patellar apprehension:  Negative  -Valgus stress:  Negative  -Varus stress: Pain without laxity compared to contralateral side    Other:  -Intact sensation to light touch distally.  -No signs of cyanosis. Normal skin temperature of the lower extremity.  -Foot/ankle:  No gross deformity. Full range of motion.  -Right knee:  No gross deformity. No palpable tenderness. Normal strength and ROM.      IMAGIN2021: 4 view left knee x-ray  -Hardware from previous bilateral TKAs present.  No signs of damage or loosening.    2016: 3 view right knee x-ray  -Hardware from previous TKA on the left noted without signs of damage or loosening.  Right knee with moderate degeneration of the patellofemoral compartment.  On the lateral view there is calcifications within the suprapatellar pouch versus in the quadriceps musculature.      ASSESSMENT/PLAN:  Diagnoses and all orders for this visit:  Quadriceps weakness  -     XR Knee Standing 2v  Bilateral & 2v Left  -     diclofenac (VOLTAREN) 75 MG EC tablet; Take 1 tablet (75 mg) by mouth 2 times daily With food, x5-7days then prn thereafter  -     PHYSICAL THERAPY REFERRAL; Future  Sprain of lateral collateral ligament of left knee, initial encounter  -     XR Knee Standing 2v  Bilateral & 2v Left  -     diclofenac (VOLTAREN) 75 MG EC tablet; Take 1 tablet (75 mg) by mouth 2 times daily With food, x5-7days then prn thereafter  -     PHYSICAL THERAPY REFERRAL; Future  History of total knee arthroplasty, left  -     XR Knee Standing 2v  Bilateral & 2v Left  -     diclofenac (VOLTAREN) 75 MG EC tablet; Take 1 tablet (75 mg) by mouth 2 times daily With food, x5-7days then prn thereafter  -     PHYSICAL THERAPY REFERRAL; Future    60-year-old male with previous bilateral TKA coming in for evaluation of left knee pain.  On exam patient has notable  quadriceps weakness on the left compared to the right as well as pain and tenderness at the LCL.  Concerned that patient has weakness of the quadriceps which is led to worsening pain and reliant on the collateral ligaments to support the joint.  X-rays from today and 2016 were both personally reviewed in the office with the findings as demonstrated above by my interpretation.  No evidence of abnormalities with the hardware.  Suspect that physical therapy can help regain some stability and improve the pain.  As I am not a surgeon I would feel more comfortable with an evaluation with orthopedics to assess this joint as there is orthopedic hardware present.  -Patient to make appointment with orthopedics  -Referral placed to physical therapy  -Diclofenac 75 mg twice daily x5-7 days then as needed thereafter  -Follow-up as needed    Additionally, noted high score on PHQ-9 as well as significant anemia from labs from ER visit.  It was recommended that patient have follow-up visit with PCP.    Juan Marroquin MD  4/7/2021  9:30 AM    Total time spent with this patient was 39 minutes which included chart review, visualization and interpretation of images, time spent with the patient, and documentation.

## 2021-04-09 ENCOUNTER — OFFICE VISIT (OUTPATIENT)
Dept: FAMILY MEDICINE | Facility: OTHER | Age: 61
End: 2021-04-09
Attending: FAMILY MEDICINE
Payer: COMMERCIAL

## 2021-04-09 VITALS
WEIGHT: 199.2 LBS | HEART RATE: 84 BPM | OXYGEN SATURATION: 98 % | DIASTOLIC BLOOD PRESSURE: 88 MMHG | RESPIRATION RATE: 16 BRPM | TEMPERATURE: 97.9 F | HEIGHT: 66 IN | SYSTOLIC BLOOD PRESSURE: 138 MMHG | BODY MASS INDEX: 32.02 KG/M2

## 2021-04-09 DIAGNOSIS — R10.32 ABDOMINAL PAIN, LEFT LOWER QUADRANT: ICD-10-CM

## 2021-04-09 DIAGNOSIS — F10.20 ALCOHOLISM (H): ICD-10-CM

## 2021-04-09 DIAGNOSIS — K59.00 CONSTIPATION, UNSPECIFIED CONSTIPATION TYPE: ICD-10-CM

## 2021-04-09 DIAGNOSIS — F32.9 MAJOR DEPRESSIVE DISORDER WITH SINGLE EPISODE, REMISSION STATUS UNSPECIFIED: ICD-10-CM

## 2021-04-09 DIAGNOSIS — D50.9 MICROCYTIC ANEMIA: Primary | ICD-10-CM

## 2021-04-09 PROCEDURE — 99214 OFFICE O/P EST MOD 30 MIN: CPT | Performed by: FAMILY MEDICINE

## 2021-04-09 PROCEDURE — G0463 HOSPITAL OUTPT CLINIC VISIT: HCPCS

## 2021-04-09 RX ORDER — BUPROPION HYDROCHLORIDE 300 MG/1
300 TABLET ORAL EVERY MORNING
Qty: 30 TABLET | Refills: 11 | Status: SHIPPED | OUTPATIENT
Start: 2021-04-09

## 2021-04-09 RX ORDER — POLYETHYLENE GLYCOL 3350 17 G/17G
17 POWDER, FOR SOLUTION ORAL DAILY
Qty: 510 G | Refills: 11 | Status: SHIPPED | OUTPATIENT
Start: 2021-04-09 | End: 2021-06-15

## 2021-04-09 RX ORDER — ACAMPROSATE CALCIUM 333 MG/1
666 TABLET, DELAYED RELEASE ORAL 3 TIMES DAILY
Qty: 180 TABLET | Refills: 11 | Status: SHIPPED | OUTPATIENT
Start: 2021-04-09

## 2021-04-09 ASSESSMENT — ANXIETY QUESTIONNAIRES
3. WORRYING TOO MUCH ABOUT DIFFERENT THINGS: NEARLY EVERY DAY
7. FEELING AFRAID AS IF SOMETHING AWFUL MIGHT HAPPEN: SEVERAL DAYS
1. FEELING NERVOUS, ANXIOUS, OR ON EDGE: NOT AT ALL
6. BECOMING EASILY ANNOYED OR IRRITABLE: NOT AT ALL
5. BEING SO RESTLESS THAT IT IS HARD TO SIT STILL: NOT AT ALL
2. NOT BEING ABLE TO STOP OR CONTROL WORRYING: NEARLY EVERY DAY
IF YOU CHECKED OFF ANY PROBLEMS ON THIS QUESTIONNAIRE, HOW DIFFICULT HAVE THESE PROBLEMS MADE IT FOR YOU TO DO YOUR WORK, TAKE CARE OF THINGS AT HOME, OR GET ALONG WITH OTHER PEOPLE: SOMEWHAT DIFFICULT
GAD7 TOTAL SCORE: 7

## 2021-04-09 ASSESSMENT — PAIN SCALES - GENERAL: PAINLEVEL: MODERATE PAIN (5)

## 2021-04-09 ASSESSMENT — PATIENT HEALTH QUESTIONNAIRE - PHQ9
5. POOR APPETITE OR OVEREATING: NOT AT ALL
SUM OF ALL RESPONSES TO PHQ QUESTIONS 1-9: 16

## 2021-04-09 ASSESSMENT — MIFFLIN-ST. JEOR: SCORE: 1666.32

## 2021-04-09 NOTE — NURSING NOTE
"Patient presents today for ED follow up and medications. Patient states,\"his naltrexone is not helping\".    Medication Reconciliation Complete    Yudelka Jj LPN  4/9/2021 10:20 AM  "

## 2021-04-09 NOTE — PROGRESS NOTES
"SUBJECTIVE:  Augie De Jesus is a 60 year old male here for several concerns.  First of all he has a history of alcoholism.  He reports that he drinks 4-5 times a month but when he does drink he blacks out and passes out.  He has tried naltrexone in the past which is not been helpful.  He has read about Campral and is interested in trying that.    He has a history of depression and continues on Cymbalta 60 mg daily as well as Wellbutrin 150 mg daily.  He has tried to see mental health in the past but he \"does not believe in it.\"    Finally he was seen emergency room approximate 10 days ago for left lower abdominal pain.  CT scan at that time showed diverticulosis without any other findings related to his abdomen.  He notes that he feels constipated and has tried over-the-counter laxatives without any improvement.  He has known iron deficiency anemia that has been seen over the last couple of blood draws.  He does not use any iron supplements.    Allergies:  No Known Allergies    ROS:    As above otherwise ROS is unremarkable.    OBJECTIVE:  /88   Pulse 84   Temp 97.9  F (36.6  C)   Resp 16   Ht 1.676 m (5' 6\")   Wt 90.4 kg (199 lb 3.2 oz)   SpO2 98%   BMI 32.15 kg/m      EXAM:  General Appearance: Pleasant, alert, appropriate appearance for age. No acute distress  Head: Normal. Normocephalic, atraumatic.  Lungs: Normal chest wall and respirations. Clear to auscultation, no wheezes or crackles.  Cardiovascular: Regular rate and rhythm. S1, S2, no murmurs.  Gastrointestinal: Soft, nontender, no abnormal masses or organomegaly. BS normal.  Skin: no concerning or new rashes.  Neurologic Exam: CN 2-12 grossly intact.  Normal gait.    Psychiatric Exam: Alert and oriented, appropriate affect.  No psychomotor agitation or retardation.  No hallucinations.  No thoughts of harming himself or others.    PHQ 12/7/2020 4/7/2021 4/9/2021   PHQ-9 Total Score 8 13 16   Q9: Thoughts of better off dead/self-harm past 2 " weeks Not at all Not at all Several days     JENNIFER-7 SCORE 4/23/2019 5/21/2019 4/9/2021   Total Score 0 2 7           ASSESSEMENT AND PLAN:    1. Microcytic anemia    2. Abdominal pain, left lower quadrant    3. Alcoholism (H)    4. Constipation, unspecified constipation type    5. Major depressive disorder with single episode, remission status unspecified      For his continuing abdominal pain, microcytic anemia and history of diverticulosis recommend that we proceed with endoscopy and colonoscopy.  Referral was placed.  Further recommendations will be pending those results.    In regards to his alcoholism I think would be reasonable to stop naltrexone and trial of Campral 666 mg 3 times daily.  Potential side effects were discussed.    For his depression we will increase Wellbutrin to 300 mg daily and continue Cymbalta 60 mg daily.  We discussed mental health referral and psychotherapy however he is not interested at this time.    For his constipation will trial MiraLAX.  Encouraged increasing fiber and water intake.    Berto Ocasio MD  Family Medicine

## 2021-04-10 ASSESSMENT — ANXIETY QUESTIONNAIRES: GAD7 TOTAL SCORE: 7

## 2021-04-12 DIAGNOSIS — F32.9 MAJOR DEPRESSIVE DISORDER WITH SINGLE EPISODE, REMISSION STATUS UNSPECIFIED: ICD-10-CM

## 2021-04-12 NOTE — TELEPHONE ENCOUNTER
[id number] sent Rx request for the following:   DULoxetine (CYMBALTA) 60 MG capsule  Sig: TAKE 1 CAPSULE (60 MG) BY MOUTH DAILY    Last Prescription Date:   04/17/2020  Last Fill Qty/Refills:         90, R-3    Last Office Visit:              04/09/2021 (Ninfa)   Future Office visit:           None noted   Serotonin-Norepinephrine Reuptake Inhibitors  Failed - 4/12/2021  1:00 AM        Failed - PHQ-9 score of less than 5 in past 6 months     Please review last PHQ-9 score.      04/09/2021 value of 16  Routing for PCP review.  Unable to complete prescription refill per RN Medication Refill Policy.................... Vanda Bowling RN ....................  4/12/2021   4:03 PM

## 2021-04-13 RX ORDER — DULOXETIN HYDROCHLORIDE 60 MG/1
60 CAPSULE, DELAYED RELEASE ORAL DAILY
Qty: 90 CAPSULE | Refills: 3 | Status: SHIPPED | OUTPATIENT
Start: 2021-04-13

## 2021-04-23 ENCOUNTER — MYC MEDICAL ADVICE (OUTPATIENT)
Dept: FAMILY MEDICINE | Facility: OTHER | Age: 61
End: 2021-04-23

## 2021-04-23 DIAGNOSIS — F32.9 MAJOR DEPRESSIVE DISORDER WITH SINGLE EPISODE, REMISSION STATUS UNSPECIFIED: ICD-10-CM

## 2021-04-23 RX ORDER — BUPROPION HYDROCHLORIDE 150 MG/1
150 TABLET ORAL EVERY MORNING
Qty: 30 TABLET | Refills: 11 | OUTPATIENT
Start: 2021-04-23

## 2021-04-23 NOTE — TELEPHONE ENCOUNTER
The med strength has been discontinued, adjustment to strength. Vanda Bowling RN ....................  4/23/2021   9:42 AM

## 2021-04-25 ENCOUNTER — HOSPITAL ENCOUNTER (EMERGENCY)
Facility: OTHER | Age: 61
Discharge: HOME | End: 2021-04-25
Attending: STUDENT IN AN ORGANIZED HEALTH CARE EDUCATION/TRAINING PROGRAM | Admitting: STUDENT IN AN ORGANIZED HEALTH CARE EDUCATION/TRAINING PROGRAM
Payer: COMMERCIAL

## 2021-04-25 VITALS
BODY MASS INDEX: 31.08 KG/M2 | TEMPERATURE: 98.4 F | DIASTOLIC BLOOD PRESSURE: 98 MMHG | SYSTOLIC BLOOD PRESSURE: 185 MMHG | OXYGEN SATURATION: 95 % | HEIGHT: 67 IN | RESPIRATION RATE: 18 BRPM | HEART RATE: 113 BPM | WEIGHT: 198 LBS

## 2021-04-25 DIAGNOSIS — M79.652 PAIN OF LEFT THIGH: ICD-10-CM

## 2021-04-25 DIAGNOSIS — I10 ESSENTIAL HYPERTENSION: ICD-10-CM

## 2021-04-25 LAB
BASOPHILS # BLD AUTO: 0.1 10E9/L (ref 0–0.2)
BASOPHILS NFR BLD AUTO: 0.7 %
CRP SERPL-MCNC: 20.9 MG/L
DIFFERENTIAL METHOD BLD: ABNORMAL
EOSINOPHIL # BLD AUTO: 0 10E9/L (ref 0–0.7)
EOSINOPHIL NFR BLD AUTO: 0.4 %
ERYTHROCYTE [DISTWIDTH] IN BLOOD BY AUTOMATED COUNT: 21.6 % (ref 10–15)
HCT VFR BLD AUTO: 34.4 % (ref 40–53)
HGB BLD-MCNC: 10.4 G/DL (ref 13.3–17.7)
IMM GRANULOCYTES # BLD: 0.1 10E9/L (ref 0–0.4)
IMM GRANULOCYTES NFR BLD: 0.5 %
LYMPHOCYTES # BLD AUTO: 1.1 10E9/L (ref 0.8–5.3)
LYMPHOCYTES NFR BLD AUTO: 11 %
MAGNESIUM SERPL-MCNC: 1.9 MG/DL (ref 1.9–2.7)
MCH RBC QN AUTO: 19.8 PG (ref 26.5–33)
MCHC RBC AUTO-ENTMCNC: 30.2 G/DL (ref 31.5–36.5)
MCV RBC AUTO: 65 FL (ref 78–100)
MONOCYTES # BLD AUTO: 1.1 10E9/L (ref 0–1.3)
MONOCYTES NFR BLD AUTO: 11.3 %
NEUTROPHILS # BLD AUTO: 7.4 10E9/L (ref 1.6–8.3)
NEUTROPHILS NFR BLD AUTO: 76.1 %
PLATELET # BLD AUTO: 365 10E9/L (ref 150–450)
RBC # BLD AUTO: 5.26 10E12/L (ref 4.4–5.9)
WBC # BLD AUTO: 9.8 10E9/L (ref 4–11)

## 2021-04-25 PROCEDURE — 96374 THER/PROPH/DIAG INJ IV PUSH: CPT | Performed by: STUDENT IN AN ORGANIZED HEALTH CARE EDUCATION/TRAINING PROGRAM

## 2021-04-25 PROCEDURE — 96376 TX/PRO/DX INJ SAME DRUG ADON: CPT | Performed by: STUDENT IN AN ORGANIZED HEALTH CARE EDUCATION/TRAINING PROGRAM

## 2021-04-25 PROCEDURE — 99283 EMERGENCY DEPT VISIT LOW MDM: CPT | Performed by: STUDENT IN AN ORGANIZED HEALTH CARE EDUCATION/TRAINING PROGRAM

## 2021-04-25 PROCEDURE — 85025 COMPLETE CBC W/AUTO DIFF WBC: CPT | Performed by: STUDENT IN AN ORGANIZED HEALTH CARE EDUCATION/TRAINING PROGRAM

## 2021-04-25 PROCEDURE — 36415 COLL VENOUS BLD VENIPUNCTURE: CPT | Performed by: STUDENT IN AN ORGANIZED HEALTH CARE EDUCATION/TRAINING PROGRAM

## 2021-04-25 PROCEDURE — 250N000013 HC RX MED GY IP 250 OP 250 PS 637: Performed by: STUDENT IN AN ORGANIZED HEALTH CARE EDUCATION/TRAINING PROGRAM

## 2021-04-25 PROCEDURE — 96375 TX/PRO/DX INJ NEW DRUG ADDON: CPT | Performed by: STUDENT IN AN ORGANIZED HEALTH CARE EDUCATION/TRAINING PROGRAM

## 2021-04-25 PROCEDURE — 83735 ASSAY OF MAGNESIUM: CPT | Performed by: STUDENT IN AN ORGANIZED HEALTH CARE EDUCATION/TRAINING PROGRAM

## 2021-04-25 PROCEDURE — 99284 EMERGENCY DEPT VISIT MOD MDM: CPT | Mod: 25 | Performed by: STUDENT IN AN ORGANIZED HEALTH CARE EDUCATION/TRAINING PROGRAM

## 2021-04-25 PROCEDURE — 250N000011 HC RX IP 250 OP 636: Performed by: STUDENT IN AN ORGANIZED HEALTH CARE EDUCATION/TRAINING PROGRAM

## 2021-04-25 PROCEDURE — 86140 C-REACTIVE PROTEIN: CPT | Performed by: STUDENT IN AN ORGANIZED HEALTH CARE EDUCATION/TRAINING PROGRAM

## 2021-04-25 RX ORDER — KETOROLAC TROMETHAMINE 30 MG/ML
30 INJECTION, SOLUTION INTRAMUSCULAR; INTRAVENOUS ONCE
Status: COMPLETED | OUTPATIENT
Start: 2021-04-25 | End: 2021-04-25

## 2021-04-25 RX ORDER — HYDROMORPHONE HYDROCHLORIDE 1 MG/ML
0.5 INJECTION, SOLUTION INTRAMUSCULAR; INTRAVENOUS; SUBCUTANEOUS ONCE
Status: COMPLETED | OUTPATIENT
Start: 2021-04-25 | End: 2021-04-25

## 2021-04-25 RX ORDER — LORAZEPAM 2 MG/ML
1 INJECTION INTRAMUSCULAR ONCE
Status: COMPLETED | OUTPATIENT
Start: 2021-04-25 | End: 2021-04-25

## 2021-04-25 RX ORDER — ACETAMINOPHEN 325 MG/1
975 TABLET ORAL ONCE
Status: COMPLETED | OUTPATIENT
Start: 2021-04-25 | End: 2021-04-25

## 2021-04-25 RX ORDER — ONDANSETRON 2 MG/ML
4 INJECTION INTRAMUSCULAR; INTRAVENOUS ONCE
Status: COMPLETED | OUTPATIENT
Start: 2021-04-25 | End: 2021-04-25

## 2021-04-25 RX ORDER — CYCLOBENZAPRINE HCL 10 MG
10 TABLET ORAL ONCE
Status: COMPLETED | OUTPATIENT
Start: 2021-04-25 | End: 2021-04-25

## 2021-04-25 RX ADMIN — LORAZEPAM 1 MG: 2 INJECTION, SOLUTION INTRAMUSCULAR; INTRAVENOUS at 11:43

## 2021-04-25 RX ADMIN — CYCLOBENZAPRINE 10 MG: 10 TABLET, FILM COATED ORAL at 09:45

## 2021-04-25 RX ADMIN — HYDROMORPHONE HYDROCHLORIDE 0.5 MG: 1 INJECTION, SOLUTION INTRAMUSCULAR; INTRAVENOUS; SUBCUTANEOUS at 11:38

## 2021-04-25 RX ADMIN — ACETAMINOPHEN 975 MG: 325 TABLET, FILM COATED ORAL at 11:38

## 2021-04-25 RX ADMIN — HYDROMORPHONE HYDROCHLORIDE 0.5 MG: 1 INJECTION, SOLUTION INTRAMUSCULAR; INTRAVENOUS; SUBCUTANEOUS at 09:45

## 2021-04-25 RX ADMIN — HYDROMORPHONE HYDROCHLORIDE 0.5 MG: 1 INJECTION, SOLUTION INTRAMUSCULAR; INTRAVENOUS; SUBCUTANEOUS at 08:59

## 2021-04-25 RX ADMIN — LORAZEPAM 1 MG: 2 INJECTION, SOLUTION INTRAMUSCULAR; INTRAVENOUS at 09:09

## 2021-04-25 RX ADMIN — KETOROLAC TROMETHAMINE 30 MG: 30 INJECTION, SOLUTION INTRAMUSCULAR; INTRAVENOUS at 09:41

## 2021-04-25 RX ADMIN — ONDANSETRON 4 MG: 2 INJECTION INTRAMUSCULAR; INTRAVENOUS at 09:00

## 2021-04-25 ASSESSMENT — MIFFLIN-ST. JEOR: SCORE: 1676.75

## 2021-04-25 NOTE — ED NOTES
MD aware of discharge BP patient encouraged to follow up with primary MD tomorrow regarding pain and bp. Crutches fitted and provided. Pt  verbalizes understanding

## 2021-04-25 NOTE — DISCHARGE INSTRUCTIONS
Please take NSAIDs (always taken with food to avoid ulcers) and tylenol alternated every 3 hours. You can take up to 2400 mg nsaids (e.g. ibuprofen) and 4000 mg tylenol over a 24 hr period. Call Dr. Ocasio office first thing tomorrow morning. Please have blood pressure rechecked tomorrow in clinic as elevated here today.

## 2021-04-25 NOTE — ED PROVIDER NOTES
History     Chief Complaint   Patient presents with     Hip Pain       Augie De Jesus is a 60 year old male who presents via EMS with left upper leg pain.  Patient states last night his pain became severe 11/10.  Pain is throbbing and worsened with weightbearing.  Radiates to his knee and left groin and lateral hip.  Pain consistent with ongoing pain he has been experiencing in his upper leg for which he has seen sports medicine with knee x-ray and recent ED visit with concern for diverticulitis with CT scan showing diverticulosis.  Patient states he is hoping to get a steroid injection as he feels this is bursitis of worsening.  He did have a fall last night but states pain started before the fall.  Last alcoholic drink was last evening.  Symptoms include some shortness of breath, but denies fever, chills, chest pain, nausea, vomiting, rash, genital pain, erythema.    No Known Allergies    Patient Active Problem List    Diagnosis Date Noted     Microcytic anemia 03/19/2020     Priority: Medium     Alcohol dependence in remission (H) 02/22/2018     Priority: Medium     CMC arthritis 10/24/2016     Priority: Medium     Primary osteoarthritis of both first carpometacarpal joints 10/20/2016     Priority: Medium     YARELIS (obstructive sleep apnea) 06/23/2015     Priority: Medium     S/P total knee arthroplasty 06/23/2015     Priority: Medium     External hemorrhoids 05/04/2011     Priority: Medium     Gastroesophageal reflux disease 10/15/2010     Priority: Medium     Hypertension 05/12/2010     Priority: Medium     Depression, major 03/24/2010     Priority: Medium       Past Medical History:   Diagnosis Date     Alcohol-induced acute pancreatitis without infection or necrosis      Chondromalacia of knee      Essential (primary) hypertension      Gastro-esophageal reflux disease without esophagitis      Major depressive disorder, single episode      Obstructive sleep apnea      Other specified cardiac arrhythmias  "(CODE)      Other tear of medial meniscus, current injury, unspecified knee, initial encounter      Personal history of other medical treatment (CODE)      Primary osteoarthritis of both first carpometacarpal joints      Primary osteoarthritis of hand      Primary osteoarthritis of one knee      Residual hemorrhoidal skin tags      Sciatica      Uncomplicated alcohol dependence (H)        Past Surgical History:   Procedure Laterality Date     ARTHROPLASTY ANKLE Right 2017     ARTHROPLASTY KNEE Left     6/25/15,Knee Replacement, Total Dr. Hernandez     COLONOSCOPY  03/16/2018    Normal follow up 2028     COLONOSCOPY N/A 3/16/2018    Procedure: COLONOSCOPY;  Colonoscopy;  Surgeon: Jimmy Phelps MD;  Location:  OR       Family History   Problem Relation Age of Onset     Heart Disease Mother         Heart Disease, heart failure     Substance Abuse Father         Alcohol/Drug     Cancer Sister         Cancer,uterine cancer       Social History     Tobacco Use     Smoking status: Never Smoker     Smokeless tobacco: Never Used   Substance Use Topics     Alcohol use: Yes     Frequency: 2-4 times a month     Drinks per session: 10 or more     Binge frequency: Weekly     Comment: Hard liquor     Drug use: Not Currently     Types: Other     Comment: Drug use: No       Medications:    acamprosate (CAMPRAL) 333 MG EC tablet  acetaminophen (TYLENOL) 500 MG tablet  buPROPion (WELLBUTRIN XL) 300 MG 24 hr tablet  diclofenac (VOLTAREN) 75 MG EC tablet  DULoxetine (CYMBALTA) 60 MG capsule  naltrexone (DEPADE/REVIA) 50 MG tablet  omeprazole 20 MG tablet  polyethylene glycol (MIRALAX) 17 GM/Dose powder        Review of Systems: See HPI for pertinent negatives and positives.    Physical Exam   BP (!) 185/98   Pulse 113   Temp 98.4  F (36.9  C) (Tympanic)   Resp 18   Ht 1.702 m (5' 7\")   Wt 89.8 kg (198 lb)   SpO2 95%   BMI 31.01 kg/m       General: awake, uncomfortable  HEENT: atraumatic, neck supple  Respiratory: normal effort, " tachypneic, clear to auscultation bilaterally  Cardiovascular: tachycardic, regular rhythm, no murmurs  Extremities: no deformities, edema; anterior thigh tenderness, no knee instability or tenderness, hip flexion/extension 5/5  Skin: warm, dry, no rashes, no erythema  Neuro: alert, tremor  Psych: anxious    ED Course      ED Course as of Apr 25 1810   Sun Apr 25, 2021   0938 No improvement in pain still appearing very uncomfortable. Trying toradol and muscle relaxant and another 0.5 mg dilaudid.      1118 Still no significant improvement.      1205 Feeling mild improvement. Requesting crutches or cane if possible as he feels this might be helpful.          Results for orders placed or performed during the hospital encounter of 04/25/21 (from the past 24 hour(s))   CBC with platelets differential   Result Value Ref Range    WBC 9.8 4.0 - 11.0 10e9/L    RBC Count 5.26 4.4 - 5.9 10e12/L    Hemoglobin 10.4 (L) 13.3 - 17.7 g/dL    Hematocrit 34.4 (L) 40.0 - 53.0 %    MCV 65 (L) 78 - 100 fl    MCH 19.8 (L) 26.5 - 33.0 pg    MCHC 30.2 (L) 31.5 - 36.5 g/dL    RDW 21.6 (H) 10.0 - 15.0 %    Platelet Count 365 150 - 450 10e9/L    Diff Method Automated Method     % Neutrophils 76.1 %    % Lymphocytes 11.0 %    % Monocytes 11.3 %    % Eosinophils 0.4 %    % Basophils 0.7 %    % Immature Granulocytes 0.5 %    Absolute Neutrophil 7.4 1.6 - 8.3 10e9/L    Absolute Lymphocytes 1.1 0.8 - 5.3 10e9/L    Absolute Monocytes 1.1 0.0 - 1.3 10e9/L    Absolute Eosinophils 0.0 0.0 - 0.7 10e9/L    Absolute Basophils 0.1 0.0 - 0.2 10e9/L    Abs Immature Granulocytes 0.1 0 - 0.4 10e9/L   CRP inflammation   Result Value Ref Range    CRP Inflammation 20.9 (H) <10.0 mg/L   Magnesium   Result Value Ref Range    Magnesium 1.9 1.9 - 2.7 mg/dL       Medications   HYDROmorphone (PF) (DILAUDID) injection 0.5 mg (0.5 mg Intravenous Handoff 4/25/21 0903)   ondansetron (ZOFRAN) injection 4 mg (4 mg Intravenous Given 4/25/21 0900)   LORazepam (ATIVAN)  injection 1 mg (1 mg Intravenous Given 4/25/21 0909)   ketorolac (TORADOL) injection 30 mg (30 mg Intravenous Given 4/25/21 0941)   cyclobenzaprine (FLEXERIL) tablet 10 mg (10 mg Oral Given 4/25/21 0945)   HYDROmorphone (PF) (DILAUDID) injection 0.5 mg (0.5 mg Intravenous Given 4/25/21 0945)   acetaminophen (TYLENOL) tablet 975 mg (975 mg Oral Given 4/25/21 1138)   HYDROmorphone (PF) (DILAUDID) injection 0.5 mg (0.5 mg Intravenous Given 4/25/21 1138)   LORazepam (ATIVAN) injection 1 mg (1 mg Intravenous Given 4/25/21 1143)       Assessments & Plan (with Medical Decision Making)     I have reviewed the nursing notes.    Patient evaluated for left anterior thigh pain.  Recent reassuring left knee and hip x-rays and CT abdomen pelvis.  Pain is consistent with prior leg pain that is being managed by sports medicine.  Patient in significant pain with tachycardia and hypertension and tremor in context of recent alcohol use and subsequent cessation concerning for coexisting withdrawal.  Patient feels pain is bursitis.  Labs checked including CBC (no leukocytosis) and CRP with septic bursitis on differential.  CRP mildly elevated likely secondary to some withdrawal.  Pain difficult to control but did have some improvement and was discharged home with crutches with recommendation to use NSAIDs and Tylenol and call for sports medicine/PCP follow-up first thing tomorrow morning as he is very interested in a steroid injection.  His blood pressure was quite elevated (SBP of the 180s) and can be rechecked at this time as well.  Patient ambulated successfully prior to discharge. Patient given follow-up / ED return precautions. All questions answered and patient comfortable with plan for discharge. Discharged home in stable condition.    I have reviewed the findings, diagnosis, plan and need for any follow up with the patient.    Discharge Medication List as of 4/25/2021 12:55 PM          Final diagnoses:   Pain of left thigh    Essential hypertension       4/25/2021   [facility]     Rosalio Muñoz MD  04/25/21 5054

## 2021-04-25 NOTE — ED TRIAGE NOTES
EMS Arrival Note  ________________________________  Augie De Jesus is a 60 year old Male that arrives via [facility]  Pre hospital clinical presentation per patient  includes parient called ambulance left hip pain that was intensely increasing starting yesterday. Has hx of brusitis in both hips.  Pre hospital care included: Medication: Fentanyl:,  Patient arrives with:  GCS Total = 15  Airway intact  Breathing Assessment Normal.    Circulation Assessment Normal.  Patient arrives with a 20 gauge,IV at his his right hand    Placed in room 909, gowned, warm blanket provided, side rails up,  ID verified and band placed, and call light within reach.       Previous living situation Alone at apartment

## 2021-04-25 NOTE — PROGRESS NOTES
Patient requesting to get up and go to the bathroom.  States he has to poop and pee.  He states he felt he could do with a walker.  Patient up with walker and 2 to transfer.  Patient very shaky on feet.      Unable to go to bathroom.

## 2021-04-27 ENCOUNTER — OFFICE VISIT (OUTPATIENT)
Dept: FAMILY MEDICINE | Facility: OTHER | Age: 61
End: 2021-04-27
Attending: FAMILY MEDICINE
Payer: COMMERCIAL

## 2021-04-27 VITALS
DIASTOLIC BLOOD PRESSURE: 96 MMHG | OXYGEN SATURATION: 98 % | TEMPERATURE: 98.4 F | RESPIRATION RATE: 20 BRPM | HEART RATE: 124 BPM | SYSTOLIC BLOOD PRESSURE: 160 MMHG

## 2021-04-27 DIAGNOSIS — R76.8 POSITIVE ANA (ANTINUCLEAR ANTIBODY): ICD-10-CM

## 2021-04-27 DIAGNOSIS — M79.10 MYALGIA: Primary | ICD-10-CM

## 2021-04-27 LAB
ALBUMIN SERPL-MCNC: 4.6 G/DL (ref 3.5–5.7)
ALP SERPL-CCNC: 61 U/L (ref 34–104)
ALT SERPL W P-5'-P-CCNC: 19 U/L (ref 7–52)
ANION GAP SERPL CALCULATED.3IONS-SCNC: 12 MMOL/L (ref 3–14)
AST SERPL W P-5'-P-CCNC: 26 U/L (ref 13–39)
BILIRUB SERPL-MCNC: 0.5 MG/DL (ref 0.3–1)
BUN SERPL-MCNC: 18 MG/DL (ref 7–25)
CALCIUM SERPL-MCNC: 9.7 MG/DL (ref 8.6–10.3)
CHLORIDE SERPL-SCNC: 93 MMOL/L (ref 98–107)
CK SERPL-CCNC: 176 U/L (ref 30–223)
CO2 SERPL-SCNC: 23 MMOL/L (ref 21–31)
CREAT SERPL-MCNC: 1.3 MG/DL (ref 0.7–1.3)
CRP SERPL-MCNC: 7.3 MG/L
GFR SERPL CREATININE-BSD FRML MDRD: 57 ML/MIN/{1.73_M2}
GLUCOSE SERPL-MCNC: 111 MG/DL (ref 70–105)
POTASSIUM SERPL-SCNC: 4 MMOL/L (ref 3.5–5.1)
PROT SERPL-MCNC: 8.3 G/DL (ref 6.4–8.9)
SODIUM SERPL-SCNC: 128 MMOL/L (ref 134–144)

## 2021-04-27 PROCEDURE — 86038 ANTINUCLEAR ANTIBODIES: CPT | Mod: ZL | Performed by: FAMILY MEDICINE

## 2021-04-27 PROCEDURE — 99213 OFFICE O/P EST LOW 20 MIN: CPT | Performed by: FAMILY MEDICINE

## 2021-04-27 PROCEDURE — 86140 C-REACTIVE PROTEIN: CPT | Mod: ZL | Performed by: FAMILY MEDICINE

## 2021-04-27 PROCEDURE — 86039 ANTINUCLEAR ANTIBODIES (ANA): CPT | Mod: ZL | Performed by: FAMILY MEDICINE

## 2021-04-27 PROCEDURE — 80053 COMPREHEN METABOLIC PANEL: CPT | Mod: ZL | Performed by: FAMILY MEDICINE

## 2021-04-27 PROCEDURE — G0463 HOSPITAL OUTPT CLINIC VISIT: HCPCS

## 2021-04-27 PROCEDURE — 36415 COLL VENOUS BLD VENIPUNCTURE: CPT | Mod: ZL | Performed by: FAMILY MEDICINE

## 2021-04-27 PROCEDURE — 82550 ASSAY OF CK (CPK): CPT | Mod: ZL | Performed by: FAMILY MEDICINE

## 2021-04-27 RX ORDER — OXYCODONE HYDROCHLORIDE 5 MG/1
5 TABLET ORAL EVERY 6 HOURS PRN
Qty: 12 TABLET | Refills: 0 | Status: SHIPPED | OUTPATIENT
Start: 2021-04-27 | End: 2021-04-30

## 2021-04-27 RX ORDER — PREDNISONE 20 MG/1
40 TABLET ORAL DAILY
Qty: 10 TABLET | Refills: 0 | Status: SHIPPED | OUTPATIENT
Start: 2021-04-27 | End: 2021-05-02

## 2021-04-27 RX ORDER — HYDROCODONE BITARTRATE AND ACETAMINOPHEN 5; 325 MG/1; MG/1
1 TABLET ORAL EVERY 6 HOURS PRN
Qty: 10 TABLET | Refills: 0 | Status: SHIPPED | OUTPATIENT
Start: 2021-04-27 | End: 2021-04-27

## 2021-04-27 ASSESSMENT — PAIN SCALES - GENERAL: PAINLEVEL: WORST PAIN (10)

## 2021-04-27 NOTE — PATIENT INSTRUCTIONS
Start campral 2 tabs 3 times a day.    Hold diclofenac.    Start prednisone 40mg daily for 5 days with food.    Restart diclofenac once prednisone is completed.    Use hydrocodone 1 tab every 6 hours as needed for pain.

## 2021-04-27 NOTE — PROGRESS NOTES
SUBJECTIVE:  Augie De Jesus is a 60 year old male here for left upper leg pain.  This started on the night of April 24.  It got bad enough where he went to the ER on April 25.  His pain starts in his left lateral thigh and radiates to his left distal thigh, anterior, lateral and posterior.  Seem to be worse when he is putting pressure on his leg so he has been using a cane or crutch.  He has a history of bursitis and was wondering if a steroid injection might be helpful.  He reports that he did fall however his pain started before the fall.    He has a history of alcoholism and was prescribed Campral at his last visit but he reports that he never started that as he was not able to get his group home to start getting it out without acknowledgment from us.    Pelvis x-rays in December 2020 were unremarkable.    ROS:    As above otherwise ROS is unremarkable.    OBJECTIVE:  BP (!) 160/96   Pulse 124   Temp 98.4  F (36.9  C)   Resp 20   SpO2 98%     EXAM:  General Appearance: Appears uncomfortable.  He is tearful throughout the exam.    Musculoskeletal: Palpation diffusely along his left thigh especially the lateral aspect, mid to distal anterior aspect and distal posterior aspect.  Skin: No rash.  Neurologic Exam: No focal sensory deficits.    ASSESSEMENT AND PLAN:    1. Myalgia      Imaging was unremarkable earlier and he has had no falls that preceded this so imaging was not repeated today.  He did have elevated CRP in the emergency department.  He is not on a statin.  Discussed that I do not think this is simple trochanteric bursitis given the widespread nature of his palpable tenderness.  I am worried about the possibility of myopathy so we will check total CK, MARKO and complete metabolic panel as well as repeating CRP.  Will use prednisone 40 mg daily for 5 days to try to decrease inflammation.  We will also use oxycodone as needed for breakthrough pain as he has not tolerated hydrocodone in the past.    If he  is having no improvement in his labs are not helpful could consider neurologic causes versus arterial vasculopathy.    Berto Ocasio MD  Family Medicine

## 2021-04-27 NOTE — NURSING NOTE
Patient presents today for left hip and leg pain. He rates his pain 10/10.    Medication Reconciliation Complete    Yudelka Jj LPN  4/27/2021 1:44 PM

## 2021-04-28 ENCOUNTER — MYC MEDICAL ADVICE (OUTPATIENT)
Dept: FAMILY MEDICINE | Facility: OTHER | Age: 61
End: 2021-04-28

## 2021-04-28 DIAGNOSIS — K59.00 CONSTIPATION, UNSPECIFIED CONSTIPATION TYPE: Primary | ICD-10-CM

## 2021-04-29 ENCOUNTER — PATIENT OUTREACH (OUTPATIENT)
Dept: FAMILY MEDICINE | Facility: OTHER | Age: 61
End: 2021-04-29

## 2021-04-29 ENCOUNTER — MYC MEDICAL ADVICE (OUTPATIENT)
Dept: FAMILY MEDICINE | Facility: OTHER | Age: 61
End: 2021-04-29

## 2021-04-29 ENCOUNTER — MEDICAL CORRESPONDENCE (OUTPATIENT)
Dept: HEALTH INFORMATION MANAGEMENT | Facility: CLINIC | Age: 61
End: 2021-04-29

## 2021-04-29 LAB
ANA PAT SER IF-IMP: ABNORMAL
ANA SER QL IF: POSITIVE
ANA TITR SER IF: ABNORMAL {TITER}

## 2021-04-29 NOTE — TELEPHONE ENCOUNTER
Patient Quality Outreach      Summary:    Patient has the following on his problem list/HM:   Immunizations     No immunizations due        Patient is due/failing the following:   Immunizations    Type of outreach:    Sent [employer] message.    Questions for provider review:    None                                                                                                                                     Tara Flowers PA-C  4/29/2021  11:53 AM     Chart routed to N/A.

## 2021-05-02 ENCOUNTER — MYC MEDICAL ADVICE (OUTPATIENT)
Dept: FAMILY MEDICINE | Facility: OTHER | Age: 61
End: 2021-05-02

## 2021-05-04 ENCOUNTER — TELEPHONE (OUTPATIENT)
Dept: FAMILY MEDICINE | Facility: OTHER | Age: 61
End: 2021-05-04

## 2021-05-04 DIAGNOSIS — M79.10 MYALGIA: Primary | ICD-10-CM

## 2021-05-04 RX ORDER — NAPROXEN 500 MG/1
500 TABLET ORAL 2 TIMES DAILY WITH MEALS
Qty: 60 TABLET | Refills: 3 | Status: SHIPPED | OUTPATIENT
Start: 2021-05-04

## 2021-05-04 NOTE — TELEPHONE ENCOUNTER
Should not use prednisone long-term.  We can try a different anti-inflammatory if he does not feel the Voltaren is helping.

## 2021-05-04 NOTE — TELEPHONE ENCOUNTER
"Patient states,\"the Voltaren is not helping his pain at all\".    Yudelka Jj, LPN on 5/4/2021 at 2:44 PM      "

## 2021-05-04 NOTE — TELEPHONE ENCOUNTER
Patient was notified he will be getting a call to schedule with rheumatology but complains his pain has worsened since finishing the prednisone and wanted to see about a refill.    Yudelka Jj LPN on 5/4/2021 at 11:31 AM

## 2021-05-04 NOTE — TELEPHONE ENCOUNTER
Please call the patient back.  He has questions about medication and an R.A.  Appointment.      Kenzie Jim on 5/4/2021 at 11:09 AM

## 2021-05-04 NOTE — TELEPHONE ENCOUNTER
We will send in naproxen twice daily with meals.  He should take this regularly for the next 7 to 10 days.  He should stop diclofenac at this time.

## 2021-05-05 ENCOUNTER — NURSE TRIAGE (OUTPATIENT)
Dept: FAMILY MEDICINE | Facility: OTHER | Age: 61
End: 2021-05-05

## 2021-05-05 ENCOUNTER — TELEPHONE (OUTPATIENT)
Dept: FAMILY MEDICINE | Facility: OTHER | Age: 61
End: 2021-05-05

## 2021-05-05 NOTE — TELEPHONE ENCOUNTER
Called patient and patient states as per phone message yesterday 5/4/2021.  Patient having severe leg pain as he was having before and the prednisone seemed to work and was looking for a refill on Prednisone.  Phone note from yesterday reviewed with patient and patient verbalized understanding.    Patient will go  Rx and will call if pain continues.    Ashely Chu RN on 5/5/2021 at 11:57 AM

## 2021-05-05 NOTE — TELEPHONE ENCOUNTER
I received a phone call from Sarah at [facility]. She was wondering if the patient could be seen at [facility] as they are a preferred provider rather than the [facility] for Rheumatology. Please advise and call back.     Thanks!

## 2021-05-05 NOTE — TELEPHONE ENCOUNTER
Pt is experiencing extreme pain in upper leg. He would like to get more prednisone if possible, it was the only thing that worked.  Please call    Rom Hall on 5/5/2021 at 11:48 AM

## 2021-05-06 ENCOUNTER — MYC MEDICAL ADVICE (OUTPATIENT)
Dept: FAMILY MEDICINE | Facility: OTHER | Age: 61
End: 2021-05-06

## 2021-05-06 DIAGNOSIS — M79.10 MYALGIA: Primary | ICD-10-CM

## 2021-05-06 RX ORDER — PREDNISONE 10 MG/1
TABLET ORAL
Qty: 70 TABLET | Refills: 0 | Status: SHIPPED | OUTPATIENT
Start: 2021-05-06 | End: 2021-06-03

## 2021-05-06 NOTE — TELEPHONE ENCOUNTER
Notified Sarah and she will let patient know that he does not need a referral to [facility].   Norma J. Gosselin, LPN .......  5/6/2021  8:41 AM

## 2021-05-07 NOTE — TELEPHONE ENCOUNTER
Closing encounter this was addressed in previous encounter.    Yudelka Jj LPN on 5/7/2021 at 8:43 AM

## 2021-05-10 ENCOUNTER — TELEPHONE (OUTPATIENT)
Dept: FAMILY MEDICINE | Facility: OTHER | Age: 61
End: 2021-05-10

## 2021-05-10 NOTE — TELEPHONE ENCOUNTER
Please send the RX for Prednisone to [facility]  The free standing location.      Knezie Jim on 5/10/2021 at 9:13 AM

## 2021-05-10 NOTE — TELEPHONE ENCOUNTER
Patient needs his predniSONE (DELTASONE) 10 MG tablet called into [facility] by Ken.  Please call patient back.    Thank you Ailyn Howard on 5/10/2021 at 7:41 AM

## 2021-05-10 NOTE — TELEPHONE ENCOUNTER
Patient notified that this was sent in 5/6/21 to [facility].  Norma J. Gosselin, LPN .......  5/10/2021  7:50 AM

## 2021-05-11 ENCOUNTER — MYC MEDICAL ADVICE (OUTPATIENT)
Dept: FAMILY MEDICINE | Facility: OTHER | Age: 61
End: 2021-05-11

## 2021-05-13 NOTE — TELEPHONE ENCOUNTER
[facility] apparently doesn't need a referral for rheumatology. I called patient to let him know to reach out to [facility] to make an appointment.   Funmi Pinto LPN.......  5/13/2021  9:56 AM

## 2021-05-13 NOTE — TELEPHONE ENCOUNTER
Let patient know that he doesn't need referral for [facility] for rheumatology.   Funmi Pinto LPN.......  5/13/2021  9:54 AM

## 2021-05-20 ENCOUNTER — TELEPHONE (OUTPATIENT)
Dept: FAMILY MEDICINE | Facility: OTHER | Age: 61
End: 2021-05-20

## 2021-05-20 DIAGNOSIS — M18.0 PRIMARY OSTEOARTHRITIS OF BOTH FIRST CARPOMETACARPAL JOINTS: Primary | ICD-10-CM

## 2021-05-20 NOTE — TELEPHONE ENCOUNTER
DWS     Patient needs a referral to see a rheumatologist at [facility] in [place]. Please call patient if you have any questions, as he does not have access to [facility] right now.     Thanks!   NAMITA DHILLON on 5/20/2021 at 11:43 AM

## 2021-05-21 NOTE — TELEPHONE ENCOUNTER
Per phone note on May 5 he does not need a referral to [facility] he just simply needs to call and set up his appointment.

## 2021-05-21 NOTE — TELEPHONE ENCOUNTER
Notified patient via voice message of providers note.  Sherie Garcia LPN ....................  5/21/2021   8:52 AM

## 2021-05-24 ENCOUNTER — TELEPHONE (OUTPATIENT)
Dept: FAMILY MEDICINE | Facility: OTHER | Age: 61
End: 2021-05-24

## 2021-05-24 ENCOUNTER — MEDICAL CORRESPONDENCE (OUTPATIENT)
Dept: HEALTH INFORMATION MANAGEMENT | Facility: CLINIC | Age: 61
End: 2021-05-24

## 2021-05-24 DIAGNOSIS — R76.8 POSITIVE ANA (ANTINUCLEAR ANTIBODY): Primary | ICD-10-CM

## 2021-05-24 NOTE — TELEPHONE ENCOUNTER
Patient left VM on unit 1 stating that [facility] states he does need a referral for Rheumatology. Please place new referral.    Myriam Antonio on 5/24/2021 at 10:25 AM

## 2021-05-24 NOTE — TELEPHONE ENCOUNTER
Called [facility] to see if they have received either referral sent over, left message to call back.  Funmi Pinto LPN.......  5/24/2021  2:10 PM

## 2021-05-24 NOTE — TELEPHONE ENCOUNTER
New referral again placed for rheumatology despite also being entered on April 27.  Patient should follow-up with [facility] to set this up.

## 2021-05-25 ENCOUNTER — TELEPHONE (OUTPATIENT)
Dept: FAMILY MEDICINE | Facility: OTHER | Age: 61
End: 2021-05-25

## 2021-05-25 NOTE — TELEPHONE ENCOUNTER
Pt called back, states he was supposed to get a call back today but no one ever called him. Pt was upset and not wanting to talk to a nurse, just wanted to leave a note saying no one called him.

## 2021-05-25 NOTE — TELEPHONE ENCOUNTER
Left message for patient to call back. Referral was sent to [place]. If he would like referral to go to [facility], Dr. Ocasio will need to send in a new one.    Yudelka Jj LPN on 5/25/2021 at 3:45 PM

## 2021-05-26 ENCOUNTER — TELEPHONE (OUTPATIENT)
Dept: FAMILY MEDICINE | Facility: OTHER | Age: 61
End: 2021-05-26

## 2021-05-26 NOTE — TELEPHONE ENCOUNTER
Left message that referral was changed to [facility] per patient.  Sherie Garcia LPN ....................  5/26/2021   11:33 AM

## 2021-05-26 NOTE — TELEPHONE ENCOUNTER
Patient called [facility] needs referral to [facility]. He states she is frustrated because he has called and no one returns his calls, his leg pain is coming back and he cannot walk if he is not on prednisone and he is on a taper.     Past referral was closed by provider and most recent [facility].     Please place referral for [facility].     Myriam Antonio on 5/26/2021 at 10:24 AM

## 2021-05-26 NOTE — TELEPHONE ENCOUNTER
Patient would like to go to [facility] as he doesn't have transportation. He also is tapering off his prednisone and is having pain. Could he continue on prednisone? He is aware that S is out of office till Friday and is willing to wait.   Sherie Garcia LPN ....................  5/26/2021   10:55 AM

## 2021-05-26 NOTE — TELEPHONE ENCOUNTER
Patient called and left message on unit 1 VM stating calling back nurse Sherie, He would like the referral to be .    We do need a new referral placed for patient for [facility] as the most current one had [facility] listed as location, we cannot send that to [facility].     Please place new referral for [facility] and we can get it sent out.    Thank you,  Myriam Antonio on 5/26/2021 at 11:19 AM

## 2021-05-26 NOTE — TELEPHONE ENCOUNTER
Patient called back and would like referral to [facility] in [place] now.  Sherie Garcia LPN ....................  5/26/2021   11:28 AM

## 2021-05-26 NOTE — TELEPHONE ENCOUNTER
Spoke with patient and discussed that multiple message have been left for patient to call back, Concerns and referral were addressed in other phone note.  Sherie Garcia LPN ....................  5/26/2021   11:00 AM

## 2021-05-27 NOTE — TELEPHONE ENCOUNTER
A 3rd referral was placed despite [facility] stating after the first referral that a referral was not needed.

## 2021-05-28 ENCOUNTER — HOSPITAL ENCOUNTER (OUTPATIENT)
Facility: OTHER | Age: 61
End: 2021-05-28
Attending: SURGERY | Admitting: SURGERY
Payer: COMMERCIAL

## 2021-05-28 DIAGNOSIS — Z12.11 ENCOUNTER FOR SCREENING COLONOSCOPY: Primary | ICD-10-CM

## 2021-05-28 RX ORDER — POLYETHYLENE GLYCOL 3350 17 G/17G
POWDER, FOR SOLUTION ORAL
Qty: 510 G | Refills: 0 | Status: SHIPPED | OUTPATIENT
Start: 2021-05-28

## 2021-05-28 RX ORDER — BISACODYL 5 MG/1
TABLET, DELAYED RELEASE ORAL
Qty: 2 TABLET | Refills: 0 | Status: SHIPPED | OUTPATIENT
Start: 2021-05-28

## 2021-05-28 NOTE — TELEPHONE ENCOUNTER
Screening Questions for the Scheduling of Screening Colonoscopies   (If Colonoscopy is diagnostic, Provider should review the chart before scheduling.)  Are you younger than 50 or older than 80?   NO   Do you take aspirin or fish oil?  NO  (if yes, tell patient to stop 1 week prior to Colonoscopy)  Do you take warfarin (Coumadin), clopidogrel (Plavix), apixaban (Eliquis), dabigatram (Pradaxa), rivaroxaban (Xarelto) or any blood thinner? NO   Do you use oxygen at home?  NO   Do you have kidney disease? NO   Are you on dialysis? NO   Have you had a stroke or heart attack in the last year? NO   Have you had a stent in your heart or any blood vessel in the last year? NO   Have you had a transplant of any organ? NO   Have you had a colonoscopy or upper endoscopy (EGD) before? YES          When?  3 YRS AGO ?  Date of scheduled Colonoscopy/ EGD  07/29/2021  Provider [facility]   Pharm[facility]

## 2021-06-03 ENCOUNTER — TELEPHONE (OUTPATIENT)
Dept: FAMILY MEDICINE | Facility: OTHER | Age: 61
End: 2021-06-03

## 2021-06-03 NOTE — TELEPHONE ENCOUNTER
Patient called stating he is on his last week of prednisone and not sure what he should do. He is calling [facility] to see when he is able to get in to see them, but needs to know what he should do for the pain until then since he is on a taper with that prednisone.     Please contact patient [phone], was notified DWS was out of clinic today but would like a nurse to call to discuss further until tomorrow when DWS returns to work.    Myriam Antonio on 6/3/2021 at 11:07 AM

## 2021-06-03 NOTE — TELEPHONE ENCOUNTER
Attempted to call, it states unable to complete call at this time.  Sherie Garcia LPN ....................  6/3/2021   1:29 PM

## 2021-06-04 ENCOUNTER — MYC MEDICAL ADVICE (OUTPATIENT)
Dept: FAMILY MEDICINE | Facility: OTHER | Age: 61
End: 2021-06-04

## 2021-06-04 NOTE — TELEPHONE ENCOUNTER
He should continue the slow taper as prescribed.  As far as the paperwork that continues to be sent here, it clearly states that he needs to be filled out by mental health provider and not a medical doctor which is why I have not completed it.

## 2021-06-10 ENCOUNTER — TELEPHONE (OUTPATIENT)
Dept: FAMILY MEDICINE | Facility: OTHER | Age: 61
End: 2021-06-10

## 2021-06-10 NOTE — TELEPHONE ENCOUNTER
Patient has some questions regarding additional lab work that referred provider may be requesting.      Please call to advise    Thank you

## 2021-06-14 NOTE — TELEPHONE ENCOUNTER
Attempted call and patients phone number did not work. I will try again.    Yudelka Jj LPN on 6/14/2021 at 2:29 PM

## 2021-06-15 ENCOUNTER — APPOINTMENT (OUTPATIENT)
Dept: CT IMAGING | Facility: OTHER | Age: 61
End: 2021-06-15
Attending: PHYSICIAN ASSISTANT
Payer: COMMERCIAL

## 2021-06-15 ENCOUNTER — APPOINTMENT (OUTPATIENT)
Dept: GENERAL RADIOLOGY | Facility: OTHER | Age: 61
End: 2021-06-15
Attending: PHYSICIAN ASSISTANT
Payer: COMMERCIAL

## 2021-06-15 ENCOUNTER — HOSPITAL ENCOUNTER (INPATIENT)
Facility: OTHER | Age: 61
LOS: 4 days | Discharge: ANOTHER HOSPITAL | End: 2021-06-19
Attending: PHYSICIAN ASSISTANT | Admitting: INTERNAL MEDICINE
Payer: COMMERCIAL

## 2021-06-15 ENCOUNTER — APPOINTMENT (OUTPATIENT)
Dept: CARDIOLOGY | Facility: OTHER | Age: 61
End: 2021-06-15
Attending: PHYSICIAN ASSISTANT
Payer: COMMERCIAL

## 2021-06-15 DIAGNOSIS — R06.09 DOE (DYSPNEA ON EXERTION): ICD-10-CM

## 2021-06-15 DIAGNOSIS — I10 ESSENTIAL HYPERTENSION, MALIGNANT: ICD-10-CM

## 2021-06-15 DIAGNOSIS — R79.89 ELEVATED TROPONIN LEVEL: ICD-10-CM

## 2021-06-15 DIAGNOSIS — Z79.899 ENCOUNTER FOR LONG-TERM (CURRENT) USE OF MEDICATIONS: ICD-10-CM

## 2021-06-15 DIAGNOSIS — R79.89 ELEVATED TROPONIN: ICD-10-CM

## 2021-06-15 DIAGNOSIS — R09.02 HYPOXIA: ICD-10-CM

## 2021-06-15 DIAGNOSIS — R06.09 OTHER FORM OF DYSPNEA: ICD-10-CM

## 2021-06-15 DIAGNOSIS — R09.02 HYPOXEMIA: ICD-10-CM

## 2021-06-15 DIAGNOSIS — Z11.52 ENCOUNTER FOR SCREENING LABORATORY TESTING FOR COVID-19 VIRUS: ICD-10-CM

## 2021-06-15 DIAGNOSIS — J96.01 ACUTE RESPIRATORY FAILURE WITH HYPOXIA (H): Primary | ICD-10-CM

## 2021-06-15 DIAGNOSIS — J15.9 COMMUNITY ACQUIRED BACTERIAL PNEUMONIA: ICD-10-CM

## 2021-06-15 PROBLEM — J96.90 RESPIRATORY FAILURE (H): Status: ACTIVE | Noted: 2021-06-15

## 2021-06-15 PROBLEM — F10.10 ALCOHOL ABUSE: Status: ACTIVE | Noted: 2021-06-15

## 2021-06-15 LAB
ALBUMIN SERPL-MCNC: 3.3 G/DL (ref 3.5–5.7)
ALBUMIN UR-MCNC: NEGATIVE MG/DL
ALP SERPL-CCNC: 135 U/L (ref 34–104)
ALT SERPL W P-5'-P-CCNC: 38 U/L (ref 7–52)
ANION GAP SERPL CALCULATED.3IONS-SCNC: 15 MMOL/L (ref 3–14)
APPEARANCE UR: CLEAR
AST SERPL W P-5'-P-CCNC: 58 U/L (ref 13–39)
BASOPHILS # BLD AUTO: 0 10E9/L (ref 0–0.2)
BASOPHILS NFR BLD AUTO: 0.4 %
BILIRUB SERPL-MCNC: 0.9 MG/DL (ref 0.3–1)
BILIRUB UR QL STRIP: NEGATIVE
BUN SERPL-MCNC: 12 MG/DL (ref 7–25)
CALCIUM SERPL-MCNC: 8.4 MG/DL (ref 8.6–10.3)
CHLORIDE SERPL-SCNC: 98 MMOL/L (ref 98–107)
CO2 SERPL-SCNC: 20 MMOL/L (ref 21–31)
COLOR UR AUTO: ABNORMAL
CREAT SERPL-MCNC: 0.77 MG/DL (ref 0.7–1.3)
CRP SERPL-MCNC: 317.9 MG/L
D DIMER PPP FEU-MCNC: 2.4 UG/ML FEU (ref 0–0.5)
DIFFERENTIAL METHOD BLD: ABNORMAL
EOSINOPHIL # BLD AUTO: 0.1 10E9/L (ref 0–0.7)
EOSINOPHIL NFR BLD AUTO: 0.9 %
ERYTHROCYTE [DISTWIDTH] IN BLOOD BY AUTOMATED COUNT: 22.1 % (ref 10–15)
ERYTHROCYTE [SEDIMENTATION RATE] IN BLOOD BY WESTERGREN METHOD: 109 MM/H (ref 1–10)
GFR SERPL CREATININE-BSD FRML MDRD: >90 ML/MIN/{1.73_M2}
GLUCOSE SERPL-MCNC: 109 MG/DL (ref 70–105)
GLUCOSE UR STRIP-MCNC: NEGATIVE MG/DL
HCO3 BLD-SCNC: 25 MMOL/L (ref 21–28)
HCT VFR BLD AUTO: 26.9 % (ref 40–53)
HGB BLD-MCNC: 8.3 G/DL (ref 13.3–17.7)
HGB UR QL STRIP: NEGATIVE
IMM GRANULOCYTES # BLD: 0.1 10E9/L (ref 0–0.4)
IMM GRANULOCYTES NFR BLD: 0.9 %
INTERPRETATION ECG - MUSE: NORMAL
IRON SATN MFR SERPL: ABNORMAL % (ref 20–55)
IRON SERPL-MCNC: <10 UG/DL (ref 50–212)
KETONES UR STRIP-MCNC: NEGATIVE MG/DL
LABORATORY COMMENT REPORT: NORMAL
LACTATE BLD-SCNC: 1.3 MMOL/L (ref 0.7–2)
LACTATE BLD-SCNC: 1.5 MMOL/L (ref 0.7–2)
LEUKOCYTE ESTERASE UR QL STRIP: NEGATIVE
LYMPHOCYTES # BLD AUTO: 0.5 10E9/L (ref 0.8–5.3)
LYMPHOCYTES NFR BLD AUTO: 4.9 %
MCH RBC QN AUTO: 21.2 PG (ref 26.5–33)
MCHC RBC AUTO-ENTMCNC: 30.9 G/DL (ref 31.5–36.5)
MCV RBC AUTO: 69 FL (ref 78–100)
MONOCYTES # BLD AUTO: 0.9 10E9/L (ref 0–1.3)
MONOCYTES NFR BLD AUTO: 8 %
MUCOUS THREADS #/AREA URNS LPF: PRESENT /LPF
NEUTROPHILS # BLD AUTO: 9.2 10E9/L (ref 1.6–8.3)
NEUTROPHILS NFR BLD AUTO: 84.9 %
NITRATE UR QL: NEGATIVE
NT-PROBNP SERPL-MCNC: 316 PG/ML (ref 0–100)
O2/TOTAL GAS SETTING VFR VENT: 34 %
OXYHGB MFR BLD: 91 % (ref 92–100)
PCO2 BLD: 32 MM HG (ref 35–45)
PH BLD: 7.5 PH (ref 7.35–7.45)
PH UR STRIP: 7 PH (ref 5–7)
PLATELET # BLD AUTO: 313 10E9/L (ref 150–450)
PO2 BLD: 63 MM HG (ref 80–105)
POTASSIUM SERPL-SCNC: 3.3 MMOL/L (ref 3.5–5.1)
PROCALCITONIN SERPL-MCNC: 0.97 NG/ML
PROT SERPL-MCNC: 6.8 G/DL (ref 6.4–8.9)
RBC # BLD AUTO: 3.91 10E12/L (ref 4.4–5.9)
RBC #/AREA URNS AUTO: 0 /HPF (ref 0–2)
SARS-COV-2 RNA RESP QL NAA+PROBE: NEGATIVE
SODIUM SERPL-SCNC: 133 MMOL/L (ref 134–144)
SOURCE: ABNORMAL
SP GR UR STRIP: 1.04 (ref 1–1.03)
SPECIMEN SOURCE: NORMAL
TIBC SERPL-MCNC: 310.8 UG/DL (ref 245–400)
TROPONIN I SERPL-MCNC: 140.8 PG/ML
TROPONIN I SERPL-MCNC: 182.1 PG/ML
TROPONIN I SERPL-MCNC: 182.1 PG/ML
TSH SERPL DL<=0.05 MIU/L-ACNC: 3.56 IU/ML (ref 0.34–5.6)
UIBC (UNSATURATED): ABNORMAL MG/DL
UROBILINOGEN UR STRIP-MCNC: NORMAL MG/DL (ref 0–2)
WBC # BLD AUTO: 10.8 10E9/L (ref 4–11)
WBC #/AREA URNS AUTO: <1 /HPF (ref 0–5)

## 2021-06-15 PROCEDURE — 83550 IRON BINDING TEST: CPT | Performed by: INTERNAL MEDICINE

## 2021-06-15 PROCEDURE — 250N000009 HC RX 250: Performed by: INTERNAL MEDICINE

## 2021-06-15 PROCEDURE — 96375 TX/PRO/DX INJ NEW DRUG ADDON: CPT | Performed by: PHYSICIAN ASSISTANT

## 2021-06-15 PROCEDURE — 71045 X-RAY EXAM CHEST 1 VIEW: CPT

## 2021-06-15 PROCEDURE — 87040 BLOOD CULTURE FOR BACTERIA: CPT | Mod: 91 | Performed by: PHYSICIAN ASSISTANT

## 2021-06-15 PROCEDURE — 80053 COMPREHEN METABOLIC PANEL: CPT | Performed by: PHYSICIAN ASSISTANT

## 2021-06-15 PROCEDURE — 85379 FIBRIN DEGRADATION QUANT: CPT | Performed by: PHYSICIAN ASSISTANT

## 2021-06-15 PROCEDURE — 85025 COMPLETE CBC W/AUTO DIFF WBC: CPT | Performed by: PHYSICIAN ASSISTANT

## 2021-06-15 PROCEDURE — 83605 ASSAY OF LACTIC ACID: CPT | Performed by: INTERNAL MEDICINE

## 2021-06-15 PROCEDURE — 87449 NOS EACH ORGANISM AG IA: CPT | Performed by: INTERNAL MEDICINE

## 2021-06-15 PROCEDURE — 93005 ELECTROCARDIOGRAM TRACING: CPT | Performed by: PHYSICIAN ASSISTANT

## 2021-06-15 PROCEDURE — 93306 TTE W/DOPPLER COMPLETE: CPT | Mod: 26 | Performed by: STUDENT IN AN ORGANIZED HEALTH CARE EDUCATION/TRAINING PROGRAM

## 2021-06-15 PROCEDURE — 83605 ASSAY OF LACTIC ACID: CPT | Performed by: PHYSICIAN ASSISTANT

## 2021-06-15 PROCEDURE — 250N000011 HC RX IP 250 OP 636: Performed by: PHYSICIAN ASSISTANT

## 2021-06-15 PROCEDURE — 94640 AIRWAY INHALATION TREATMENT: CPT

## 2021-06-15 PROCEDURE — 84145 PROCALCITONIN (PCT): CPT | Performed by: INTERNAL MEDICINE

## 2021-06-15 PROCEDURE — 36415 COLL VENOUS BLD VENIPUNCTURE: CPT | Performed by: PHYSICIAN ASSISTANT

## 2021-06-15 PROCEDURE — 87385 HISTOPLASMA CAPSUL AG IA: CPT | Performed by: INTERNAL MEDICINE

## 2021-06-15 PROCEDURE — 93010 ELECTROCARDIOGRAM REPORT: CPT | Performed by: INTERNAL MEDICINE

## 2021-06-15 PROCEDURE — 85652 RBC SED RATE AUTOMATED: CPT | Performed by: PHYSICIAN ASSISTANT

## 2021-06-15 PROCEDURE — 272N000054 HC CANNULA HIGH FLOW, ADULT

## 2021-06-15 PROCEDURE — 87899 AGENT NOS ASSAY W/OPTIC: CPT | Performed by: INTERNAL MEDICINE

## 2021-06-15 PROCEDURE — 86140 C-REACTIVE PROTEIN: CPT | Performed by: PHYSICIAN ASSISTANT

## 2021-06-15 PROCEDURE — 96365 THER/PROPH/DIAG IV INF INIT: CPT | Performed by: PHYSICIAN ASSISTANT

## 2021-06-15 PROCEDURE — 83880 ASSAY OF NATRIURETIC PEPTIDE: CPT | Performed by: PHYSICIAN ASSISTANT

## 2021-06-15 PROCEDURE — 87205 SMEAR GRAM STAIN: CPT | Performed by: INTERNAL MEDICINE

## 2021-06-15 PROCEDURE — 120N000001 HC R&B MED SURG/OB

## 2021-06-15 PROCEDURE — 258N000003 HC RX IP 258 OP 636: Performed by: PHYSICIAN ASSISTANT

## 2021-06-15 PROCEDURE — 93306 TTE W/DOPPLER COMPLETE: CPT

## 2021-06-15 PROCEDURE — 999N000157 HC STATISTIC RCP TIME EA 10 MIN

## 2021-06-15 PROCEDURE — 250N000013 HC RX MED GY IP 250 OP 250 PS 637: Performed by: PHYSICIAN ASSISTANT

## 2021-06-15 PROCEDURE — 87070 CULTURE OTHR SPECIMN AEROBIC: CPT | Performed by: INTERNAL MEDICINE

## 2021-06-15 PROCEDURE — 36415 COLL VENOUS BLD VENIPUNCTURE: CPT | Performed by: INTERNAL MEDICINE

## 2021-06-15 PROCEDURE — U0005 INFEC AGEN DETEC AMPLI PROBE: HCPCS | Performed by: PHYSICIAN ASSISTANT

## 2021-06-15 PROCEDURE — 84443 ASSAY THYROID STIM HORMONE: CPT | Performed by: PHYSICIAN ASSISTANT

## 2021-06-15 PROCEDURE — 99285 EMERGENCY DEPT VISIT HI MDM: CPT | Performed by: PHYSICIAN ASSISTANT

## 2021-06-15 PROCEDURE — C9803 HOPD COVID-19 SPEC COLLECT: HCPCS | Performed by: PHYSICIAN ASSISTANT

## 2021-06-15 PROCEDURE — 250N000011 HC RX IP 250 OP 636: Performed by: INTERNAL MEDICINE

## 2021-06-15 PROCEDURE — 71275 CT ANGIOGRAPHY CHEST: CPT

## 2021-06-15 PROCEDURE — 82805 BLOOD GASES W/O2 SATURATION: CPT | Performed by: PHYSICIAN ASSISTANT

## 2021-06-15 PROCEDURE — 81001 URINALYSIS AUTO W/SCOPE: CPT | Performed by: PHYSICIAN ASSISTANT

## 2021-06-15 PROCEDURE — 99285 EMERGENCY DEPT VISIT HI MDM: CPT | Mod: 25 | Performed by: PHYSICIAN ASSISTANT

## 2021-06-15 PROCEDURE — 250N000013 HC RX MED GY IP 250 OP 250 PS 637: Performed by: INTERNAL MEDICINE

## 2021-06-15 PROCEDURE — 99223 1ST HOSP IP/OBS HIGH 75: CPT | Mod: AI | Performed by: INTERNAL MEDICINE

## 2021-06-15 PROCEDURE — U0003 INFECTIOUS AGENT DETECTION BY NUCLEIC ACID (DNA OR RNA); SEVERE ACUTE RESPIRATORY SYNDROME CORONAVIRUS 2 (SARS-COV-2) (CORONAVIRUS DISEASE [COVID-19]), AMPLIFIED PROBE TECHNIQUE, MAKING USE OF HIGH THROUGHPUT TECHNOLOGIES AS DESCRIBED BY CMS-2020-01-R: HCPCS | Performed by: PHYSICIAN ASSISTANT

## 2021-06-15 PROCEDURE — 83540 ASSAY OF IRON: CPT | Performed by: INTERNAL MEDICINE

## 2021-06-15 PROCEDURE — 84484 ASSAY OF TROPONIN QUANT: CPT | Performed by: PHYSICIAN ASSISTANT

## 2021-06-15 PROCEDURE — 96367 TX/PROPH/DG ADDL SEQ IV INF: CPT | Performed by: PHYSICIAN ASSISTANT

## 2021-06-15 RX ORDER — AMOXICILLIN 250 MG
2 CAPSULE ORAL 2 TIMES DAILY
Status: DISCONTINUED | OUTPATIENT
Start: 2021-06-15 | End: 2021-06-19 | Stop reason: HOSPADM

## 2021-06-15 RX ORDER — ACETAMINOPHEN 325 MG/1
650 TABLET ORAL EVERY 4 HOURS PRN
Status: DISCONTINUED | OUTPATIENT
Start: 2021-06-15 | End: 2021-06-19 | Stop reason: HOSPADM

## 2021-06-15 RX ORDER — ASPIRIN 81 MG/1
324 TABLET, CHEWABLE ORAL ONCE
Status: COMPLETED | OUTPATIENT
Start: 2021-06-15 | End: 2021-06-15

## 2021-06-15 RX ORDER — CEFTRIAXONE SODIUM 1 G/50ML
1 INJECTION, SOLUTION INTRAVENOUS ONCE
Status: COMPLETED | OUTPATIENT
Start: 2021-06-15 | End: 2021-06-15

## 2021-06-15 RX ORDER — ALBUTEROL SULFATE 0.83 MG/ML
3 SOLUTION RESPIRATORY (INHALATION) 4 TIMES DAILY
Status: DISCONTINUED | OUTPATIENT
Start: 2021-06-15 | End: 2021-06-19 | Stop reason: HOSPADM

## 2021-06-15 RX ORDER — OXYCODONE HYDROCHLORIDE 5 MG/1
5 TABLET ORAL EVERY 6 HOURS PRN
COMMUNITY

## 2021-06-15 RX ORDER — ONDANSETRON 4 MG/1
4 TABLET, ORALLY DISINTEGRATING ORAL EVERY 6 HOURS PRN
Status: DISCONTINUED | OUTPATIENT
Start: 2021-06-15 | End: 2021-06-19 | Stop reason: HOSPADM

## 2021-06-15 RX ORDER — LORAZEPAM 2 MG/ML
0.5 INJECTION INTRAMUSCULAR ONCE
Status: COMPLETED | OUTPATIENT
Start: 2021-06-15 | End: 2021-06-15

## 2021-06-15 RX ORDER — LORAZEPAM 0.5 MG/1
.5-1 TABLET ORAL EVERY 4 HOURS PRN
Status: DISCONTINUED | OUTPATIENT
Start: 2021-06-15 | End: 2021-06-19 | Stop reason: HOSPADM

## 2021-06-15 RX ORDER — AMOXICILLIN 250 MG
1 CAPSULE ORAL 2 TIMES DAILY
Status: DISCONTINUED | OUTPATIENT
Start: 2021-06-15 | End: 2021-06-19 | Stop reason: HOSPADM

## 2021-06-15 RX ORDER — LORAZEPAM 2 MG/ML
.5-1 INJECTION INTRAMUSCULAR EVERY 4 HOURS PRN
Status: DISCONTINUED | OUTPATIENT
Start: 2021-06-15 | End: 2021-06-15

## 2021-06-15 RX ORDER — DULOXETIN HYDROCHLORIDE 30 MG/1
60 CAPSULE, DELAYED RELEASE ORAL DAILY
Status: DISCONTINUED | OUTPATIENT
Start: 2021-06-16 | End: 2021-06-19 | Stop reason: HOSPADM

## 2021-06-15 RX ORDER — FUROSEMIDE 10 MG/ML
40 INJECTION INTRAMUSCULAR; INTRAVENOUS ONCE
Status: COMPLETED | OUTPATIENT
Start: 2021-06-15 | End: 2021-06-15

## 2021-06-15 RX ORDER — BUPROPION HYDROCHLORIDE 150 MG/1
300 TABLET ORAL EVERY MORNING
Status: DISCONTINUED | OUTPATIENT
Start: 2021-06-16 | End: 2021-06-19 | Stop reason: HOSPADM

## 2021-06-15 RX ORDER — SODIUM CHLORIDE 9 MG/ML
INJECTION, SOLUTION INTRAVENOUS CONTINUOUS
Status: DISCONTINUED | OUTPATIENT
Start: 2021-06-15 | End: 2021-06-15

## 2021-06-15 RX ORDER — LIDOCAINE 40 MG/G
CREAM TOPICAL
Status: DISCONTINUED | OUTPATIENT
Start: 2021-06-15 | End: 2021-06-19 | Stop reason: HOSPADM

## 2021-06-15 RX ORDER — IOPAMIDOL 755 MG/ML
84 INJECTION, SOLUTION INTRAVASCULAR ONCE
Status: COMPLETED | OUTPATIENT
Start: 2021-06-15 | End: 2021-06-15

## 2021-06-15 RX ORDER — ONDANSETRON 2 MG/ML
4 INJECTION INTRAMUSCULAR; INTRAVENOUS EVERY 6 HOURS PRN
Status: DISCONTINUED | OUTPATIENT
Start: 2021-06-15 | End: 2021-06-19 | Stop reason: HOSPADM

## 2021-06-15 RX ORDER — PANTOPRAZOLE SODIUM 40 MG/1
40 TABLET, DELAYED RELEASE ORAL DAILY
Status: DISCONTINUED | OUTPATIENT
Start: 2021-06-15 | End: 2021-06-19 | Stop reason: HOSPADM

## 2021-06-15 RX ORDER — DICLOFENAC SODIUM 75 MG/1
TABLET, DELAYED RELEASE ORAL 2 TIMES DAILY PRN
COMMUNITY

## 2021-06-15 RX ORDER — GUAIFENESIN/DEXTROMETHORPHAN 100-10MG/5
10 SYRUP ORAL EVERY 4 HOURS PRN
Status: DISCONTINUED | OUTPATIENT
Start: 2021-06-15 | End: 2021-06-19 | Stop reason: HOSPADM

## 2021-06-15 RX ORDER — NICOTINE 21 MG/24HR
1 PATCH, TRANSDERMAL 24 HOURS TRANSDERMAL DAILY PRN
Status: DISCONTINUED | OUTPATIENT
Start: 2021-06-15 | End: 2021-06-17 | Stop reason: CLARIF

## 2021-06-15 RX ORDER — NICOTINE POLACRILEX 4 MG/1
1 GUM, CHEWING ORAL DAILY
Status: DISCONTINUED | OUTPATIENT
Start: 2021-06-16 | End: 2021-06-15 | Stop reason: CLARIF

## 2021-06-15 RX ORDER — PROCHLORPERAZINE MALEATE 10 MG
10 TABLET ORAL EVERY 6 HOURS PRN
Status: DISCONTINUED | OUTPATIENT
Start: 2021-06-15 | End: 2021-06-19 | Stop reason: HOSPADM

## 2021-06-15 RX ORDER — TRAZODONE HYDROCHLORIDE 50 MG/1
50-100 TABLET, FILM COATED ORAL
COMMUNITY

## 2021-06-15 RX ORDER — PROCHLORPERAZINE 25 MG
25 SUPPOSITORY, RECTAL RECTAL EVERY 12 HOURS PRN
Status: DISCONTINUED | OUTPATIENT
Start: 2021-06-15 | End: 2021-06-19 | Stop reason: HOSPADM

## 2021-06-15 RX ORDER — POLYETHYLENE GLYCOL 3350 17 G/17G
17 POWDER, FOR SOLUTION ORAL DAILY PRN
Status: DISCONTINUED | OUTPATIENT
Start: 2021-06-15 | End: 2021-06-19 | Stop reason: HOSPADM

## 2021-06-15 RX ADMIN — FUROSEMIDE 40 MG: 10 INJECTION, SOLUTION INTRAVENOUS at 14:19

## 2021-06-15 RX ADMIN — LORAZEPAM 0.5 MG: 2 INJECTION, SOLUTION INTRAMUSCULAR; INTRAVENOUS at 12:17

## 2021-06-15 RX ADMIN — TAZOBACTAM SODIUM AND PIPERACILLIN SODIUM 4.5 G: 500; 4 INJECTION, SOLUTION INTRAVENOUS at 19:50

## 2021-06-15 RX ADMIN — IOPAMIDOL 84 ML: 755 INJECTION, SOLUTION INTRAVENOUS at 12:38

## 2021-06-15 RX ADMIN — VANCOMYCIN HYDROCHLORIDE 1500 MG: 1 INJECTION, POWDER, LYOPHILIZED, FOR SOLUTION INTRAVENOUS at 16:39

## 2021-06-15 RX ADMIN — TAZOBACTAM SODIUM AND PIPERACILLIN SODIUM 3.38 G: 375; 3 INJECTION, SOLUTION INTRAVENOUS at 16:23

## 2021-06-15 RX ADMIN — SODIUM CHLORIDE: 9 INJECTION, SOLUTION INTRAVENOUS at 20:33

## 2021-06-15 RX ADMIN — ENOXAPARIN SODIUM 40 MG: 100 INJECTION SUBCUTANEOUS at 19:50

## 2021-06-15 RX ADMIN — SODIUM CHLORIDE: 9 INJECTION, SOLUTION INTRAVENOUS at 11:16

## 2021-06-15 RX ADMIN — ALBUTEROL SULFATE 2.5 MG: 2.5 SOLUTION RESPIRATORY (INHALATION) at 23:19

## 2021-06-15 RX ADMIN — PANTOPRAZOLE SODIUM 40 MG: 40 TABLET, DELAYED RELEASE ORAL at 19:50

## 2021-06-15 RX ADMIN — CEFTRIAXONE SODIUM 1 G: 1 INJECTION, SOLUTION INTRAVENOUS at 14:13

## 2021-06-15 RX ADMIN — DOCUSATE SODIUM 50 MG AND SENNOSIDES 8.6 MG 1 TABLET: 8.6; 5 TABLET, FILM COATED ORAL at 19:50

## 2021-06-15 RX ADMIN — LORAZEPAM 1 MG: 0.5 TABLET ORAL at 23:16

## 2021-06-15 RX ADMIN — ASPIRIN 81 MG CHEWABLE TABLET 324 MG: 81 TABLET CHEWABLE at 12:19

## 2021-06-15 ASSESSMENT — ENCOUNTER SYMPTOMS
ABDOMINAL PAIN: 0
FREQUENCY: 0
PALPITATIONS: 0
SEIZURES: 0
SINUS PRESSURE: 0
NECK PAIN: 0
EYE PAIN: 0
BACK PAIN: 0
DIARRHEA: 0
CHILLS: 0
WHEEZING: 0
WEAKNESS: 0
FACIAL SWELLING: 0
FEVER: 0
WOUND: 0
NAUSEA: 0
COUGH: 0
HEADACHES: 0
APPETITE CHANGE: 0
DIZZINESS: 0
BRUISES/BLEEDS EASILY: 0
VOICE CHANGE: 0
CHOKING: 0
SORE THROAT: 0
FATIGUE: 0
CHEST TIGHTNESS: 0
DYSURIA: 0
ADENOPATHY: 0
HEMATURIA: 0
VOMITING: 0
LIGHT-HEADEDNESS: 0
CONFUSION: 0
TROUBLE SWALLOWING: 0
ACTIVITY CHANGE: 0
SHORTNESS OF BREATH: 1

## 2021-06-15 ASSESSMENT — MIFFLIN-ST. JEOR: SCORE: 1691.63

## 2021-06-15 ASSESSMENT — ACTIVITIES OF DAILY LIVING (ADL): ADLS_ACUITY_SCORE: 18

## 2021-06-15 NOTE — H&P
[facility]    History and Physical  Hospitalist       Date of Admission:  6/15/2021    Assessment & Plan   Augie De Jesus is a 60 year old male who presents with progressive dyspnea and acute respiratory failure with hypoxia.    Principal Problem:    Acute respiratory failure with hypoxia (H)    Assessment: Differential includes bacterial pneumonia given his productive cough, and elevated inflammatory markers CT findings and heavy history of alcohol abuse with last drink a week ago aspiration certainly could play a role, less likely to clinically have pulmonary edema contributing, given the relative chronicity of the worsening breathing will test for fungal infections, no evidence of PE, COPD or ACS playing a role.    Plan:   -IV vancomycin/Zosyn day 1  -Follow-up blood cultures  -Obtain sputum cultures if able  -Check urine strep and Legionella  -Scheduled nebs  -Check pro calcitonin  -Obtain histo/blasto urine and blood antigens    Active Problems:    Hypertension    Assessment: Chronic and stable, not actively treated    Plan:   -monitor    Anemia  Assessment: Chronic and thought to be due to chronic GI losses  Plan:  -Recheck iron panel  -Continue PPI  -Avoid NSAIDs      YARELIS (obstructive sleep apnea)    Assessment: Chronic, not consistently compliant with CPAP given claustrophobia    Plan:   -She had [facility] for CPAP set up      Alcohol abuse    Assessment: Per report last drink was about a week ago, no withdrawal symptoms    Plan:   -Monitor for evidence of withdrawal and start CIWA if needed      Elevated troponin    Assessment: Now peaked with no further escalation troponin, no new wall motion abnormalities on TTE and no new EKG findings consistent cardiopulmonary symptoms concerning for ACS.  More likely related to hypoxia and supply demand mismatch.    Plan:   -Trend  -Telemetry    FEN: regular diet, normal saline at 0 mL/hr, mg/k replacement protocol  PPX: Lovenox, PROTON PUMP  INHIBITOR    Code Status: No Order    Valdez Adams    Primary Care Physician   Thiago Ocasio    Chief Complaint   Dyspnea    History is obtained from the patient and chart review.    History of Present Illness   Augie De Jesus is a 60 year old male who presents with worsening shortness of breath.  Patient is felt intermittently short of breath for the last 6 months but worse over the last 1 month.  Significant worsening dyspnea with exertion, he is also noted a productive cough with green and brown sputum during this time, he denies having Covid related symptoms at any time and received his vaccination about 1 week ago.  Denies any specific fevers or aspiration events, breathing felt worse today so he presented to the ER, there had a significantly elevated D-dimer, underwent CT PE protocol, was started on antibiotics, troponins remained stable despite being elevated and he was subsequently admitted for further management.    Past Medical History    I have reviewed this patient's medical history and updated it with pertinent information if needed.   Past Medical History:   Diagnosis Date     Alcohol-induced acute pancreatitis without infection or necrosis     07/2009,Pancreatitis July 2009, likely related to alcohol     Chondromalacia of knee     4/20/2015     Essential (primary) hypertension     5/12/2010     Gastro-esophageal reflux disease without esophagitis     10/15/2010     Major depressive disorder, single episode     3/24/2010     Obstructive sleep apnea     uses CPAP     Other specified cardiac arrhythmias (CODE)     5/4/2011     Other tear of medial meniscus, current injury, unspecified knee, initial encounter     4/20/2015     Personal history of other medical treatment (CODE)     3/22/2012     Primary osteoarthritis of both first carpometacarpal joints     10/20/2016     Primary osteoarthritis of hand     10/24/2016     Primary osteoarthritis of one knee     4/28/2010     Residual hemorrhoidal skin tags      5/4/2011     Sciatica     3/24/2010     Uncomplicated alcohol dependence (H)     2012,treatment fall 2012.       Past Surgical History   I have reviewed this patient's surgical history and updated it with pertinent information if needed.  Past Surgical History:   Procedure Laterality Date     ARTHROPLASTY ANKLE Right 2017     ARTHROPLASTY KNEE Left     6/25/15,Knee Replacement, Total Dr. Hernandez     COLONOSCOPY  03/16/2018    Normal follow up 2028     COLONOSCOPY N/A 3/16/2018    Procedure: COLONOSCOPY;  Colonoscopy;  Surgeon: Jimmy Phelps MD;  Location:  OR       Prior to Admission Medications   Prior to Admission Medications   Prescriptions Last Dose Informant Patient Reported? Taking?   DULoxetine (CYMBALTA) 60 MG capsule 6/15/2021 at am  No Yes   Sig: TAKE 1 CAPSULE (60 MG) BY MOUTH DAILY   acamprosate (CAMPRAL) 333 MG EC tablet 6/15/2021 at am  No Yes   Sig: Take 2 tablets (666 mg) by mouth 3 times daily   acetaminophen (TYLENOL) 500 MG tablet Unknown at Unknown time  No No   Sig: Take 2 tablets (1,000 mg) by mouth every 8 hours as needed for mild pain   bisacodyl (DULCOLAX) 5 MG EC tablet not yet at not yet  No No   Sig: Use as directed for colonoscopy prep   buPROPion (WELLBUTRIN XL) 300 MG 24 hr tablet 6/15/2021 at am  No Yes   Sig: Take 1 tablet (300 mg) by mouth every morning   diclofenac (VOLTAREN) 75 MG EC tablet More than a month at Unknown time  Yes No   Sig: Take by mouth 2 times daily as needed for moderate pain Hold while on naproxen   naproxen (NAPROSYN) 500 MG tablet 6/15/2021 at am  No Yes   Sig: Take 1 tablet (500 mg) by mouth 2 times daily (with meals)   omeprazole 20 MG tablet 6/15/2021 at am  No Yes   Sig: TAKE 1 TABLET BY MOUTH EVERY DAY   polyethylene glycol (MIRALAX) 17 GM/Dose powder not yet at not yet  No No   Sig: Mix with 255g with 64 oz of Gatorade (not red or purple) drink at a rate of 8oz every 10 min as directed for colonoscopy prep      Facility-Administered Medications:  None     Allergies   Allergies   Allergen Reactions     Hydrocodone Itching       Social History   I have reviewed this patient's social history and updated it with pertinent information if needed. Augie De Jesus  reports that he has never smoked. He has never used smokeless tobacco. He reports current alcohol use. He reports previous drug use. Drug: Other.    Family History   I have reviewed this patient's family history and updated it with pertinent information if needed.   Family History   Problem Relation Age of Onset     Heart Disease Mother         Heart Disease, heart failure     Substance Abuse Father         Alcohol/Drug,  of COPD     Cancer Sister         Cancer,uterine cancer       Review of Systems     REVIEW OF SYSTEMS:    Constitutional: normal energy and appetite, no recent sick contacts  Eyes: no changes in vision  Ears, nose, mouth, throat, and face: no mouth sores, dysphagia, or odynophagia  Respiratory: no shortness of breath, cough, or wheezing. No aspiration symptoms.   Cardiovascular: no chest pain exertion, palpitations, no orthopnea, increased lower extremity edema, or syncope.   Gastrointestinal: Min constipation, no diarrhea, nausea, vomiting or abdominal pain.  Genitourinary: no dysuria, hematuria, urgency or frequency.   Hematologic/lymphatic: no unintentional weight loss or night sweats.  Musculoskeletal: no pain to extremities or falls.   Neurological: no new weakness, tingling, numbness.   Endocrine: not a known diabetic.     Additions to the above include: No recent travel, no known mold or fungus exposures.    Physical Exam   Temp: 98.7  F (37.1  C) Temp src: Tympanic BP: (!) 154/93 Pulse: 98   Resp: 11 SpO2: 95 % O2 Device: Nasal cannula Oxygen Delivery: 2 LPM  Vital Signs with Ranges  Temp:  [98.7  F (37.1  C)] 98.7  F (37.1  C)  Pulse:  [] 98  Resp:  [11-35] 11  BP: (134-161)/(73-98) 154/93  SpO2:  [89 %-96 %] 95 %  201 lbs 4.48 oz    Exam:  GENERAL: Talkative, laying  in bed, in no apparent distress.  Head: normocephalic and atraumatic  Eyes: anicteric and non-injected sclera  Nose: no rhinorrhea or epistaxis.   Throat: moist mucous membranes with no active oral lesions.  NECK: Supple, jugular venous distension not present.  CARDIOVASCULAR: regular rate and rhythm, no murmurs, rubs, or gallops. Normal S1/S2. No lower extremity edema.   RESPIRATORY: Scant and mild coarse breath sounds in all fields with diffuse air movement in all fields,  no wheezes, no crackles.  No accessory muscle use or evidence of respiratory distress.   GI: soft, non-tender deep palpation in all quadrants, non-distended, normoactive bowel sounds.  MUSCULOSKELETAL: warm and well perfused, 2+ dorsalis pedis pulses.    SKIN: no pallor, jaundice or rashes.  NEUROLOGY: AAOx3, follows commands, speech and language without focal deficits.     Data   Data reviewed today:      EKG personally reviewed: Wandering baseline, sinus rhythm, no new ST-T wave inversions elevations or depressions.    Recent Labs   Lab 06/15/21  1125   WBC 10.8   HGB 8.3*   MCV 69*      *   POTASSIUM 3.3*   CHLORIDE 98   CO2 20*   BUN 12   CR 0.77   ANIONGAP 15*   KUMAR 8.4*   *   ALBUMIN 3.3*   PROTTOTAL 6.8   BILITOTAL 0.9   ALKPHOS 135*   ALT 38   AST 58*       Recent Results (from the past 24 hour(s))   XR Chest Port 1 View    Narrative    PROCEDURE:  XR CHEST PORT 1 VIEW    HISTORY: sob. .    COMPARISON:  3/17/2020    FINDINGS:    The cardiomediastinal contours are stable.  Patchy airspace consolidation is present throughout both lower lungs.  No effusion or pneumothorax is identified.      Impression    IMPRESSION:  Finding suggests multifocal pneumonia or other acute  pneumonitis. Recommend follow-up to resolution.      ETHEL MONSALVE MD   CT Chest Pulmonary Embolism w Contrast    Narrative    PROCEDURE: CT CHEST PULMONARY EMBOLISM W CONTRAST 6/15/2021 12:42 PM    HISTORY: PE suspected, high prob    COMPARISONS:  None.    TECHNIQUE: Axial postcontrast enhanced images were obtained with  coronal and sagittal MIP images.    FINDINGS: There is excellent opacification of pulmonary vessels. No  pulmonary embolus is seen.     There aren't enlarged mediastinal lymph nodes including right  paratracheal lymph node that measures up to 1.7 cm in short axis  dimension and AP window node which measures 12 mm in short axis  dimension. Subcarinal lymph node enlargement is seen as well. This is  nonspecific but may be reactive given lung changes.    There are diffuse bilateral lung infiltrates many with groundglass  type density.    There are small bilateral pleural effusions. No pericardial effusion  is seen. Heart is enlarged. There is some coronary artery  calcification. There is a moderate size hiatal hernia.    Limited images through the upper abdomen show no suspicious  abnormality. Degenerative changes seen in the spine.           Impression    IMPRESSION:   1. No pulmonary embolus.  2. Diffuse bilateral groundglass opacities, probably due to multifocal  pneumonia. Pulmonary edema is also possible.  3. The lymph node enlargement which may be reactive related to lung  changes.  4. Hiatal hernia.    JERRICA RUIZ MD   Echocardiogram Complete    Narrative    [id number]  [id number]  [id number]  [id number]^JF^SANCHO^NATE     [facility]  [address] [place] [address]     Name: CAT GORDON  MRN: [id number]  : 1960  Study Date: 06/15/2021 02:20 PM  Age: 60 yrs  Gender: Male  Patient Location: [facility]  Reason For Study: CHF  Ordering Physician: SANCHO RHOADES  Performed By: Julee Harrison RDCS, RVT     BSA: 2.0 m2  Height: 67 in  Weight: 201 lb  HR: 105  BP: 134/98 mmHg  ______________________________________________________________________________  Procedure  Complete Portable Echo Adult.  ______________________________________________________________________________  Interpretation  Summary  Global and regional left ventricular function is normal with an EF of 55-60%.  The right ventricle is normal size. Global right ventricular function is  normal.  The inferior vena cava was normal in size with preserved respiratory  variability.  No pericardial effusion is present.     This study was compared with the study from 4/13/2020. No significant changes  noted.  ______________________________________________________________________________  Left Ventricle  Global and regional left ventricular function is normal with an EF of 55-60%.  Mild concentric wall thickening consistent with left ventricular hypertrophy  is present. Diastolic function not assessed due to tachycardia.     Right Ventricle  The right ventricle is normal size. Global right ventricular function is  normal.     Atria  Both atria appear normal.     Mitral Valve  Mild mitral annular calcification is present. Trace mitral insufficiency is  present.     Aortic Valve  The aortic valve is tricuspid. On Doppler interrogation, there is no  significant stenosis or regurgitation.     Tricuspid Valve  The tricuspid valve is normal. Trace tricuspid insufficiency is present. The  right ventricular systolic pressure is approximated at 25.4 mmHg plus the  right atrial pressure. Pulmonary artery systolic pressure is normal.     Pulmonic Valve  The pulmonic valve is normal. Trace pulmonic insufficiency is present.     Vessels  The aorta root is normal. The thoracic aorta is normal. The inferior vena cava  was normal in size with preserved respiratory variability.     Pericardium  No pericardial effusion is present.     Compared to Previous Study  This study was compared with the study from 4/13/2020 . No significant changes  noted.     ______________________________________________________________________________  MMode/2D Measurements & Calculations  IVSd: 1.4 cm  LVIDd: 3.5 cm  LVIDs: 2.3 cm  LVPWd: 1.1 cm  FS: 34.6 %     LV mass(C)d: 148.3  grams  LV mass(C)dI: 73.2 grams/m2  Ao root diam: 3.1 cm  asc Aorta Diam: 3.4 cm  LVOT diam: 1.9 cm  LVOT area: 2.8 cm2  LA Volume (BP): 62.6 ml  LA Volume Index (BP): 30.8 ml/m2  RWT: 0.65     Doppler Measurements & Calculations  MV E max yash: 107.0 cm/sec  MV A max yash: 150.0 cm/sec  MV E/A: 0.71     MV dec slope: 562.0 cm/sec2  MV dec time: 0.19 sec  Ao V2 max: 139.0 cm/sec  Ao max P.0 mmHg  KELSI(V,D): 2.5 cm2  LV V1 max P.1 mmHg  LV V1 max: 123.0 cm/sec  LV V1 VTI: 22.2 cm  SV(LVOT): 62.9 ml  SI(LVOT): 31.1 ml/m2  TR max yash: 252.0 cm/sec  TR max P.4 mmHg  AV Yash Ratio (DI): 0.88  E/E' av.2  Lateral E/e': 10.4  Medial E/e': 14.0     ______________________________________________________________________________  Report approved by: MD Deshaun Appiah 06/15/2021 02:56 PM

## 2021-06-15 NOTE — PHARMACY-VANCOMYCIN DOSING SERVICE
"Pharmacy Consult- Vancomycin Assessment    Augie De Jesus is a 60 year old male admitted on 6/15/2021.    Vancomycin has been ordered per MD, for the indication of: CAP    Current Antibiotic Regimen Includes: piperacillin/tazobactam    Patient Active Problem List   Diagnosis     CMC arthritis     Depression, major     External hemorrhoids     Gastroesophageal reflux disease     Hypertension     YARELIS (obstructive sleep apnea)     Primary osteoarthritis of both first carpometacarpal joints     S/P total knee arthroplasty     Alcohol dependence in remission (H)     Microcytic anemia     Alcohol abuse     Acute respiratory failure with hypoxia (H)     COULTER (dyspnea on exertion)     Hypoxia     Elevated troponin     Community acquired bacterial pneumonia       Allergies (and reaction): Hydrocodone    Most recent flowsheet Weight: 91.3 kg (201 lb 4.5 oz)  Most recent flowsheet Height: 170.2 cm (5' 7\")      Intake/Output Summary (Last 24 hours) at 6/15/2021 1617  Last data filed at 6/15/2021 1600  Gross per 24 hour   Intake 591.67 ml   Output 1380 ml   Net -788.33 ml       Tmax = Temp (24hrs), Av.7  F (37.1  C), Min:98.7  F (37.1  C), Max:98.7  F (37.1  C)      Recent Labs   Lab Test 06/15/21  1125   WBC 10.8       Recent Labs   Lab Test 06/15/21  1125 21  1422 21  1757   BUN 12 18 14   CR 0.77 1.30 0.87       estimated creatinine clearance is 112.7 mL/min (based on SCr of 0.77 mg/dL).    Cultures Pending: blood x2     Culture Results: COVID 19 negative    Plan: Give vancomycin 1500 mg IVPB x1. Further dosing per provider's plan    Goal AUC : 200 to 400    Thank You for the consult. Will continue to follow.    Lisa Gillespie [facility] ....................  6/15/2021   4:17 PM    "

## 2021-06-15 NOTE — ED PROVIDER NOTES
History     Chief Complaint   Patient presents with     Shortness of Breath     HPI  Augie De Jesus is a 60 year old male who resides at [facility].  He is brought in via EMS complaining of increasing shortness of breath and dyspnea with exertion over the past 2 months.  He states now that he gets winded just tying his shoes.  Denies any chest or abdominal pain.  He reports his hemoglobin is usually on the low side and in the past has been concerned for a possible bleeding ulcer.  He denies any bloody emesis or bloody sputum.  He is noted to be tachycardic and his SaO2 was 88% on room air upon EMS arrival.  After putting him on 3 L nasal cannula his SaO2 increased to 92-94%.  He denies any history of COPD, asthma, or bronchitis.  Denies any use or need for inhalers.  He is not on oxygen.  Denies any lower leg swelling or being on any medication to decrease swelling.  No cough or sore throat.  No fever or chills.  No nausea or vomiting.  No diarrhea or constipation.  He does report at times he gets somewhat lightheaded due to the shortness of breath.  Denies any syncopal episodes.    Allergies:  Allergies   Allergen Reactions     Hydrocodone Itching       Problem List:    Patient Active Problem List    Diagnosis Date Noted     Microcytic anemia 03/19/2020     Priority: Medium     Alcohol dependence in remission (H) 02/22/2018     Priority: Medium     CMC arthritis 10/24/2016     Priority: Medium     Primary osteoarthritis of both first carpometacarpal joints 10/20/2016     Priority: Medium     YARELIS (obstructive sleep apnea) 06/23/2015     Priority: Medium     S/P total knee arthroplasty 06/23/2015     Priority: Medium     External hemorrhoids 05/04/2011     Priority: Medium     Gastroesophageal reflux disease 10/15/2010     Priority: Medium     Hypertension 05/12/2010     Priority: Medium     Depression, major 03/24/2010     Priority: Medium        Past Medical History:    Past Medical History:   Diagnosis Date      Alcohol-induced acute pancreatitis without infection or necrosis      Chondromalacia of knee      Essential (primary) hypertension      Gastro-esophageal reflux disease without esophagitis      Major depressive disorder, single episode      Obstructive sleep apnea      Other specified cardiac arrhythmias (CODE)      Other tear of medial meniscus, current injury, unspecified knee, initial encounter      Personal history of other medical treatment (CODE)      Primary osteoarthritis of both first carpometacarpal joints      Primary osteoarthritis of hand      Primary osteoarthritis of one knee      Residual hemorrhoidal skin tags      Sciatica      Uncomplicated alcohol dependence (H)        Past Surgical History:    Past Surgical History:   Procedure Laterality Date     ARTHROPLASTY ANKLE Right 2017     ARTHROPLASTY KNEE Left     6/25/15,Knee Replacement, Total Dr. Hernandez     COLONOSCOPY  03/16/2018    Normal follow up 2028     COLONOSCOPY N/A 3/16/2018    Procedure: COLONOSCOPY;  Colonoscopy;  Surgeon: Jimmy Phelps MD;  Location:  OR       Family History:    Family History   Problem Relation Age of Onset     Heart Disease Mother         Heart Disease, heart failure     Substance Abuse Father         Alcohol/Drug     Cancer Sister         Cancer,uterine cancer       Social History:  Marital Status:  Single [1]  Social History     Tobacco Use     Smoking status: Never Smoker     Smokeless tobacco: Never Used   Substance Use Topics     Alcohol use: Yes     Frequency: 2-4 times a month     Drinks per session: 10 or more     Binge frequency: Weekly     Comment: Hard liquor     Drug use: Not Currently     Types: Other     Comment: Drug use: No        Medications:    acamprosate (CAMPRAL) 333 MG EC tablet  acetaminophen (TYLENOL) 500 MG tablet  bisacodyl (DULCOLAX) 5 MG EC tablet  buPROPion (WELLBUTRIN XL) 300 MG 24 hr tablet  diclofenac (VOLTAREN) 75 MG EC tablet  DULoxetine (CYMBALTA) 60 MG capsule  naproxen  "(NAPROSYN) 500 MG tablet  omeprazole 20 MG tablet  polyethylene glycol (MIRALAX) 17 GM/Dose powder  polyethylene glycol (MIRALAX) 17 GM/Dose powder          Review of Systems   Constitutional: Negative for activity change, appetite change, chills, fatigue and fever.   HENT: Negative for congestion, facial swelling, sinus pressure, sore throat, trouble swallowing and voice change.    Eyes: Negative for pain and visual disturbance.   Respiratory: Positive for shortness of breath. Negative for cough, choking, chest tightness and wheezing.    Cardiovascular: Negative for chest pain, palpitations and leg swelling.   Gastrointestinal: Negative for abdominal pain, diarrhea, nausea and vomiting.   Genitourinary: Negative for dysuria, frequency, hematuria and urgency.   Musculoskeletal: Negative for back pain and neck pain.   Skin: Negative for rash and wound.   Neurological: Negative for dizziness, seizures, syncope, weakness, light-headedness and headaches.   Hematological: Negative for adenopathy. Does not bruise/bleed easily.   Psychiatric/Behavioral: Negative for confusion.   All other systems reviewed and are negative.      Physical Exam   BP: (!) 137/96  Pulse: 108  Temp: 98.7  F (37.1  C)  Resp: 19  Height: 170.2 cm (5' 7\")  Weight: 91.3 kg (201 lb 4.5 oz)  SpO2: 92 %     Vitals:    06/15/21 1415 06/15/21 1430 06/15/21 1500 06/15/21 1530   BP: (!) 154/93      Pulse: 99 105 100 98   Resp: 26 (!) 31 (!) 35 11   Temp:       TempSrc:       SpO2: 94% 95%     Weight:       Height:           Physical Exam  Vitals signs and nursing note reviewed.   Constitutional:       General: He is not in acute distress.     Appearance: He is not ill-appearing, toxic-appearing or diaphoretic.   HENT:      Head: No raccoon eyes or Colunga's sign.      Jaw: No trismus.      Right Ear: No drainage or tenderness.      Left Ear: No drainage or tenderness.      Nose: Nose normal.   Eyes:      General: Lids are normal. Gaze aligned appropriately. " No scleral icterus.     Extraocular Movements: Extraocular movements intact.      Right eye: Normal extraocular motion and no nystagmus.      Left eye: Normal extraocular motion and no nystagmus.      Pupils: Pupils are equal, round, and reactive to light.      Right eye: Pupil is reactive and not sluggish.      Left eye: Pupil is reactive and not sluggish.   Neck:      Musculoskeletal: Normal range of motion. Normal range of motion. No neck rigidity, pain with movement, spinous process tenderness or muscular tenderness.      Vascular: No JVD.      Trachea: No tracheal deviation.   Cardiovascular:      Rate and Rhythm: Normal rate and regular rhythm.   Pulmonary:      Effort: Pulmonary effort is normal. No respiratory distress.      Breath sounds: Normal breath sounds. No stridor. No wheezing.      Comments: Lung sounds are decreased on the left compared to the right.  He does not appear to be in any respiratory distress.  No tachypnea.  SaO2 is 92% on 3 L of nasal cannula.  Abdominal:      General: Bowel sounds are normal. There is no distension.      Palpations: There is no mass.      Tenderness: There is no abdominal tenderness. There is no right CVA tenderness, left CVA tenderness, guarding or rebound.   Musculoskeletal: Normal range of motion.         General: No tenderness or deformity.   Lymphadenopathy:      Cervical: No cervical adenopathy.   Skin:     General: Skin is warm and dry.      Capillary Refill: Capillary refill takes less than 2 seconds.   Neurological:      General: No focal deficit present.      Mental Status: He is alert and oriented to person, place, and time.      GCS: GCS eye subscore is 4. GCS verbal subscore is 5. GCS motor subscore is 6.      Motor: No tremor or seizure activity.      Coordination: Coordination normal.      Gait: Gait normal.   Psychiatric:         Mood and Affect: Mood normal.         Behavior: Behavior normal.         Thought Content: Thought content normal.          Judgment: Judgment normal.         ED Course     EKG shows sinus tachycardia.  Cannot rule out inferior infarct, age undetermined.  Heart rate is 107.  This essentially unchanged from previous EKG on 5/2/2017 other than the tachycardia.       Results for orders placed or performed during the hospital encounter of 06/15/21 (from the past 24 hour(s))   EKG 12-lead, tracing only   Result Value Ref Range    Interpretation ECG Click View Image link to view waveform and result    Asymptomatic SARS-CoV-2 COVID-19 Virus (Coronavirus) by PCR    Specimen: Nasopharyngeal   Result Value Ref Range    SARS-CoV-2 Virus Specimen Source Nasopharyngeal     SARS-CoV-2 PCR Result NEGATIVE     SARS-CoV-2 PCR Comment       Testing was performed using the [employer] Xpress SARS-CoV-2 Assay on the Cepheid Gene-Xpert   Instrument Systems. Additional information about this Emergency Use Authorization (EUA)   assay can be found via the Lab Guide.     CBC with platelets differential   Result Value Ref Range    WBC 10.8 4.0 - 11.0 10e9/L    RBC Count 3.91 (L) 4.4 - 5.9 10e12/L    Hemoglobin 8.3 (L) 13.3 - 17.7 g/dL    Hematocrit 26.9 (L) 40.0 - 53.0 %    MCV 69 (L) 78 - 100 fl    MCH 21.2 (L) 26.5 - 33.0 pg    MCHC 30.9 (L) 31.5 - 36.5 g/dL    RDW 22.1 (H) 10.0 - 15.0 %    Platelet Count 313 150 - 450 10e9/L    Diff Method Automated Method     % Neutrophils 84.9 %    % Lymphocytes 4.9 %    % Monocytes 8.0 %    % Eosinophils 0.9 %    % Basophils 0.4 %    % Immature Granulocytes 0.9 %    Absolute Neutrophil 9.2 (H) 1.6 - 8.3 10e9/L    Absolute Lymphocytes 0.5 (L) 0.8 - 5.3 10e9/L    Absolute Monocytes 0.9 0.0 - 1.3 10e9/L    Absolute Eosinophils 0.1 0.0 - 0.7 10e9/L    Absolute Basophils 0.0 0.0 - 0.2 10e9/L    Abs Immature Granulocytes 0.1 0 - 0.4 10e9/L   D dimer quantitative   Result Value Ref Range    D Dimer 2.4 (H) 0.0 - 0.50 ug/ml FEU   Comprehensive metabolic panel   Result Value Ref Range    Sodium 133 (L) 134 - 144 mmol/L    Potassium 3.3  (L) 3.5 - 5.1 mmol/L    Chloride 98 98 - 107 mmol/L    Carbon Dioxide 20 (L) 21 - 31 mmol/L    Anion Gap 15 (H) 3 - 14 mmol/L    Glucose 109 (H) 70 - 105 mg/dL    Urea Nitrogen 12 7 - 25 mg/dL    Creatinine 0.77 0.70 - 1.30 mg/dL    GFR Estimate >90 >60 mL/min/[1.73_m2]    GFR Estimate If Black >90 >60 mL/min/[1.73_m2]    Calcium 8.4 (L) 8.6 - 10.3 mg/dL    Bilirubin Total 0.9 0.3 - 1.0 mg/dL    Albumin 3.3 (L) 3.5 - 5.7 g/dL    Protein Total 6.8 6.4 - 8.9 g/dL    Alkaline Phosphatase 135 (H) 34 - 104 U/L    ALT 38 7 - 52 U/L    AST 58 (H) 13 - 39 U/L   Lactic acid whole blood   Result Value Ref Range    Lactic Acid 1.3 0.7 - 2.0 mmol/L   Troponin GH   Result Value Ref Range    Troponin 140.8 (H) <34.0 pg/mL   TSH Reflex GH   Result Value Ref Range    TSH Reflex 3.56 0.34 - 5.60 IU/mL   CRP inflammation   Result Value Ref Range    CRP Inflammation 317.9 (H) <10.0 mg/L   Nt probnp inpatient (BNP)   Result Value Ref Range    N-Terminal Pro BNP Inpatient 316 (H) 0 - 100 pg/mL   Erythrocyte sedimentation rate auto   Result Value Ref Range    Sed Rate 109 (H) 1 - 10 mm/h   Blood gas arterial and oxyhgb   Result Value Ref Range    pH Arterial 7.50 (H) 7.35 - 7.45 pH    pCO2 Arterial 32 (L) 35 - 45 mm Hg    pO2 Arterial 63 (L) 80 - 105 mm Hg    Bicarbonate Arterial 25 21 - 28 mmol/L    FIO2 34     Oxyhemoglobin Arterial 91 (L) 92 - 100 %   XR Chest Port 1 View    Narrative    PROCEDURE:  XR CHEST PORT 1 VIEW    HISTORY: sob. .    COMPARISON:  3/17/2020    FINDINGS:    The cardiomediastinal contours are stable.  Patchy airspace consolidation is present throughout both lower lungs.  No effusion or pneumothorax is identified.      Impression    IMPRESSION:  Finding suggests multifocal pneumonia or other acute  pneumonitis. Recommend follow-up to resolution.      ETHEL MONSALVE MD   CT Chest Pulmonary Embolism w Contrast    Narrative    PROCEDURE: CT CHEST PULMONARY EMBOLISM W CONTRAST 6/15/2021 12:42 PM    HISTORY: PE  suspected, high prob    COMPARISONS: None.    TECHNIQUE: Axial postcontrast enhanced images were obtained with  coronal and sagittal MIP images.    FINDINGS: There is excellent opacification of pulmonary vessels. No  pulmonary embolus is seen.     There aren't enlarged mediastinal lymph nodes including right  paratracheal lymph node that measures up to 1.7 cm in short axis  dimension and AP window node which measures 12 mm in short axis  dimension. Subcarinal lymph node enlargement is seen as well. This is  nonspecific but may be reactive given lung changes.    There are diffuse bilateral lung infiltrates many with groundglass  type density.    There are small bilateral pleural effusions. No pericardial effusion  is seen. Heart is enlarged. There is some coronary artery  calcification. There is a moderate size hiatal hernia.    Limited images through the upper abdomen show no suspicious  abnormality. Degenerative changes seen in the spine.           Impression    IMPRESSION:   1. No pulmonary embolus.  2. Diffuse bilateral groundglass opacities, probably due to multifocal  pneumonia. Pulmonary edema is also possible.  3. The lymph node enlargement which may be reactive related to lung  changes.  4. Hiatal hernia.    JERRICA RUIZ MD   Troponin GH   Result Value Ref Range    Troponin 182.1 (H) <34.0 pg/mL   Iron Binding Panel GH   Result Value Ref Range    Iron <10 (L) 50 - 212 ug/dL    UIBC (Unsaturated) Not Calculated mg/dL    Iron Binding Capacity 310.80 245.00 - 400.00 ug/dL    Iron Saturation Not Calculated 20 - 55 %   UA reflex to Microscopic   Result Value Ref Range    Color Urine Light Yellow     Appearance Urine Clear     Glucose Urine Negative NEG^Negative mg/dL    Bilirubin Urine Negative NEG^Negative    Ketones Urine Negative NEG^Negative mg/dL    Specific Gravity Urine 1.040 (H) 1.003 - 1.035    Blood Urine Negative NEG^Negative    pH Urine 7.0 5.0 - 7.0 pH    Protein Albumin Urine Negative  NEG^Negative mg/dL    Urobilinogen mg/dL Normal 0.0 - 2.0 mg/dL    Nitrite Urine Negative NEG^Negative    Leukocyte Esterase Urine Negative NEG^Negative    Source Midstream Urine     RBC Urine 0 0 - 2 /HPF    WBC Urine <1 0 - 5 /HPF    Mucous Urine Present (A) NEG^Negative /LPF   Troponin GH   Result Value Ref Range    Troponin 182.1 (H) <34.0 pg/mL   Echocardiogram Complete    Narrative    [id number]  [id number]  [id number]  [id number]^JF^SANCHO^NATE     [facility]  [address] [place] [address]     Name: CAT GORDON  MRN: [id number]  : 1960  Study Date: 06/15/2021 02:20 PM  Age: 60 yrs  Gender: Male  Patient Location: [facility]  Reason For Study: CHF  Ordering Physician: SANCHO RHOADES  Performed By: Julee Harrison RDCS, LILIAT     BSA: 2.0 m2  Height: 67 in  Weight: 201 lb  HR: 105  BP: 134/98 mmHg  ______________________________________________________________________________  Procedure  Complete Portable Echo Adult.  ______________________________________________________________________________  Interpretation Summary  Global and regional left ventricular function is normal with an EF of 55-60%.  The right ventricle is normal size. Global right ventricular function is  normal.  The inferior vena cava was normal in size with preserved respiratory  variability.  No pericardial effusion is present.     This study was compared with the study from 2020. No significant changes  noted.  ______________________________________________________________________________  Left Ventricle  Global and regional left ventricular function is normal with an EF of 55-60%.  Mild concentric wall thickening consistent with left ventricular hypertrophy  is present. Diastolic function not assessed due to tachycardia.     Right Ventricle  The right ventricle is normal size. Global right ventricular function is  normal.     Atria  Both atria appear normal.     Mitral Valve  Mild mitral annular  calcification is present. Trace mitral insufficiency is  present.     Aortic Valve  The aortic valve is tricuspid. On Doppler interrogation, there is no  significant stenosis or regurgitation.     Tricuspid Valve  The tricuspid valve is normal. Trace tricuspid insufficiency is present. The  right ventricular systolic pressure is approximated at 25.4 mmHg plus the  right atrial pressure. Pulmonary artery systolic pressure is normal.     Pulmonic Valve  The pulmonic valve is normal. Trace pulmonic insufficiency is present.     Vessels  The aorta root is normal. The thoracic aorta is normal. The inferior vena cava  was normal in size with preserved respiratory variability.     Pericardium  No pericardial effusion is present.     Compared to Previous Study  This study was compared with the study from 2020 . No significant changes  noted.     ______________________________________________________________________________  MMode/2D Measurements & Calculations  IVSd: 1.4 cm  LVIDd: 3.5 cm  LVIDs: 2.3 cm  LVPWd: 1.1 cm  FS: 34.6 %     LV mass(C)d: 148.3 grams  LV mass(C)dI: 73.2 grams/m2  Ao root diam: 3.1 cm  asc Aorta Diam: 3.4 cm  LVOT diam: 1.9 cm  LVOT area: 2.8 cm2  LA Volume (BP): 62.6 ml  LA Volume Index (BP): 30.8 ml/m2  RWT: 0.65     Doppler Measurements & Calculations  MV E max yash: 107.0 cm/sec  MV A max yash: 150.0 cm/sec  MV E/A: 0.71     MV dec slope: 562.0 cm/sec2  MV dec time: 0.19 sec  Ao V2 max: 139.0 cm/sec  Ao max P.0 mmHg  KELSI(V,D): 2.5 cm2  LV V1 max P.1 mmHg  LV V1 max: 123.0 cm/sec  LV V1 VTI: 22.2 cm  SV(LVOT): 62.9 ml  SI(LVOT): 31.1 ml/m2  TR max yash: 252.0 cm/sec  TR max P.4 mmHg  AV Yash Ratio (DI): 0.88  E/E' av.2  Lateral E/e': 10.4  Medial E/e': 14.0     ______________________________________________________________________________  Report approved by: MD Deshaun Appiah 06/15/2021 02:56 PM         Procalcitonin   Result Value Ref Range     Procalcitonin 0.97 ng/ml       Medications   sodium chloride 0.9% infusion ( Intravenous Not Given 6/15/21 1612)   buPROPion (WELLBUTRIN XL) 24 hr tablet 300 mg (has no administration in time range)   DULoxetine (CYMBALTA) DR capsule 60 mg (has no administration in time range)   lidocaine 1 % 0.1-1 mL (has no administration in time range)   lidocaine (LMX4) cream (has no administration in time range)   sodium chloride (PF) 0.9% PF flush 3 mL (has no administration in time range)   sodium chloride (PF) 0.9% PF flush 3 mL (has no administration in time range)   enoxaparin ANTICOAGULANT (LOVENOX) injection 40 mg (40 mg Subcutaneous Given 6/15/21 1950)   acetaminophen (TYLENOL) tablet 650 mg (has no administration in time range)   senna-docusate (SENOKOT-S/PERICOLACE) 8.6-50 MG per tablet 1 tablet (1 tablet Oral Given 6/15/21 1950)     Or   senna-docusate (SENOKOT-S/PERICOLACE) 8.6-50 MG per tablet 2 tablet ( Oral See Alternative 6/15/21 1950)   polyethylene glycol (MIRALAX) Packet 17 g (has no administration in time range)   ondansetron (ZOFRAN-ODT) ODT tab 4 mg (has no administration in time range)     Or   ondansetron (ZOFRAN) injection 4 mg (has no administration in time range)   prochlorperazine (COMPAZINE) injection 10 mg (has no administration in time range)     Or   prochlorperazine (COMPAZINE) tablet 10 mg (has no administration in time range)     Or   prochlorperazine (COMPAZINE) suppository 25 mg (has no administration in time range)   nicotine Patch in Place ( Transdermal Patch in Place 6/15/21 2003)   nicotine (NICODERM CQ) 21 MG/24HR 24 hr patch 1 patch (has no administration in time range)   piperacillin-tazobactam (ZOSYN) intermittent infusion 4.5 g (4.5 g Intravenous New Bag 6/15/21 1950)   albuterol (PROVENTIL) neb solution 2.5 mg (has no administration in time range)   guaiFENesin-dextromethorphan (ROBITUSSIN DM) 100-10 MG/5ML syrup 10 mL (has no administration in time range)   vancomycin 1500 mg in  0.9% NaCl 500 ml intermittent infusion 1,500 mg (has no administration in time range)   pantoprazole (PROTONIX) EC tablet 40 mg (40 mg Oral Given 6/15/21 1950)   LORazepam (ATIVAN) injection 0.5 mg (0.5 mg Intravenous Given 6/15/21 1217)   aspirin (ASA) chewable tablet 324 mg (324 mg Oral Given 6/15/21 1219)   iopamidol (ISOVUE-370) solution 84 mL (84 mLs Intravenous Given 6/15/21 1238)   cefTRIAXone in d5w (ROCEPHIN) intermittent infusion 1 g (0 g Intravenous Stopped 6/15/21 1452)   furosemide (LASIX) injection 40 mg (40 mg Intravenous Given 6/15/21 1419)   vancomycin 1500 mg in 0.9% NaCl 500 ml intermittent infusion 1,500 mg (1,500 mg Intravenous Given 6/15/21 1639)       Assessments & Plan (with Medical Decision Making)     I have reviewed the nursing notes.    I have reviewed the findings, diagnosis, plan and need for follow up with the patient.       ED to Inpatient Handoff:    Discussed with Dr. Adams at [facility]  Patient accepted for Inpatient Stay  Pending studies include blood cultures, UC.   Code Status: Not Addressed           New Prescriptions    No medications on file       Final diagnoses:   Community acquired bacterial pneumonia   COULTER (dyspnea on exertion)   Hypoxia   Elevated troponin     Afebrile.  Vital signs stable but noted initial hypoxia with SaO2 88% on room air.  This quickly jumped up to 92-95% on 3 L nasal cannula.  Patient complaining of worsening shortness of breath for the past 2 months especially over the past couple of days.  Noted worsening with exertion.  IV established.   EKG shows sinus tachycardia.  Cannot rule out inferior infarct, age undetermined.  Heart rate is 107.  This essentially unchanged from previous EKG on 5/2/2017 other than the tachycardia.  His initial troponin returns elevated at 140.8.  He was given oral aspirin.  CBC shows normal white blood cells no left shift.  His hemoglobin is 8.3.  CMP shows slightly elevated alk phos at 135 and AST 58 however ALT remains normal.   ABG shows a pH of 7.50, PCO2 is 32, PO2 is 63, HCO3 is 25, his SaO2 is 91% with an FiO2 of 34.  Hypoxia.  His D-dimer returns elevated 2.4.  BNP is elevated as well at 316.  His CRP is elevated 317.9 with a ESR of 109.  Chest x-ray shows Finding suggests multifocal pneumonia or other acute pneumonitis.  Given his elevated D-dimer and hypoxia a CT PE study was performed and this shows No pulmonary embolus.   Diffuse bilateral groundglass opacities, probably due to multifocal pneumonia. Pulmonary edema is also possible.  The lymph node enlargement which may be reactive related to lung changes.   Hiatal hernia.. He was given Rocephin initially as well as Lasix.  His 90-minute troponin returns elevated at 182.1.  I discussed this case with Dr. Adams and the patient received a 3-hour troponin which remained stable at 182.1.  Patient was started on vancomycin as well as Zosyn.  He will be admitted at this time for further medical management and evaluation.  His Covid returns negative.    6/15/2021   [facility]     [facility] HospitalJb [place] PA-C  06/15/21 2036

## 2021-06-15 NOTE — PHARMACY-VANCOMYCIN DOSING SERVICE
"Pharmacy Vancomycin Initial Note  Date of Service Nancy 15, 2021  Patient's  1960  60 year old, male    Indication: Community Acquired Pneumonia    Current estimated CrCl = Estimated Creatinine Clearance: 112.7 mL/min (based on SCr of 0.77 mg/dL).    Creatinine for last 3 days  6/15/2021: 11:25 AM Creatinine 0.77 mg/dL    Recent Vancomycin Level(s) for last 3 days  No results found for requested labs within last 72 hours.      Vancomycin IV Administrations (past 72 hours)                   vancomycin 1500 mg in 0.9% NaCl 500 ml intermittent infusion 1,500 mg (mg) 1,500 mg Given 06/15/21 1639                Nephrotoxins and other renal medications (From now, onward)    Start     Dose/Rate Route Frequency Ordered Stop    21 0400  vancomycin 1500 mg in 0.9% NaCl 500 ml intermittent infusion 1,500 mg      1,500 mg  over 90 Minutes Intravenous EVERY 12 HOURS 06/15/21 1728 21 1559    06/15/21 1620  vancomycin 1500 mg in 0.9% NaCl 500 ml intermittent infusion 1,500 mg      1,500 mg  over 90 Minutes Intravenous ONCE 06/15/21 1617      06/15/21 1610  piperacillin-tazobactam (ZOSYN) infusion 3.375 g     Note to Pharmacy: For [facility], [facility] and [facility]: For Zosyn-naive patients, use the \"Zosyn initial dose + extended infusion\" order panel.    3.375 g  100 mL/hr over 30 Minutes Intravenous EVERY 8 HOURS 06/15/21 1608            Contrast Orders - past 72 hours (72h ago, onward)    Start     Dose/Rate Route Frequency Ordered Stop    06/15/21 1225  iopamidol (ISOVUE-370) solution 84 mL      84 mL Intravenous ONCE 06/15/21 1221 06/15/21 1238          Loading dose: 1500 mg at 16:39 06/15/2021.  Regimen: 1500 mg every 12 hours for 8 doses. (Stop at 4 doses per 48 hours)  Start time: 17:27 on 06/15/2021  Exposure target: AUC24 (range)400-600 mg/L.hr   AUC24,ss: 603 mg/L.hr at 8 doses, will stop at 4 doses and reassess    PAUC*: 87 %  Ctrough,ss: 18.5 mg/L  Pconc*: 44 %  Tox.: 15 %    Goal Trough:  to 600  Mg/L. hr   "      Plan:  1. Vancomycin 1500 mg given x1 in ED. Then, vancomycin  1500 mg IV q 12 h for 3 more doses (total 4 doses for 48 hours of therapy.   2. Vancomycin monitoring method: AUC  3. Vancomycin therapeutic monitoring goal: 400-600 mg*h/L  4. Pharmacy will check vancomycin levels as appropriate in 1-3 Days, if continued past 48 hours and/or reduce dose..    5. Serum creatinine levels will be ordered daily for the first week of therapy and at least twice weekly for subsequent weeks.        Lisa Gillespie RPH on 6/15/2021 at 5:50 PM

## 2021-06-15 NOTE — PHARMACY-ADMISSION MEDICATION HISTORY
Pharmacy -- Admission Medication Reconciliation    Prior to admission (PTA) medications were reviewed and the patient's PTA medication list was updated.    Sources Consulted: Sure Scripts, Chart notes, [place] [facility]    The reliability of this Medication Reconciliation is: Reliability: Reliable    The following significant changes were made:  Updated last doses  Reentered diclofenac updated directions  Removed scheduled miralax  Added prn trazodone  Added prn oxycodone    In addition, the patient's allergies were reviewed with the patient and updated as follows:   Allergies: Hydrocodone     Medication barriers identified: None noted, Trevor Davila coordinates   Medication adherence concerns: None noted, Trevor Davila coordinates   Understanding of emergency medications: no naloxone, Trevor Davila would coordinate.    Lisa Gillespie [facility], 6/15/2021,  4:37 PM

## 2021-06-15 NOTE — ED TRIAGE NOTES
"EMS Arrival Note  ________________________________  Augie De Jesus is a 60 year old Male that arrives via [facility] ambulance service from [place]  Pre hospital clinical presentation per EMS personnel includes pt has been feeling SOB for approx. 2 months, but the last few days have been worse.  Pt has a procedure oredered to check for a bleeding ulcer as his HMG is \"on the low side\"  Pt resides at [place].  Pt denies chest or abdominal pain.  Pt does feel more SOB with exertion.  Pre hospital personnel report vital signs of:  B/P 132/81; ;SpO2 87-88% on RA EMS ut 3L NC on to achieve 92-93%.    Pre Hospital Cardiac rhythm reported as Tachycardia    Patient arrives with:  GCS Eye Opening = 4=Spontaneous  Airway intact  Breathing Assessment Normal  Circulation Assessment Normal  Patient arrives with a 18 gauge IV at his right anticubital   Placed in room 902, gowned, warm blanket provided, side rails up,  ID verified and band placed, and call light within reach.       Previous living situation [facility]    "

## 2021-06-16 ENCOUNTER — APPOINTMENT (OUTPATIENT)
Dept: GENERAL RADIOLOGY | Facility: OTHER | Age: 61
End: 2021-06-16
Attending: INTERNAL MEDICINE
Payer: COMMERCIAL

## 2021-06-16 PROBLEM — R09.02 HYPOXIA: Status: RESOLVED | Noted: 2021-06-15 | Resolved: 2021-06-16

## 2021-06-16 PROBLEM — R06.09 DOE (DYSPNEA ON EXERTION): Status: RESOLVED | Noted: 2021-06-15 | Resolved: 2021-06-16

## 2021-06-16 PROBLEM — J96.90 RESPIRATORY FAILURE (H): Status: RESOLVED | Noted: 2021-06-15 | Resolved: 2021-06-16

## 2021-06-16 LAB
ANION GAP SERPL CALCULATED.3IONS-SCNC: 13 MMOL/L (ref 3–14)
ANION GAP SERPL CALCULATED.3IONS-SCNC: 14 MMOL/L (ref 3–14)
BUN SERPL-MCNC: 10 MG/DL (ref 7–25)
BUN SERPL-MCNC: 12 MG/DL (ref 7–25)
C PNEUM DNA SPEC QL NAA+PROBE: NOT DETECTED
CALCIUM SERPL-MCNC: 7.9 MG/DL (ref 8.6–10.3)
CALCIUM SERPL-MCNC: 8.1 MG/DL (ref 8.6–10.3)
CHLORIDE SERPL-SCNC: 96 MMOL/L (ref 98–107)
CHLORIDE SERPL-SCNC: 97 MMOL/L (ref 98–107)
CO2 SERPL-SCNC: 23 MMOL/L (ref 21–31)
CO2 SERPL-SCNC: 25 MMOL/L (ref 21–31)
CREAT SERPL-MCNC: 0.8 MG/DL (ref 0.7–1.3)
CREAT SERPL-MCNC: 0.86 MG/DL (ref 0.7–1.3)
ERYTHROCYTE [DISTWIDTH] IN BLOOD BY AUTOMATED COUNT: 22.1 % (ref 10–15)
FLUAV H1 2009 PAND RNA SPEC QL NAA+PROBE: NOT DETECTED
FLUAV H1 RNA SPEC QL NAA+PROBE: NOT DETECTED
FLUAV H3 RNA SPEC QL NAA+PROBE: NOT DETECTED
FLUAV RNA SPEC QL NAA+PROBE: NOT DETECTED
FLUBV RNA SPEC QL NAA+PROBE: NOT DETECTED
GFR SERPL CREATININE-BSD FRML MDRD: >90 ML/MIN/{1.73_M2}
GFR SERPL CREATININE-BSD FRML MDRD: >90 ML/MIN/{1.73_M2}
GLUCOSE SERPL-MCNC: 106 MG/DL (ref 70–105)
GLUCOSE SERPL-MCNC: 98 MG/DL (ref 70–105)
GRAM STN SPEC: ABNORMAL
HADV DNA SPEC QL NAA+PROBE: NOT DETECTED
HCOV PNL SPEC NAA+PROBE: NOT DETECTED
HCT VFR BLD AUTO: 24.6 % (ref 40–53)
HGB BLD-MCNC: 7.5 G/DL (ref 13.3–17.7)
HGB BLD-MCNC: 8.9 G/DL (ref 13.3–17.7)
HMPV RNA SPEC QL NAA+PROBE: NOT DETECTED
HPIV1 RNA SPEC QL NAA+PROBE: NOT DETECTED
HPIV2 RNA SPEC QL NAA+PROBE: NOT DETECTED
HPIV3 RNA SPEC QL NAA+PROBE: NOT DETECTED
HPIV4 RNA SPEC QL NAA+PROBE: NOT DETECTED
L PNEUMO1 AG UR QL IA: NORMAL
LACTATE BLD-SCNC: 1.3 MMOL/L (ref 0.7–2)
LDH SERPL L TO P-CCNC: 527 U/L (ref 140–271)
M PNEUMO DNA SPEC QL NAA+PROBE: NOT DETECTED
MAGNESIUM SERPL-MCNC: 1.3 MG/DL (ref 1.9–2.7)
MCH RBC QN AUTO: 21.1 PG (ref 26.5–33)
MCHC RBC AUTO-ENTMCNC: 30.5 G/DL (ref 31.5–36.5)
MCV RBC AUTO: 69 FL (ref 78–100)
MICROBIOLOGIST REVIEW: NORMAL
PLATELET # BLD AUTO: 285 10E9/L (ref 150–450)
POTASSIUM SERPL-SCNC: 3.3 MMOL/L (ref 3.5–5.1)
POTASSIUM SERPL-SCNC: 3.4 MMOL/L (ref 3.5–5.1)
POTASSIUM SERPL-SCNC: 4 MMOL/L (ref 3.5–5.1)
RBC # BLD AUTO: 3.55 10E12/L (ref 4.4–5.9)
RSV RNA SPEC QL NAA+PROBE: NOT DETECTED
RSV RNA SPEC QL NAA+PROBE: NOT DETECTED
RV+EV RNA SPEC QL NAA+PROBE: NOT DETECTED
S PNEUM AG SPEC QL: NORMAL
SODIUM SERPL-SCNC: 134 MMOL/L (ref 134–144)
SODIUM SERPL-SCNC: 134 MMOL/L (ref 134–144)
SPECIMEN SOURCE: ABNORMAL
SPECIMEN SOURCE: NORMAL
SPECIMEN SOURCE: NORMAL
TROPONIN I SERPL-MCNC: 257.7 PG/ML
TROPONIN I SERPL-MCNC: 261.8 PG/ML
WBC # BLD AUTO: 12.1 10E9/L (ref 4–11)

## 2021-06-16 PROCEDURE — 250N000009 HC RX 250: Performed by: INTERNAL MEDICINE

## 2021-06-16 PROCEDURE — 71045 X-RAY EXAM CHEST 1 VIEW: CPT

## 2021-06-16 PROCEDURE — 250N000013 HC RX MED GY IP 250 OP 250 PS 637: Performed by: INTERNAL MEDICINE

## 2021-06-16 PROCEDURE — 87486 CHLMYD PNEUM DNA AMP PROBE: CPT | Performed by: INTERNAL MEDICINE

## 2021-06-16 PROCEDURE — 94640 AIRWAY INHALATION TREATMENT: CPT | Mod: 76

## 2021-06-16 PROCEDURE — 36415 COLL VENOUS BLD VENIPUNCTURE: CPT | Performed by: INTERNAL MEDICINE

## 2021-06-16 PROCEDURE — 83605 ASSAY OF LACTIC ACID: CPT | Performed by: INTERNAL MEDICINE

## 2021-06-16 PROCEDURE — 85027 COMPLETE CBC AUTOMATED: CPT | Performed by: INTERNAL MEDICINE

## 2021-06-16 PROCEDURE — 84484 ASSAY OF TROPONIN QUANT: CPT | Performed by: INTERNAL MEDICINE

## 2021-06-16 PROCEDURE — 84132 ASSAY OF SERUM POTASSIUM: CPT | Performed by: INTERNAL MEDICINE

## 2021-06-16 PROCEDURE — 85018 HEMOGLOBIN: CPT | Performed by: INTERNAL MEDICINE

## 2021-06-16 PROCEDURE — 87633 RESP VIRUS 12-25 TARGETS: CPT | Performed by: INTERNAL MEDICINE

## 2021-06-16 PROCEDURE — 200N000001 HC R&B ICU

## 2021-06-16 PROCEDURE — 250N000011 HC RX IP 250 OP 636: Performed by: INTERNAL MEDICINE

## 2021-06-16 PROCEDURE — 99291 CRITICAL CARE FIRST HOUR: CPT | Performed by: INTERNAL MEDICINE

## 2021-06-16 PROCEDURE — 258N000003 HC RX IP 258 OP 636: Performed by: INTERNAL MEDICINE

## 2021-06-16 PROCEDURE — 999N000157 HC STATISTIC RCP TIME EA 10 MIN

## 2021-06-16 PROCEDURE — 83615 LACTATE (LD) (LDH) ENZYME: CPT | Performed by: INTERNAL MEDICINE

## 2021-06-16 PROCEDURE — 5A0945A ASSISTANCE WITH RESPIRATORY VENTILATION, 24-96 CONSECUTIVE HOURS, HIGH NASAL FLOW/VELOCITY: ICD-10-PCS | Performed by: INTERNAL MEDICINE

## 2021-06-16 PROCEDURE — 83735 ASSAY OF MAGNESIUM: CPT | Performed by: INTERNAL MEDICINE

## 2021-06-16 PROCEDURE — 87581 M.PNEUMON DNA AMP PROBE: CPT | Performed by: INTERNAL MEDICINE

## 2021-06-16 PROCEDURE — 94640 AIRWAY INHALATION TREATMENT: CPT

## 2021-06-16 PROCEDURE — 80048 BASIC METABOLIC PNL TOTAL CA: CPT | Performed by: INTERNAL MEDICINE

## 2021-06-16 RX ORDER — FUROSEMIDE 10 MG/ML
40 INJECTION INTRAMUSCULAR; INTRAVENOUS EVERY 8 HOURS
Status: DISCONTINUED | OUTPATIENT
Start: 2021-06-16 | End: 2021-06-17

## 2021-06-16 RX ORDER — MAGNESIUM OXIDE 400 MG/1
400 TABLET ORAL 3 TIMES DAILY
Status: COMPLETED | OUTPATIENT
Start: 2021-06-16 | End: 2021-06-17

## 2021-06-16 RX ORDER — POTASSIUM CHLORIDE 1500 MG/1
40 TABLET, EXTENDED RELEASE ORAL ONCE
Status: COMPLETED | OUTPATIENT
Start: 2021-06-16 | End: 2021-06-16

## 2021-06-16 RX ORDER — FUROSEMIDE 10 MG/ML
20 INJECTION INTRAMUSCULAR; INTRAVENOUS ONCE
Status: COMPLETED | OUTPATIENT
Start: 2021-06-16 | End: 2021-06-16

## 2021-06-16 RX ORDER — AZITHROMYCIN 500 MG/5ML
500 INJECTION, POWDER, LYOPHILIZED, FOR SOLUTION INTRAVENOUS DAILY
Status: DISCONTINUED | OUTPATIENT
Start: 2021-06-16 | End: 2021-06-19 | Stop reason: HOSPADM

## 2021-06-16 RX ADMIN — VANCOMYCIN HYDROCHLORIDE 1000 MG: 1 INJECTION, POWDER, LYOPHILIZED, FOR SOLUTION INTRAVENOUS at 15:57

## 2021-06-16 RX ADMIN — FUROSEMIDE 20 MG: 10 INJECTION, SOLUTION INTRAMUSCULAR; INTRAVENOUS at 00:19

## 2021-06-16 RX ADMIN — MAGNESIUM OXIDE TAB 400 MG (241.3 MG ELEMENTAL MG) 400 MG: 400 (241.3 MG) TAB at 21:56

## 2021-06-16 RX ADMIN — TAZOBACTAM SODIUM AND PIPERACILLIN SODIUM 4.5 G: 500; 4 INJECTION, SOLUTION INTRAVENOUS at 19:48

## 2021-06-16 RX ADMIN — MAGNESIUM OXIDE TAB 400 MG (241.3 MG ELEMENTAL MG) 400 MG: 400 (241.3 MG) TAB at 08:12

## 2021-06-16 RX ADMIN — FUROSEMIDE 40 MG: 10 INJECTION, SOLUTION INTRAVENOUS at 23:18

## 2021-06-16 RX ADMIN — BUPROPION HYDROCHLORIDE 300 MG: 150 TABLET, FILM COATED, EXTENDED RELEASE ORAL at 08:51

## 2021-06-16 RX ADMIN — PANTOPRAZOLE SODIUM 40 MG: 40 TABLET, DELAYED RELEASE ORAL at 19:48

## 2021-06-16 RX ADMIN — VANCOMYCIN HYDROCHLORIDE 1500 MG: 500 INJECTION, POWDER, LYOPHILIZED, FOR SOLUTION INTRAVENOUS at 03:16

## 2021-06-16 RX ADMIN — TAZOBACTAM SODIUM AND PIPERACILLIN SODIUM 4.5 G: 500; 4 INJECTION, SOLUTION INTRAVENOUS at 13:59

## 2021-06-16 RX ADMIN — ALBUTEROL SULFATE 2.5 MG: 2.5 SOLUTION RESPIRATORY (INHALATION) at 14:59

## 2021-06-16 RX ADMIN — POTASSIUM CHLORIDE 40 MEQ: 1500 TABLET, EXTENDED RELEASE ORAL at 08:12

## 2021-06-16 RX ADMIN — FUROSEMIDE 40 MG: 10 INJECTION, SOLUTION INTRAVENOUS at 15:51

## 2021-06-16 RX ADMIN — TAZOBACTAM SODIUM AND PIPERACILLIN SODIUM 4.5 G: 500; 4 INJECTION, SOLUTION INTRAVENOUS at 01:45

## 2021-06-16 RX ADMIN — DOCUSATE SODIUM 50 MG AND SENNOSIDES 8.6 MG 1 TABLET: 8.6; 5 TABLET, FILM COATED ORAL at 21:56

## 2021-06-16 RX ADMIN — DOCUSATE SODIUM 50 MG AND SENNOSIDES 8.6 MG 1 TABLET: 8.6; 5 TABLET, FILM COATED ORAL at 09:53

## 2021-06-16 RX ADMIN — MAGNESIUM OXIDE TAB 400 MG (241.3 MG ELEMENTAL MG) 400 MG: 400 (241.3 MG) TAB at 13:57

## 2021-06-16 RX ADMIN — AZITHROMYCIN MONOHYDRATE 500 MG: 500 INJECTION, POWDER, LYOPHILIZED, FOR SOLUTION INTRAVENOUS at 09:55

## 2021-06-16 RX ADMIN — DULOXETINE HYDROCHLORIDE 60 MG: 30 CAPSULE, DELAYED RELEASE ORAL at 09:54

## 2021-06-16 RX ADMIN — ALBUTEROL SULFATE 2.5 MG: 2.5 SOLUTION RESPIRATORY (INHALATION) at 18:53

## 2021-06-16 RX ADMIN — FUROSEMIDE 40 MG: 10 INJECTION, SOLUTION INTRAVENOUS at 08:26

## 2021-06-16 RX ADMIN — POTASSIUM CHLORIDE 40 MEQ: 1500 TABLET, EXTENDED RELEASE ORAL at 03:16

## 2021-06-16 RX ADMIN — ALBUTEROL SULFATE 2.5 MG: 2.5 SOLUTION RESPIRATORY (INHALATION) at 10:40

## 2021-06-16 RX ADMIN — ALBUTEROL SULFATE 2.5 MG: 2.5 SOLUTION RESPIRATORY (INHALATION) at 06:20

## 2021-06-16 RX ADMIN — TAZOBACTAM SODIUM AND PIPERACILLIN SODIUM 4.5 G: 500; 4 INJECTION, SOLUTION INTRAVENOUS at 08:29

## 2021-06-16 ASSESSMENT — ACTIVITIES OF DAILY LIVING (ADL)
ADLS_ACUITY_SCORE: 18

## 2021-06-16 ASSESSMENT — MIFFLIN-ST. JEOR: SCORE: 1671.63

## 2021-06-16 NOTE — PLAN OF CARE
Lungs crackles through, dyspnea, abdominal muscle use, productive cough.  Oxygen at 4 LPM at start of night.  Unable to maintain oxygen saturation- decreased into 70-80's with movement in bed.  Increased oxygen to maintain sats and administered ativan and Dr. Adams/RT consulted.  Orders per Dr. Adams to transfer to ICU if patient unable to maintain saturation on 10 LPM via high flow, orders to check BMP and give lasix 20mg IVP, encourage CPAP.  200cc urine out after lasix.  Continues to dip to 70-80's when moving in bed or using urinal.  Patient transferred to ICU and started on vapotherm.  Teressa Mcelroy RN on 6/16/2021 at 5:23 AM

## 2021-06-16 NOTE — PROGRESS NOTES
SAFETY CHECKLIST  ID Bands and Risk clasps correct and in place (DNR, Fall risk, Allergy, Latex, Limb):  Yes  All Lines Reconciled and labeled correctly: Yes  Whiteboard updated:Yes  Environmental interventions (bed/chair alarm on, call light, side rails, restraints, sitter....): Yes  Verify Tele #: [id number]

## 2021-06-16 NOTE — PHARMACY-PHARMACOTHERAPY NOTE
"Pharmacy Vancomycin Initial Note  Date of Service 2021  Patient's  1960  60 year old, male    Indication: Healthcare-Associated Pneumonia    Current estimated CrCl = Estimated Creatinine Clearance: 99.9 mL/min (based on SCr of 0.86 mg/dL).    Creatinine for last 3 days  6/15/2021: 11:25 AM Creatinine 0.77 mg/dL  2021: 12:20 AM Creatinine 0.80 mg/dL;  5:00 AM Creatinine 0.86 mg/dL    Recent Vancomycin Level(s) for last 3 days  No results found for requested labs within last 72 hours.      Vancomycin IV Administrations (past 72 hours)                   vancomycin 1500 mg in 0.9% NaCl 500 ml intermittent infusion 1,500 mg (mg) 1,500 mg Given 21 0316    vancomycin 1500 mg in 0.9% NaCl 500 ml intermittent infusion 1,500 mg (mg) 1,500 mg Given 06/15/21 1639                Nephrotoxins and other renal medications (From now, onward)    Start     Dose/Rate Route Frequency Ordered Stop    21 1600  vancomycin (VANCOCIN) 1,000 mg in sodium chloride 0.9 % 250 mL intermittent infusion      1,000 mg  over 90 Minutes Intravenous EVERY 12 HOURS 21 1325 21 1559    21 0800  furosemide (LASIX) injection 40 mg     Note to Pharmacy: DO NOT USE THIS FIELD FOR ADMIN INSTRUCTIONS; INFORMATION DOES NOT SHOW ON MAR. USE THE FIELD ABOVE MARKED ADMIN INSTRUCTIONS    40 mg  over 1-3 Minutes Intravenous EVERY 8 HOURS 21 0754      06/15/21 1800  piperacillin-tazobactam (ZOSYN) intermittent infusion 4.5 g     Note to Pharmacy: For [facility], [facility] and [facility]: For Zosyn-naive patients, use the \"Zosyn initial dose + extended infusion\" order panel.    4.5 g  200 mL/hr over 30 Minutes Intravenous EVERY 6 HOURS 06/15/21 1800            Contrast Orders - past 72 hours (72h ago, onward)    Start     Dose/Rate Route Frequency Ordered Stop    06/15/21 1225  iopamidol (ISOVUE-370) solution 84 mL      84 mL Intravenous ONCE 06/15/21 1221 06/15/21 1238                  Plan:  1. Patient received a Vancomycin load " of 1500 mg x1 at 1639 on 6/15, then another 1500 mg around 0300 on 6/16. Due to increased SCr, will reduce dose to 1000 mg IV Q12H for remaining 2 doses in 48 hour empiric period to avoid exceeded an AUC of 600.  2. Vancomycin monitoring method: AUC  3. Vancomycin therapeutic monitoring goal: 400-600 mg*h/L  4. Pharmacy will check vancomycin levels as appropriate in 1-3 Days.    5. Serum creatinine levels will be ordered daily for the first week of therapy and at least twice weekly for subsequent weeks.      Kolby Hernandes, [facility]

## 2021-06-16 NOTE — PROGRESS NOTES
SAFETY CHECKLIST  ID Bands and Risk clasps correct and in place (DNR, Fall risk, Allergy, Latex, Limb):  Yes  All Lines Reconciled and labeled correctly: Yes  Whiteboard updated:Yes  Environmental interventions (bed/chair alarm on, call light, side rails, restraints, sitter....): Yes  Verify Tele #:   Patient independently changing position from lying in bed to sitting at bedside. Helped order breakfast. Denies any pain. Reports SOB with any activity. Slight lightheadedness reported. Will continue to monitor.

## 2021-06-16 NOTE — PROGRESS NOTES
Pt reports feeling less SOB. Improvement in LS. Sat drops to mid 80s with movement to bedside to une urinal. Vapo running 40L at 60%. Hgb 7.5.

## 2021-06-16 NOTE — PLAN OF CARE
Lung sounds clear throughout. Reports SOB with activity. Lightheadedness with movement. Reports dry occasional cough. Denies any pain. Respiratory therapy able to wean patient down to 35L at 35% FiO2 via vapotherm. Adequate clear yellow urine output. Using urinal at bedside. Will continue to monitor.

## 2021-06-16 NOTE — PROGRESS NOTES
[facility]    Hospitalist Progress Note      Assessment & Plan   Augie De Jesus is a 60 year old male who was admitted on 6/15/2021.     Principal Problem:    Acute respiratory failure with hypoxia (H)    Assessment: Increasing oxygen requirement overnight requiring initiation of Vapotherm, differential remains includes bacterial pneumonia given initial productive cough, and elevated inflammatory markers CT findings and heavy history of alcohol abuse with last drink a week ago aspiration certainly could play a role, less likely to clinically have pulmonary edema contributing but seems to have improvement with gentle diuresis.  Benefit from histo/blasto testing given the relative chronicity of his symptoms, no evidence of PE, COPD or ACS playing a role.  Covid negative.  Vlad & Vlad vaccine 1 week ago.    Plan:   -IV vancomycin/Zosyn day 2  -Add azithromycin day 1  -Check respiratory viral panel  -Follow-up blood cultures  -Repeat sputum culture as initial was contamination   -Follow-up urine strep and Legionella  -Scheduled nebs  -Trend pro calcitonin  -Follow-up histo/blasto urine and blood antigens  -Increase Lasix to 40 IV every 8  -Wean Vapotherm as able  -Daily chest x-ray     Active Problems:    Hypertension    Assessment: Chronic and stable, not actively treated    Plan:   -monitor     Anemia  Assessment: Chronic and thought to be due to chronic GI losses, probable iron deficiency and chronic disease component in addition, no clear evidence of hemolysis.  No IV iron given active infection, given severe inflammation oral iron unlikely to be of benefit.  Plan:  -Continue PPI  -Avoid NSAIDs       YARELIS (obstructive sleep apnea)    Assessment: Chronic, not consistently compliant with CPAP given claustrophobia    Plan:   -Appreciate RCAT for CPAP set up       Alcohol abuse    Assessment: Per report last drink was about a week ago, no withdrawal symptoms    Plan:   -Monitor for evidence  of withdrawal and start CIWA if needed       Elevated troponin    Assessment: Now peaked with no further escalation troponin, no new wall motion abnormalities on TTE and no new EKG findings consistent cardiopulmonary symptoms concerning for ACS.  More likely related to hypoxia and supply demand mismatch.    Plan:   -Trend  -Telemetry    Diet: Regular Diet Adult    DVT Prophylaxis: Pneumatic Compression Devices  Ruiz Catheter: not present  Code Status: Full Code           Disposition Plan   Expected discharge: 4 - 7 days, recommended to prior living arrangement once adequate pain management/ tolerating PO medications, antibiotic plan established, hemoglobin stable, mental status at baseline and O2 use less than 4 liters/minute.  Entered: Valdez Adams MD 06/16/2021, 10:05 AM       The patient's care was discussed with the Patient.    Valdez Adams MD  Hospitalist Service  [facility]  Contact information available via [facility] Paging/Directory    ______________________________________________________________________    Interval History   Overnight increasing oxygen requirement requiring transfer to the ICU for Vapotherm initiation.  He does not feel that his breathing is worse than yesterday, cough has lessened and no longer productive with brown/green sputum and scant hemoptysis.  No chest pain, no significant orthopnea, no wheezing, no nausea vomiting or abdominal pain, no increased lower extremity edema, no other new complaints.    -Data reviewed today: I reviewed all new labs and imaging results over the last 24 hours. I personally reviewed the chest x-ray image(s) showing Progressive bilateral interstitial opacities..    Physical Exam   Temp: 98.3  F (36.8  C) Temp src: Temporal BP: (!) 147/92 Pulse: 106   Resp: 28 SpO2: 92 % O2 Device: High Flow Nasal Cannula (HFNC)(vapotherm) Oxygen Delivery: 40 LPM  Vitals:    06/15/21 1104 06/16/21 0400   Weight: 91.3 kg (201 lb 4.5 oz) 89.3 kg (196 lb 13.9  oz)     Vital Signs with Ranges  Temp:  [96.1  F (35.6  C)-98.7  F (37.1  C)] 98.3  F (36.8  C)  Pulse:  [] 106  Resp:  [11-36] 28  BP: (119-161)/(69-98) 147/92  FiO2 (%):  [50 %-60 %] 55 %  SpO2:  [74 %-98 %] 92 %  I/O last 3 completed shifts:  In: 1286.67 [I.V.:1286.67]  Out: 2510 [Urine:2510]    Exam:  GENERAL: Talkative, laying in bed, in no apparent distress.  NECK: Supple, jugular venous distension not present.  CARDIOVASCULAR: regular rate and rhythm, no murmurs, rubs, or gallops. Normal S1/S2. No lower extremity edema.   RESPIRATORY:  Scant coarse breath sounds at the bilateral bases, diffuse air movement in all fields, no wheezes, no crackles, no accessory muscle use or evidence of respiratory distress.   GI: soft, non-tender deep palpation in all quadrants, non-distended, normoactive bowel sounds.  MUSCULOSKELETAL: warm and well perfused, 2+ dorsalis pedis pulses.    SKIN: no pallor, jaundice or rashes.  NEUROLOGY: AAOx3, follows commands, speech and language without focal deficits.        Medications       albuterol  3 mL Nebulization 4x Daily     azithromycin  500 mg Intravenous Daily     buPROPion  300 mg Oral QAM     DULoxetine  60 mg Oral Daily     furosemide  40 mg Intravenous Q8H     magnesium oxide  400 mg Oral TID     nicotine   Transdermal Q8H     pantoprazole  40 mg Oral Daily     piperacillin-tazobactam  4.5 g Intravenous Q6H     senna-docusate  1 tablet Oral BID    Or     senna-docusate  2 tablet Oral BID     sodium chloride (PF)  3 mL Intracatheter Q8H     vancomycin (VANCOCIN) IV  1,500 mg Intravenous Q12H       Data   Recent Labs   Lab 06/16/21  0500 06/16/21  0020 06/15/21  1125   WBC 12.1*  --  10.8   HGB 7.5* 8.9* 8.3*   MCV 69*  --  69*     --  313    134 133*   POTASSIUM 3.4* 3.3* 3.3*   CHLORIDE 96* 97* 98   CO2 25 23 20*   BUN 10 12 12   CR 0.86 0.80 0.77   ANIONGAP 13 14 15*   KUMAR 7.9* 8.1* 8.4*   * 98 109*   ALBUMIN  --   --  3.3*   PROTTOTAL  --   --  6.8    BILITOTAL  --   --  0.9   ALKPHOS  --   --  135*   ALT  --   --  38   AST  --   --  58*       Recent Results (from the past 24 hour(s))   XR Chest Port 1 View    Narrative    PROCEDURE:  XR CHEST PORT 1 VIEW    HISTORY: sob. .    COMPARISON:  3/17/2020    FINDINGS:    The cardiomediastinal contours are stable.  Patchy airspace consolidation is present throughout both lower lungs.  No effusion or pneumothorax is identified.      Impression    IMPRESSION:  Finding suggests multifocal pneumonia or other acute  pneumonitis. Recommend follow-up to resolution.      ETHEL MONSALVE MD   CT Chest Pulmonary Embolism w Contrast    Narrative    PROCEDURE: CT CHEST PULMONARY EMBOLISM W CONTRAST 6/15/2021 12:42 PM    HISTORY: PE suspected, high prob    COMPARISONS: None.    TECHNIQUE: Axial postcontrast enhanced images were obtained with  coronal and sagittal MIP images.    FINDINGS: There is excellent opacification of pulmonary vessels. No  pulmonary embolus is seen.     There aren't enlarged mediastinal lymph nodes including right  paratracheal lymph node that measures up to 1.7 cm in short axis  dimension and AP window node which measures 12 mm in short axis  dimension. Subcarinal lymph node enlargement is seen as well. This is  nonspecific but may be reactive given lung changes.    There are diffuse bilateral lung infiltrates many with groundglass  type density.    There are small bilateral pleural effusions. No pericardial effusion  is seen. Heart is enlarged. There is some coronary artery  calcification. There is a moderate size hiatal hernia.    Limited images through the upper abdomen show no suspicious  abnormality. Degenerative changes seen in the spine.           Impression    IMPRESSION:   1. No pulmonary embolus.  2. Diffuse bilateral groundglass opacities, probably due to multifocal  pneumonia. Pulmonary edema is also possible.  3. The lymph node enlargement which may be reactive related to lung  changes.  4.  Hiatal hernia.    JERRICA RUIZ MD   Echocardiogram Complete    Narrative    [id number]  [id number]  [id number]  [id number]^SACHINDALILA^SANCHO^NATE     [facility]  [address] [place] [address]     Name: CAT GORDON  MRN: [id number]  : 1960  Study Date: 06/15/2021 02:20 PM  Age: 60 yrs  Gender: Male  Patient Location: [facility]  Reason For Study: CHF  Ordering Physician: SANCHO RHOADES  Performed By: Julee Harrison RDCS, RVT     BSA: 2.0 m2  Height: 67 in  Weight: 201 lb  HR: 105  BP: 134/98 mmHg  ______________________________________________________________________________  Procedure  Complete Portable Echo Adult.  ______________________________________________________________________________  Interpretation Summary  Global and regional left ventricular function is normal with an EF of 55-60%.  The right ventricle is normal size. Global right ventricular function is  normal.  The inferior vena cava was normal in size with preserved respiratory  variability.  No pericardial effusion is present.     This study was compared with the study from 2020. No significant changes  noted.  ______________________________________________________________________________  Left Ventricle  Global and regional left ventricular function is normal with an EF of 55-60%.  Mild concentric wall thickening consistent with left ventricular hypertrophy  is present. Diastolic function not assessed due to tachycardia.     Right Ventricle  The right ventricle is normal size. Global right ventricular function is  normal.     Atria  Both atria appear normal.     Mitral Valve  Mild mitral annular calcification is present. Trace mitral insufficiency is  present.     Aortic Valve  The aortic valve is tricuspid. On Doppler interrogation, there is no  significant stenosis or regurgitation.     Tricuspid Valve  The tricuspid valve is normal. Trace tricuspid insufficiency is present. The  right ventricular systolic  pressure is approximated at 25.4 mmHg plus the  right atrial pressure. Pulmonary artery systolic pressure is normal.     Pulmonic Valve  The pulmonic valve is normal. Trace pulmonic insufficiency is present.     Vessels  The aorta root is normal. The thoracic aorta is normal. The inferior vena cava  was normal in size with preserved respiratory variability.     Pericardium  No pericardial effusion is present.     Compared to Previous Study  This study was compared with the study from 2020 . No significant changes  noted.     ______________________________________________________________________________  MMode/2D Measurements & Calculations  IVSd: 1.4 cm  LVIDd: 3.5 cm  LVIDs: 2.3 cm  LVPWd: 1.1 cm  FS: 34.6 %     LV mass(C)d: 148.3 grams  LV mass(C)dI: 73.2 grams/m2  Ao root diam: 3.1 cm  asc Aorta Diam: 3.4 cm  LVOT diam: 1.9 cm  LVOT area: 2.8 cm2  LA Volume (BP): 62.6 ml  LA Volume Index (BP): 30.8 ml/m2  RWT: 0.65     Doppler Measurements & Calculations  MV E max yash: 107.0 cm/sec  MV A max yash: 150.0 cm/sec  MV E/A: 0.71     MV dec slope: 562.0 cm/sec2  MV dec time: 0.19 sec  Ao V2 max: 139.0 cm/sec  Ao max P.0 mmHg  KELSI(V,D): 2.5 cm2  LV V1 max P.1 mmHg  LV V1 max: 123.0 cm/sec  LV V1 VTI: 22.2 cm  SV(LVOT): 62.9 ml  SI(LVOT): 31.1 ml/m2  TR max yash: 252.0 cm/sec  TR max P.4 mmHg  AV Yash Ratio (DI): 0.88  E/E' av.2  Lateral E/e': 10.4  Medial E/e': 14.0     ______________________________________________________________________________  Report approved by: MD Deshaun Appiah 06/15/2021 02:56 PM         XR Chest Port 1 View    Narrative    PROCEDURE:  XR CHEST PORT 1 VIEW    HISTORY:  f/u pneumonia.     COMPARISON:  Nancy 15, 2021    FINDINGS:   The cardiac silhouette is normal in size. Widespread pulmonary  parenchymal opacities are noted. The pulmonary parenchymal opacities  have worsened as compared to Nancy 15. No pleural effusion or  pneumothorax.      Impression     IMPRESSION:  Worsening bilateral pulmonary parenchymal opacities from  Nancy 15, 2021      SANDY ORELLANA MD      I have spent greater than 40 minutes ofcritical care time related to direct patient care, care coordination, and communication exclusive of procedures with focus of care related to hypoxic respiratory failure.

## 2021-06-17 ENCOUNTER — APPOINTMENT (OUTPATIENT)
Dept: GENERAL RADIOLOGY | Facility: OTHER | Age: 61
End: 2021-06-17
Attending: INTERNAL MEDICINE
Payer: COMMERCIAL

## 2021-06-17 LAB
ANION GAP SERPL CALCULATED.3IONS-SCNC: 13 MMOL/L (ref 3–14)
BUN SERPL-MCNC: 12 MG/DL (ref 7–25)
CALCIUM SERPL-MCNC: 8.4 MG/DL (ref 8.6–10.3)
CHLORIDE SERPL-SCNC: 91 MMOL/L (ref 98–107)
CO2 SERPL-SCNC: 28 MMOL/L (ref 21–31)
CREAT SERPL-MCNC: 0.94 MG/DL (ref 0.7–1.3)
CRP SERPL-MCNC: 358.4 MG/L
ERYTHROCYTE [DISTWIDTH] IN BLOOD BY AUTOMATED COUNT: 22.2 % (ref 10–15)
GFR SERPL CREATININE-BSD FRML MDRD: 82 ML/MIN/{1.73_M2}
GLUCOSE SERPL-MCNC: 125 MG/DL (ref 70–105)
HCT VFR BLD AUTO: 26.8 % (ref 40–53)
HGB BLD-MCNC: 8.3 G/DL (ref 13.3–17.7)
LACTATE BLD-SCNC: 1.6 MMOL/L (ref 0.7–2)
MAGNESIUM SERPL-MCNC: 1.7 MG/DL (ref 1.9–2.7)
MCH RBC QN AUTO: 21.2 PG (ref 26.5–33)
MCHC RBC AUTO-ENTMCNC: 31 G/DL (ref 31.5–36.5)
MCV RBC AUTO: 69 FL (ref 78–100)
PLATELET # BLD AUTO: 358 10E9/L (ref 150–450)
POTASSIUM SERPL-SCNC: 3.3 MMOL/L (ref 3.5–5.1)
POTASSIUM SERPL-SCNC: 3.5 MMOL/L (ref 3.5–5.1)
PROCALCITONIN SERPL-MCNC: 3.69 NG/ML
RBC # BLD AUTO: 3.91 10E12/L (ref 4.4–5.9)
SODIUM SERPL-SCNC: 132 MMOL/L (ref 134–144)
VANCOMYCIN SERPL-MCNC: 15.1 UG/ML (ref 7–45)
WBC # BLD AUTO: 11.9 10E9/L (ref 4–11)

## 2021-06-17 PROCEDURE — 83735 ASSAY OF MAGNESIUM: CPT | Performed by: INTERNAL MEDICINE

## 2021-06-17 PROCEDURE — 80048 BASIC METABOLIC PNL TOTAL CA: CPT | Performed by: INTERNAL MEDICINE

## 2021-06-17 PROCEDURE — 85027 COMPLETE CBC AUTOMATED: CPT | Performed by: INTERNAL MEDICINE

## 2021-06-17 PROCEDURE — 250N000009 HC RX 250: Performed by: INTERNAL MEDICINE

## 2021-06-17 PROCEDURE — 36415 COLL VENOUS BLD VENIPUNCTURE: CPT | Performed by: INTERNAL MEDICINE

## 2021-06-17 PROCEDURE — 84132 ASSAY OF SERUM POTASSIUM: CPT | Performed by: INTERNAL MEDICINE

## 2021-06-17 PROCEDURE — 83605 ASSAY OF LACTIC ACID: CPT | Performed by: INTERNAL MEDICINE

## 2021-06-17 PROCEDURE — 84145 PROCALCITONIN (PCT): CPT | Performed by: INTERNAL MEDICINE

## 2021-06-17 PROCEDURE — 80202 ASSAY OF VANCOMYCIN: CPT | Performed by: INTERNAL MEDICINE

## 2021-06-17 PROCEDURE — 250N000013 HC RX MED GY IP 250 OP 250 PS 637: Performed by: INTERNAL MEDICINE

## 2021-06-17 PROCEDURE — 999N000157 HC STATISTIC RCP TIME EA 10 MIN

## 2021-06-17 PROCEDURE — 86140 C-REACTIVE PROTEIN: CPT | Performed by: INTERNAL MEDICINE

## 2021-06-17 PROCEDURE — 200N000001 HC R&B ICU

## 2021-06-17 PROCEDURE — 250N000011 HC RX IP 250 OP 636: Performed by: INTERNAL MEDICINE

## 2021-06-17 PROCEDURE — 94640 AIRWAY INHALATION TREATMENT: CPT | Mod: 76

## 2021-06-17 PROCEDURE — 94640 AIRWAY INHALATION TREATMENT: CPT

## 2021-06-17 PROCEDURE — 258N000003 HC RX IP 258 OP 636: Performed by: INTERNAL MEDICINE

## 2021-06-17 PROCEDURE — 99232 SBSQ HOSP IP/OBS MODERATE 35: CPT | Performed by: INTERNAL MEDICINE

## 2021-06-17 PROCEDURE — 71045 X-RAY EXAM CHEST 1 VIEW: CPT

## 2021-06-17 RX ORDER — POTASSIUM CHLORIDE 1500 MG/1
40 TABLET, EXTENDED RELEASE ORAL ONCE
Status: COMPLETED | OUTPATIENT
Start: 2021-06-17 | End: 2021-06-17

## 2021-06-17 RX ORDER — GUAIFENESIN 600 MG/1
600 TABLET, EXTENDED RELEASE ORAL 2 TIMES DAILY
Status: DISCONTINUED | OUTPATIENT
Start: 2021-06-17 | End: 2021-06-19 | Stop reason: HOSPADM

## 2021-06-17 RX ADMIN — ALBUTEROL SULFATE 2.5 MG: 2.5 SOLUTION RESPIRATORY (INHALATION) at 06:25

## 2021-06-17 RX ADMIN — ALBUTEROL SULFATE 2.5 MG: 2.5 SOLUTION RESPIRATORY (INHALATION) at 14:39

## 2021-06-17 RX ADMIN — DULOXETINE HYDROCHLORIDE 60 MG: 30 CAPSULE, DELAYED RELEASE ORAL at 09:52

## 2021-06-17 RX ADMIN — ALBUTEROL SULFATE 2.5 MG: 2.5 SOLUTION RESPIRATORY (INHALATION) at 18:20

## 2021-06-17 RX ADMIN — LORAZEPAM 1 MG: 0.5 TABLET ORAL at 01:46

## 2021-06-17 RX ADMIN — BUPROPION HYDROCHLORIDE 300 MG: 150 TABLET, FILM COATED, EXTENDED RELEASE ORAL at 08:16

## 2021-06-17 RX ADMIN — MAGNESIUM OXIDE TAB 400 MG (241.3 MG ELEMENTAL MG) 400 MG: 400 (241.3 MG) TAB at 21:58

## 2021-06-17 RX ADMIN — TAZOBACTAM SODIUM AND PIPERACILLIN SODIUM 4.5 G: 500; 4 INJECTION, SOLUTION INTRAVENOUS at 20:07

## 2021-06-17 RX ADMIN — GUAIFENESIN 600 MG: 600 TABLET, EXTENDED RELEASE ORAL at 09:53

## 2021-06-17 RX ADMIN — VANCOMYCIN HYDROCHLORIDE 1000 MG: 1 INJECTION, POWDER, LYOPHILIZED, FOR SOLUTION INTRAVENOUS at 04:14

## 2021-06-17 RX ADMIN — ALBUTEROL SULFATE 2.5 MG: 2.5 SOLUTION RESPIRATORY (INHALATION) at 10:03

## 2021-06-17 RX ADMIN — GUAIFENESIN 600 MG: 600 TABLET, EXTENDED RELEASE ORAL at 21:58

## 2021-06-17 RX ADMIN — AZITHROMYCIN MONOHYDRATE 500 MG: 500 INJECTION, POWDER, LYOPHILIZED, FOR SOLUTION INTRAVENOUS at 09:54

## 2021-06-17 RX ADMIN — TAZOBACTAM SODIUM AND PIPERACILLIN SODIUM 4.5 G: 500; 4 INJECTION, SOLUTION INTRAVENOUS at 01:19

## 2021-06-17 RX ADMIN — TAZOBACTAM SODIUM AND PIPERACILLIN SODIUM 4.5 G: 500; 4 INJECTION, SOLUTION INTRAVENOUS at 08:17

## 2021-06-17 RX ADMIN — MAGNESIUM OXIDE TAB 400 MG (241.3 MG ELEMENTAL MG) 400 MG: 400 (241.3 MG) TAB at 13:47

## 2021-06-17 RX ADMIN — PANTOPRAZOLE SODIUM 40 MG: 40 TABLET, DELAYED RELEASE ORAL at 20:08

## 2021-06-17 RX ADMIN — TAZOBACTAM SODIUM AND PIPERACILLIN SODIUM 4.5 G: 500; 4 INJECTION, SOLUTION INTRAVENOUS at 13:47

## 2021-06-17 RX ADMIN — POTASSIUM CHLORIDE 40 MEQ: 1500 TABLET, EXTENDED RELEASE ORAL at 06:40

## 2021-06-17 RX ADMIN — MAGNESIUM OXIDE TAB 400 MG (241.3 MG ELEMENTAL MG) 400 MG: 400 (241.3 MG) TAB at 05:04

## 2021-06-17 RX ADMIN — VANCOMYCIN HYDROCHLORIDE 1000 MG: 1 INJECTION, POWDER, LYOPHILIZED, FOR SOLUTION INTRAVENOUS at 16:15

## 2021-06-17 ASSESSMENT — ACTIVITIES OF DAILY LIVING (ADL)
ADLS_ACUITY_SCORE: 18

## 2021-06-17 ASSESSMENT — MIFFLIN-ST. JEOR: SCORE: 1626.4

## 2021-06-17 NOTE — PROGRESS NOTES
[facility]    Hospitalist Progress Note      Assessment & Plan   Augie De Jesus is a 60 year old male who was admitted on 6/15/2021.     Principal Problem:    Acute respiratory failure with hypoxia (H)    Assessment: Improving today, file decreased from 60 to 40% in the last 24 hours, chest x-ray clearing, feeling better.  Transferred to ICU on night of 6/15 for initiation of Vapotherm, differential remains includes bacterial pneumonia given initial productive cough, and elevated inflammatory markers CT findings and less likely pulmonary edema contributing given exam and echo.  Diuresed 6/16 with elevation of creatinine today.  No evidence of PE, COPD or ACS playing a role.  Covid negative.  Vlad & Vlad vaccine 1 week ago.    Plan:   -IV vancomycin/Zosyn day 3  -azithromycin day 2  -respiratory viral panel negative  -Follow-up blood cultures  -Repeat sputum culture as initial was contamination   -urine strep and Legionella negative  -Scheduled nebs  -Trend pro calcitonin  -Follow-up histo/blasto urine and blood antigens  -discontinue lasix today  -Wean Vapotherm as able  -Daily chest x-ray     Active Problems:    Hypertension    Assessment: Chronic and stable, not actively treated    Plan:   -monitor     Anemia  Assessment: Chronic and thought to be due to chronic GI losses, probable iron deficiency and chronic disease component in addition, no clear evidence of hemolysis.  No IV iron given active infection, given severe inflammation oral iron unlikely to be of benefit.  Plan:  -Continue PPI  -Avoid NSAIDs  -Trend daily       YARELIS (obstructive sleep apnea)    Assessment: Chronic, not consistently compliant with CPAP given claustrophobia    Plan:   -Appreciate [facility] for CPAP set up once Vapotherm weaned       Alcohol abuse    Assessment: Per report last drink was about a week ago, no withdrawal symptoms    Plan:   -Monitor for evidence of withdrawal and start CIWA if needed       Elevated  troponin    Assessment: Now peaked with no further escalation troponin, no new wall motion abnormalities on TTE and no new EKG findings consistent cardiopulmonary symptoms concerning for ACS.  More likely related to hypoxia and supply demand mismatch.    Plan:   -Trend  -Telemetry    Diet: Regular Diet Adult    DVT Prophylaxis: Pneumatic Compression Devices  Ruiz Catheter: not present  Code Status: Full Code           Disposition Plan   Expected discharge: 3 to 5 days recommended to prior living arrangement once adequate pain management/ tolerating PO medications, antibiotic plan established, hemoglobin stable, mental status at baseline and O2 use less than 4 liters/minute.  Entered: Valdez Adams MD 06/17/2021, 9:34 AM       The patient's care was discussed with the Patient.    Valdez Adams MD  Hospitalist Service  [facility]  Contact information available via [facility] Paging/Directory    ______________________________________________________________________    Interval History   Overnight no acute events and afebrile, FiO2 being weaned, patient feels slightly better than yesterday, no further productive cough, no wheezing, still very dyspneic with any exertion, no chest pain palpitations orthopnea or increased lower extremity edema.  Tolerating regular consistency diet, moved his bowels yesterday, no other new complaints.    -Data reviewed today: I reviewed all new labs and imaging results over the last 24 hours. I personally reviewed chest x-ray today notable for resolving bilateral infiltrates.    Physical Exam   Temp: 99.1  F (37.3  C) Temp src: Temporal BP: 124/78 Pulse: 109   Resp: 22 SpO2: 90 % O2 Device: High Flow Nasal Cannula (HFNC) Oxygen Delivery: 35 LPM  Vitals:    06/15/21 1104 06/16/21 0400 06/17/21 0500   Weight: 91.3 kg (201 lb 4.5 oz) 89.3 kg (196 lb 13.9 oz) 84.8 kg (186 lb 14.4 oz)     Vital Signs with Ranges  Temp:  [96.3  F (35.7  C)-99.1  F (37.3  C)] 99.1  F (37.3   C)  Pulse:  [] 109  Resp:  [16-52] 22  BP: ()/() 124/78  FiO2 (%):  [35 %-45 %] 40 %  SpO2:  [83 %-100 %] 90 %  I/O last 3 completed shifts:  In: 2000 [I.V.:2000]  Out: 4380 [Urine:4380]    Exam:  GENERAL: Talkative, laying in bed, in no apparent distress.  NECK: Supple, jugular venous distension not present.  CARDIOVASCULAR: regular rate and rhythm, no murmurs, rubs, or gallops. Normal S1/S2. No lower extremity edema.   RESPIRATORY:  Diffuse air movement in all fields with no further coarse breath sounds, no wheezes, no crackles, no accessory muscle use or evidence of respiratory distress.   GI: soft, non-tender deep palpation in all quadrants, non-distended, normoactive bowel sounds.  MUSCULOSKELETAL: warm and well perfused, 2+ dorsalis pedis pulses.    SKIN: no pallor, jaundice or rashes.  NEUROLOGY: AAOx3, follows commands, speech and language without focal deficits.        Medications       albuterol  3 mL Nebulization 4x Daily     azithromycin  500 mg Intravenous Daily     buPROPion  300 mg Oral QAM     DULoxetine  60 mg Oral Daily     guaiFENesin  600 mg Oral BID     magnesium oxide  400 mg Oral TID     nicotine   Transdermal Q8H     pantoprazole  40 mg Oral Daily     piperacillin-tazobactam  4.5 g Intravenous Q6H     senna-docusate  1 tablet Oral BID    Or     senna-docusate  2 tablet Oral BID     sodium chloride (PF)  3 mL Intracatheter Q8H       Data   Recent Labs   Lab 06/17/21  0514 06/16/21  1240 06/16/21  0500 06/16/21  0020 06/15/21  1125   WBC 11.9*  --  12.1*  --  10.8   HGB 8.3*  --  7.5* 8.9* 8.3*   MCV 69*  --  69*  --  69*     --  285  --  313   *  --  134 134 133*   POTASSIUM 3.3* 4.0 3.4* 3.3* 3.3*   CHLORIDE 91*  --  96* 97* 98   CO2 28  --  25 23 20*   BUN 12  --  10 12 12   CR 0.94  --  0.86 0.80 0.77   ANIONGAP 13  --  13 14 15*   KUMAR 8.4*  --  7.9* 8.1* 8.4*   *  --  106* 98 109*   ALBUMIN  --   --   --   --  3.3*   PROTTOTAL  --   --   --   --  6.8    BILITOTAL  --   --   --   --  0.9   ALKPHOS  --   --   --   --  135*   ALT  --   --   --   --  38   AST  --   --   --   --  58*       Recent Results (from the past 24 hour(s))   XR Chest Port 1 View    Narrative    PROCEDURE: XR CHEST PORT 1 VIEW 6/17/2021 6:25 AM    HISTORY: f/u pneumonia    COMPARISONS: 6/16/2021.    TECHNIQUE: Single view.    FINDINGS: Heart remains enlarged. There are patchy bilateral lung  infiltrates, most confluent at the lung bases. Overall appearance is  slightly improved when compared to the prior exam. No effusion is  seen.         Impression    IMPRESSION: Slight improvement in previously seen patchy bilateral  infiltrates.    JERRICA RUIZ MD

## 2021-06-17 NOTE — PLAN OF CARE
Remains on vapotherm at 35L and 50% FiO2. Lung sounds clear, diminished in the bases. Reports occasional dry cough. SOB and desats with any activity. Multiple stools throughout the shift, started as soft and formed and transitioned to more liquid towards the end of the shift. Denies any pain. Will continue to monitor.

## 2021-06-17 NOTE — TELEPHONE ENCOUNTER
Left message to call back.Closing note as multiple messages and attempts have been made to reach patient.  Sherie Garcia LPN....................  6/17/2021   8:25 AM

## 2021-06-17 NOTE — PROGRESS NOTES
Pt reports light headedness, when sitting at bedside, SOB with activity, desats to low 80s with activity. LS unchanged through night with fine crackles in bilateral lower lobes.

## 2021-06-17 NOTE — PROGRESS NOTES
SAFETY CHECKLIST  ID Bands and Risk clasps correct and in place (DNR, Fall risk, Allergy, Latex, Limb):  Yes  All Lines Reconciled and labeled correctly: Yes  Whiteboard updated:Yes  Environmental interventions (bed/chair alarm on, call light, side rails, restraints, sitter....): Yes  Verify Tele #:   Resting in bed. Denies any pain. SOB with any activity. Desats with activity. Remains on vapotherm at 35L with 40% FiO2.

## 2021-06-17 NOTE — PHARMACY-PHARMACOTHERAPY NOTE
"Pharmacy Vancomycin Initial Note  Date of Service 2021  Patient's  1960  60 year old, male    Indication: Community Acquired Pneumonia    Current estimated CrCl = Estimated Creatinine Clearance: 89.2 mL/min (based on SCr of 0.94 mg/dL).    Creatinine for last 3 days  6/15/2021: 11:25 AM Creatinine 0.77 mg/dL  2021: 12:20 AM Creatinine 0.80 mg/dL;  5:00 AM Creatinine 0.86 mg/dL  2021:  5:14 AM Creatinine 0.94 mg/dL    Recent Vancomycin Level(s) for last 3 days  No results found for requested labs within last 72 hours.      Vancomycin IV Administrations (past 72 hours)                   vancomycin (VANCOCIN) 1,000 mg in sodium chloride 0.9 % 250 mL intermittent infusion (mg) 1,000 mg New Bag 21 0414     1,000 mg New Bag 21 1557    vancomycin 1500 mg in 0.9% NaCl 500 ml intermittent infusion 1,500 mg (mg) 1,500 mg Given 21 0316    vancomycin 1500 mg in 0.9% NaCl 500 ml intermittent infusion 1,500 mg (mg) 1,500 mg Given 06/15/21 1639                Nephrotoxins and other renal medications (From now, onward)    Start     Dose/Rate Route Frequency Ordered Stop    06/15/21 1800  piperacillin-tazobactam (ZOSYN) intermittent infusion 4.5 g     Note to Pharmacy: For [facility], [facility] and [facility]: For Zosyn-naive patients, use the \"Zosyn initial dose + extended infusion\" order panel.    4.5 g  200 mL/hr over 30 Minutes Intravenous EVERY 6 HOURS 06/15/21 1800            Contrast Orders - past 72 hours (72h ago, onward)    Start     Dose/Rate Route Frequency Ordered Stop    06/15/21 1225  iopamidol (ISOVUE-370) solution 84 mL      84 mL Intravenous ONCE 06/15/21 1221 06/15/21 1238                  Plan:  1. Based on TDM, will continue Vancomycin 1000 mg IV Q12H  2. Vancomycin monitoring method: AUC  3. Vancomycin therapeutic monitoring goal: 400-600 mg*h/L  4. Pharmacy will check vancomycin levels as appropriate in 5-7 Days.    5. Serum creatinine levels will be ordered daily for the first week of " therapy and at least twice weekly for subsequent weeks.      Kolby Hernandes, RPJONH

## 2021-06-18 ENCOUNTER — APPOINTMENT (OUTPATIENT)
Dept: GENERAL RADIOLOGY | Facility: OTHER | Age: 61
End: 2021-06-18
Attending: INTERNAL MEDICINE
Payer: COMMERCIAL

## 2021-06-18 LAB
ALBUMIN SERPL-MCNC: 3.3 G/DL (ref 3.5–5.7)
ALP SERPL-CCNC: 222 U/L (ref 34–104)
ALT SERPL W P-5'-P-CCNC: 24 U/L (ref 7–52)
ANION GAP SERPL CALCULATED.3IONS-SCNC: 15 MMOL/L (ref 3–14)
AST SERPL W P-5'-P-CCNC: 28 U/L (ref 13–39)
BACTERIA SPEC CULT: ABNORMAL
BILIRUB SERPL-MCNC: 1 MG/DL (ref 0.3–1)
BUN SERPL-MCNC: 10 MG/DL (ref 7–25)
CALCIUM SERPL-MCNC: 8.1 MG/DL (ref 8.6–10.3)
CHLORIDE SERPL-SCNC: 94 MMOL/L (ref 98–107)
CO2 SERPL-SCNC: 24 MMOL/L (ref 21–31)
CREAT SERPL-MCNC: 0.82 MG/DL (ref 0.7–1.3)
CRP SERPL-MCNC: 236 MG/L
D DIMER PPP FEU-MCNC: 7.6 UG/ML FEU (ref 0–0.5)
ERYTHROCYTE [DISTWIDTH] IN BLOOD BY AUTOMATED COUNT: 22.4 % (ref 10–15)
GFR SERPL CREATININE-BSD FRML MDRD: >90 ML/MIN/{1.73_M2}
GLUCOSE SERPL-MCNC: 120 MG/DL (ref 70–105)
HCO3 BLD-SCNC: 29 MMOL/L (ref 21–28)
HCT VFR BLD AUTO: 27.9 % (ref 40–53)
HGB BLD-MCNC: 8.2 G/DL (ref 13.3–17.7)
LAB SCANNED RESULT: NORMAL
LABORATORY COMMENT REPORT: NORMAL
LACTATE BLD-SCNC: 1.6 MMOL/L (ref 0.7–2)
MAGNESIUM SERPL-MCNC: 1.9 MG/DL (ref 1.9–2.7)
MCH RBC QN AUTO: 20.7 PG (ref 26.5–33)
MCHC RBC AUTO-ENTMCNC: 29.4 G/DL (ref 31.5–36.5)
MCV RBC AUTO: 71 FL (ref 78–100)
O2/TOTAL GAS SETTING VFR VENT: 5 %
OXYHGB MFR BLD: 94 % (ref 92–100)
PCO2 BLD: 38 MM HG (ref 35–45)
PH BLD: 7.48 PH (ref 7.35–7.45)
PLATELET # BLD AUTO: 366 10E9/L (ref 150–450)
PO2 BLD: 75 MM HG (ref 80–105)
POTASSIUM SERPL-SCNC: 3.5 MMOL/L (ref 3.5–5.1)
PROCALCITONIN SERPL-MCNC: 10.18 NG/ML
PROT SERPL-MCNC: 6.3 G/DL (ref 6.4–8.9)
RBC # BLD AUTO: 3.96 10E12/L (ref 4.4–5.9)
SARS-COV-2 RNA RESP QL NAA+PROBE: NEGATIVE
SODIUM SERPL-SCNC: 133 MMOL/L (ref 134–144)
SPECIMEN SOURCE: ABNORMAL
SPECIMEN SOURCE: NORMAL
WBC # BLD AUTO: 11.4 10E9/L (ref 4–11)

## 2021-06-18 PROCEDURE — U0005 INFEC AGEN DETEC AMPLI PROBE: HCPCS | Performed by: FAMILY MEDICINE

## 2021-06-18 PROCEDURE — 250N000011 HC RX IP 250 OP 636: Performed by: FAMILY MEDICINE

## 2021-06-18 PROCEDURE — 83735 ASSAY OF MAGNESIUM: CPT | Performed by: INTERNAL MEDICINE

## 2021-06-18 PROCEDURE — 94640 AIRWAY INHALATION TREATMENT: CPT

## 2021-06-18 PROCEDURE — 258N000003 HC RX IP 258 OP 636: Performed by: INTERNAL MEDICINE

## 2021-06-18 PROCEDURE — 94640 AIRWAY INHALATION TREATMENT: CPT | Mod: 76

## 2021-06-18 PROCEDURE — 71045 X-RAY EXAM CHEST 1 VIEW: CPT

## 2021-06-18 PROCEDURE — 82805 BLOOD GASES W/O2 SATURATION: CPT | Performed by: FAMILY MEDICINE

## 2021-06-18 PROCEDURE — U0003 INFECTIOUS AGENT DETECTION BY NUCLEIC ACID (DNA OR RNA); SEVERE ACUTE RESPIRATORY SYNDROME CORONAVIRUS 2 (SARS-COV-2) (CORONAVIRUS DISEASE [COVID-19]), AMPLIFIED PROBE TECHNIQUE, MAKING USE OF HIGH THROUGHPUT TECHNOLOGIES AS DESCRIBED BY CMS-2020-01-R: HCPCS | Performed by: FAMILY MEDICINE

## 2021-06-18 PROCEDURE — 200N000001 HC R&B ICU

## 2021-06-18 PROCEDURE — 80053 COMPREHEN METABOLIC PANEL: CPT | Performed by: INTERNAL MEDICINE

## 2021-06-18 PROCEDURE — 250N000013 HC RX MED GY IP 250 OP 250 PS 637: Performed by: INTERNAL MEDICINE

## 2021-06-18 PROCEDURE — 86140 C-REACTIVE PROTEIN: CPT | Performed by: INTERNAL MEDICINE

## 2021-06-18 PROCEDURE — 999N000157 HC STATISTIC RCP TIME EA 10 MIN

## 2021-06-18 PROCEDURE — 83605 ASSAY OF LACTIC ACID: CPT | Performed by: FAMILY MEDICINE

## 2021-06-18 PROCEDURE — 36415 COLL VENOUS BLD VENIPUNCTURE: CPT | Performed by: FAMILY MEDICINE

## 2021-06-18 PROCEDURE — 85379 FIBRIN DEGRADATION QUANT: CPT | Performed by: FAMILY MEDICINE

## 2021-06-18 PROCEDURE — 250N000011 HC RX IP 250 OP 636: Performed by: INTERNAL MEDICINE

## 2021-06-18 PROCEDURE — 99233 SBSQ HOSP IP/OBS HIGH 50: CPT | Performed by: FAMILY MEDICINE

## 2021-06-18 PROCEDURE — 36415 COLL VENOUS BLD VENIPUNCTURE: CPT | Performed by: INTERNAL MEDICINE

## 2021-06-18 PROCEDURE — 250N000009 HC RX 250: Performed by: INTERNAL MEDICINE

## 2021-06-18 PROCEDURE — 84145 PROCALCITONIN (PCT): CPT | Performed by: INTERNAL MEDICINE

## 2021-06-18 PROCEDURE — 85027 COMPLETE CBC AUTOMATED: CPT | Performed by: INTERNAL MEDICINE

## 2021-06-18 RX ORDER — FUROSEMIDE 10 MG/ML
20 INJECTION INTRAMUSCULAR; INTRAVENOUS EVERY 12 HOURS
Status: DISCONTINUED | OUTPATIENT
Start: 2021-06-18 | End: 2021-06-19

## 2021-06-18 RX ADMIN — AZITHROMYCIN MONOHYDRATE 500 MG: 500 INJECTION, POWDER, LYOPHILIZED, FOR SOLUTION INTRAVENOUS at 09:23

## 2021-06-18 RX ADMIN — GUAIFENESIN 600 MG: 600 TABLET, EXTENDED RELEASE ORAL at 09:26

## 2021-06-18 RX ADMIN — LORAZEPAM 1 MG: 0.5 TABLET ORAL at 21:07

## 2021-06-18 RX ADMIN — ALBUTEROL SULFATE 2.5 MG: 2.5 SOLUTION RESPIRATORY (INHALATION) at 18:29

## 2021-06-18 RX ADMIN — ALBUTEROL SULFATE 2.5 MG: 2.5 SOLUTION RESPIRATORY (INHALATION) at 14:46

## 2021-06-18 RX ADMIN — LORAZEPAM 1 MG: 0.5 TABLET ORAL at 15:57

## 2021-06-18 RX ADMIN — ALBUTEROL SULFATE 2.5 MG: 2.5 SOLUTION RESPIRATORY (INHALATION) at 10:32

## 2021-06-18 RX ADMIN — FUROSEMIDE 20 MG: 10 INJECTION, SOLUTION INTRAMUSCULAR; INTRAVENOUS at 21:07

## 2021-06-18 RX ADMIN — ENOXAPARIN SODIUM 40 MG: 100 INJECTION SUBCUTANEOUS at 21:08

## 2021-06-18 RX ADMIN — ENOXAPARIN SODIUM 40 MG: 100 INJECTION SUBCUTANEOUS at 09:43

## 2021-06-18 RX ADMIN — LORAZEPAM 1 MG: 0.5 TABLET ORAL at 09:37

## 2021-06-18 RX ADMIN — TAZOBACTAM SODIUM AND PIPERACILLIN SODIUM 4.5 G: 500; 4 INJECTION, SOLUTION INTRAVENOUS at 07:18

## 2021-06-18 RX ADMIN — GUAIFENESIN 600 MG: 600 TABLET, EXTENDED RELEASE ORAL at 21:07

## 2021-06-18 RX ADMIN — TAZOBACTAM SODIUM AND PIPERACILLIN SODIUM 4.5 G: 500; 4 INJECTION, SOLUTION INTRAVENOUS at 19:59

## 2021-06-18 RX ADMIN — BUPROPION HYDROCHLORIDE 300 MG: 150 TABLET, FILM COATED, EXTENDED RELEASE ORAL at 07:18

## 2021-06-18 RX ADMIN — VANCOMYCIN HYDROCHLORIDE 1000 MG: 1 INJECTION, POWDER, LYOPHILIZED, FOR SOLUTION INTRAVENOUS at 05:00

## 2021-06-18 RX ADMIN — ALBUTEROL SULFATE 2.5 MG: 2.5 SOLUTION RESPIRATORY (INHALATION) at 07:14

## 2021-06-18 RX ADMIN — DULOXETINE HYDROCHLORIDE 60 MG: 30 CAPSULE, DELAYED RELEASE ORAL at 09:26

## 2021-06-18 RX ADMIN — FUROSEMIDE 20 MG: 10 INJECTION, SOLUTION INTRAMUSCULAR; INTRAVENOUS at 09:43

## 2021-06-18 RX ADMIN — PANTOPRAZOLE SODIUM 40 MG: 40 TABLET, DELAYED RELEASE ORAL at 19:59

## 2021-06-18 RX ADMIN — VANCOMYCIN HYDROCHLORIDE 1000 MG: 1 INJECTION, POWDER, LYOPHILIZED, FOR SOLUTION INTRAVENOUS at 15:57

## 2021-06-18 RX ADMIN — TAZOBACTAM SODIUM AND PIPERACILLIN SODIUM 4.5 G: 500; 4 INJECTION, SOLUTION INTRAVENOUS at 13:23

## 2021-06-18 RX ADMIN — TAZOBACTAM SODIUM AND PIPERACILLIN SODIUM 4.5 G: 500; 4 INJECTION, SOLUTION INTRAVENOUS at 01:42

## 2021-06-18 ASSESSMENT — ACTIVITIES OF DAILY LIVING (ADL)
ADLS_ACUITY_SCORE: 16
ADLS_ACUITY_SCORE: 18
ADLS_ACUITY_SCORE: 16
ADLS_ACUITY_SCORE: 16
ADLS_ACUITY_SCORE: 18
ADLS_ACUITY_SCORE: 16

## 2021-06-18 ASSESSMENT — MIFFLIN-ST. JEOR: SCORE: 1594.63

## 2021-06-18 NOTE — PROGRESS NOTES
Pt was up to commode vapotherm on max setting d/t patient desats with activity. Pt recovers slowly. Currently back in bed sats 91% on max settings. Will leave at these settings until sats improve wean as able

## 2021-06-18 NOTE — PROGRESS NOTES
"Patient refusing to go to prone position to help saturation levels. Pt states \" I just can't do that\". RT on phone with MD d/t sats being low for approximately 10mins. After 10 mins sats finally increased to 93%. No new orders from MD. Will leave at max settings for night   "

## 2021-06-18 NOTE — PROGRESS NOTES
"Spoke to MD regarding pt desaturation event. Pt currently maxed out on vapotherm settings, sitting on edge of bed leaned over. RT tried to get patient to do prone position and pt refused saying \"he just couldn't. Don't ceci me, I just absolutely can't do that.\" he did eventually state he \"would feel claustrophobic\".    No new orders at this time. Will call tele-health if the prolonged desaturation event happens again.     Eugenia Mahoney, RT     "

## 2021-06-18 NOTE — ASSESSMENT & PLAN NOTE
Trending downward on admission  Echocardiogram showing good function with no evidence of wall motion abnormalities

## 2021-06-18 NOTE — PROGRESS NOTES
Pt up to edge of bed vapotherm setting increased to max settings of 40L and FIO2 of 100% as pt desats into 70s with activity. pts lungs diminished in bases. Alert orientated denies pain. Will continue to monitor sats and wean fio2 as able. Encouraged proper breathing techniques, pt continues to refuse to lie prone to improve saturation. When resting in bed able to decrease fio2 to 60% this shift

## 2021-06-18 NOTE — PROGRESS NOTES
Pt resting in bed with eyes closed. sats improved to 98%. FIO2 decreased to 80% will continue to wean as able

## 2021-06-18 NOTE — ASSESSMENT & PLAN NOTE
Presenting with 6-month history of shortness of breath worsening prior to admission  Admitted requiring 3 L, has worsened now requiring Vapotherm at 40 L, 100% and still having significant desats with activity  Cause for hypoxemia not clear with cultures and studies negative including Covid, Blasto, histo, viral resp panel, legionella, strep pneumo  Reviewed case with tele-ICU, they suspect viral cause  We re-tested for Covid and was negative  Over nite with increasing oxygen needs, maxed on vapotherm. Trial of bipap not tolerated, still with hypoxmia  Intubated this AM, currently on propofol infusion, oxygenating well  Discussed with critical care specialist, Dr. Td Andersen at [facility] who has accepted in transfer  Will be transferred by air ambulance

## 2021-06-18 NOTE — PLAN OF CARE
Problem: Adult Inpatient Plan of Care  Goal: Plan of Care Review  Outcome: No Change  Goal: Patient-Specific Goal (Individualized)  Outcome: No Change  Goal: Absence of Hospital-Acquired Illness or Injury  Outcome: No Change  Intervention: Identify and Manage Fall Risk  Recent Flowsheet Documentation  Taken 6/18/2021 0000 by Jenn Wilkes RN  Safety Promotion/Fall Prevention:   activity supervised   safety round/check completed  Taken 6/17/2021 2000 by Jenn Wilkes RN  Safety Promotion/Fall Prevention:   activity supervised   safety round/check completed  Intervention: Prevent Skin Injury  Recent Flowsheet Documentation  Taken 6/18/2021 0000 by Jenn Wilkes, RN  Body Position: position changed independently  Taken 6/17/2021 2000 by Jenn Wilkes RN  Body Position: position changed independently  Goal: Optimal Comfort and Wellbeing  Outcome: No Change  Goal: Readiness for Transition of Care  Outcome: No Change     Problem: ARDS (Acute Respiratory Distress Syndrome)  Goal: Effective Oxygenation  Outcome: No Change   Continues to be on vapo therm, requiring max setting with activity d/t desaturation

## 2021-06-18 NOTE — PROGRESS NOTES
Patient sats cont to drop with activity but will recover with rest. No changes to vapotherm. Remains at 35L and 50% FIO2. Denies complaints. IV site changed d/t leaking

## 2021-06-18 NOTE — PROGRESS NOTES
sats at 99% on max settings on vapotherm. fio2 decreased to 80% remnains on 40L will monitor and wean settings as able

## 2021-06-18 NOTE — ASSESSMENT & PLAN NOTE
Presenting with increasing shortness of breath  Cultures negative, studies neg for histo, blasto, viral panel negative, legionella and strep pneumo negative. suspicious for viral pneumonia on imaging  On broad-spectrum antibiotic coverage with Zosyn, Vanco and azithromycin  Kept dry, IV lasix with good urine output

## 2021-06-18 NOTE — PROGRESS NOTES
Patient is extremely intolerant of activity. With exertion of any type, oxygen saturations drop immediately into the 50s/60s. FiO2 increased from 70% to 100% for comfort and support until patient's oxygen saturations sustain in the mid/upper 90s for an extended period of time.

## 2021-06-18 NOTE — ASSESSMENT & PLAN NOTE
Appears to be iron deficiency  Continue PPI, monitor   Pt. reports she rents a room in a house with her . No stairs to enter and no stairs inside. Pt. does not own DME.

## 2021-06-18 NOTE — PROGRESS NOTES
SAFETY CHECKLIST  ID Bands and Risk clasps correct and in place (DNR, Fall risk, Allergy, Latex, Limb):  Yes  All Lines Reconciled and labeled correctly: Yes  Whiteboard updated:Yes  Environmental interventions  Yes call light in reach side rails     Alert orientated denies c/o continues to be on vapotherm sats 93% on current settings. Will continue to monitor

## 2021-06-18 NOTE — PHARMACY-MEDICATION REGIMEN REVIEW
"Pharmacy Vancomycin Note  Date of Service 2021  Patient's  1960   60 year old, male    Indication: CAP  Day of Therapy: 4  Current vancomycin regimen:  1000 mg IV q12h  Current vancomycin monitoring method: AUC  Current vancomycin therapeutic monitoring goal: 400-600 mg*h/L    Current estimated CrCl = Estimated Creatinine Clearance: 100.4 mL/min (based on SCr of 0.82 mg/dL).    Creatinine for last 3 days  2021: 12:20 AM Creatinine 0.80 mg/dL;  5:00 AM Creatinine 0.86 mg/dL  2021:  5:14 AM Creatinine 0.94 mg/dL  2021:  5:30 AM Creatinine 0.82 mg/dL    Recent Vancomycin Levels (past 3 days)  No results found for requested labs within last 72 hours.    Vancomycin IV Administrations (past 72 hours)                   vancomycin (VANCOCIN) 1,000 mg in sodium chloride 0.9 % 250 mL intermittent infusion (mg) 1,000 mg New Bag 21 0500     1,000 mg New Bag 21 1615    vancomycin (VANCOCIN) 1,000 mg in sodium chloride 0.9 % 250 mL intermittent infusion (mg) 1,000 mg New Bag 21 0414     1,000 mg New Bag 21 1557    vancomycin 1500 mg in 0.9% NaCl 500 ml intermittent infusion 1,500 mg (mg) 1,500 mg Given 21 0316    vancomycin 1500 mg in 0.9% NaCl 500 ml intermittent infusion 1,500 mg (mg) 1,500 mg Given 06/15/21 1639                Nephrotoxins and other renal medications (From now, onward)    Start     Dose/Rate Route Frequency Ordered Stop    21 1000  furosemide (LASIX) injection 20 mg      20 mg  over 1-3 Minutes Intravenous EVERY 12 HOURS 21 0936      21 1600  vancomycin (VANCOCIN) 1,000 mg in sodium chloride 0.9 % 250 mL intermittent infusion      1,000 mg  over 90 Minutes Intravenous EVERY 12 HOURS 21 1232      06/15/21 1800  piperacillin-tazobactam (ZOSYN) intermittent infusion 4.5 g     Note to Pharmacy: For [facility], [facility] and [facility]: For Zosyn-naive patients, use the \"Zosyn initial dose + extended infusion\" order panel.    4.5 g  200 mL/hr over 30 " Minutes Intravenous EVERY 6 HOURS 06/15/21 1800               Contrast Orders - past 72 hours (72h ago, onward)    Start     Dose/Rate Route Frequency Ordered Stop    06/15/21 1225  iopamidol (ISOVUE-370) solution 84 mL      84 mL Intravenous ONCE 06/15/21 1221 06/15/21 1238          Plan:  1. Continue Current Dose  2. Vancomycin monitoring method: AUC  3. Vancomycin therapeutic monitoring goal: 400-600 mg*h/L  4. Pharmacy will check vancomycin levels as appropriate in 5-7 Days.  5. Serum creatinine levels will be ordered daily for the first week of therapy and at least twice weekly for subsequent weeks.    Kolby Hernandes [facility]

## 2021-06-18 NOTE — PROGRESS NOTES
[facility]    Medicine Progress Note - Hospitalist Service       Date of Admission:  6/15/2021  Assessment & Plan   60-year-old male admitted on Nancy 15 with respiratory distress requiring supplemental oxygen.  Being treated for community-acquired pneumonia, possible viral component although no organisms or source have been determined.  Currently on broad-spectrum antibiotics with Zosyn/Vanco/azithromycin.  Of concern is worsening respiratory status, currently on Vapotherm at max settings with still significant desaturations with activity.  In addition his inflammatory markers including pro calcitonin are elevating.  Chest x-ray was reviewed with tele-ICU and their concern is that it is a viral process.  They are suggesting a trial of BiPAP support, gentle diuresis and retesting for Covid.  They will continue to monitor.  Discussed with patient.  Acute respiratory failure with hypoxia (H)  Presenting with 6-month history of shortness of breath worsening prior to admission  Admitted requiring 3 L, has worsened now requiring Vapotherm at 40 L, 100% and still having significant desats with activity  Cause for hypoxemia not clear with cultures negative including Covid  Reviewed case with tele-ICU, they suspect viral cause  We tested for Covid  RT to evaluate for initiation of BiPAP support  Gentle diuresis, hold steroids for now    Community acquired bacterial pneumonia  Presenting with increasing shortness of breath  Cultures negative, suspicious for viral pneumonia on imaging  On broad-spectrum antibiotic coverage with Zosyn, Vanco and azithromycin  Continue for now, gentle diuresis, retest for Covid    YARELIS (obstructive sleep apnea)  Using CPAP at home intermittently due to claustrophobia  Monitor, will trial some BiPAP for his worsening respiratory status    Microcytic anemia  Appears to be iron deficiency  Continue PPI, monitor    Alcohol abuse  History of abuse in past, no signs of alcohol  withdrawal    Elevated troponin  Trending downward on admission  Echocardiogram showing good function with no evidence of wall motion abnormalities           Diet: Regular Diet Adult    DVT Prophylaxis: Enoxaparin (Lovenox) SQ  Uriz Catheter: not present  Code Status: Full Code           Disposition Plan   Expected discharge: 4 - 7 days, recommended to prior living arrangement once antibiotic plan established.  Entered: Jhonny Petersen MD 06/18/2021, 9:41 AM       The patient's care was discussed with the Bedside Nurse, Patient and Tele-ICU.    Jhonny Petersen MD  Hospitalist Service  [facility]  Contact information available via [facility] Paging/Directory    ______________________________________________________________________    Interval History   Somewhat worsening respiratory status overnight.  Did not sleep well.  Maxed out on Vapotherm at 40 L, 100% FiO2.  Still feeling short of air with any activity.  Denies chest pain.  Cough is productive.  Denies nausea or vomiting.    Data reviewed today: I reviewed all medications, new labs and imaging results over the last 24 hours. I personally reviewed the chest x-ray image(s) showing No change, bilateral infiltrates versus fluid.    Physical Exam   Vital Signs: Temp: 97.7  F (36.5  C) Temp src: Temporal BP: (!) 145/84 Pulse: 100   Resp: 22 SpO2: 98 % O2 Device: High Flow Nasal Cannula (HFNC)(Vapoherm) Oxygen Delivery: 40 LPM  Weight: 179 lbs 14.33 oz  Constitutional: Appears to be short of air, on nasal cannula high flow oxygen  Respiratory: Crackles bilaterally, no wheezing  Cardiovascular: regular rate and rhythm  GI: normal bowel sounds, soft, non-distended and non-tender    Data   Recent Labs   Lab 06/18/21  0530 06/17/21  1103 06/17/21  0514 06/16/21  0500 06/16/21  0500 06/15/21  1125 06/15/21  1125   WBC 11.4*  --  11.9*  --  12.1*  --  10.8   HGB 8.2*  --  8.3*  --  7.5*   < > 8.3*   MCV 71*  --  69*  --  69*  --  69*      --  358  --  285  --  313   *  --  132*  --  134   < > 133*   POTASSIUM 3.5 3.5 3.3*   < > 3.4*   < > 3.3*   CHLORIDE 94*  --  91*  --  96*   < > 98   CO2 24  --  28  --  25   < > 20*   BUN 10  --  12  --  10   < > 12   CR 0.82  --  0.94  --  0.86   < > 0.77   ANIONGAP 15*  --  13  --  13   < > 15*   KUMAR 8.1*  --  8.4*  --  7.9*   < > 8.4*   *  --  125*  --  106*   < > 109*   ALBUMIN 3.3*  --   --   --   --   --  3.3*   PROTTOTAL 6.3*  --   --   --   --   --  6.8   BILITOTAL 1.0  --   --   --   --   --  0.9   ALKPHOS 222*  --   --   --   --   --  135*   ALT 24  --   --   --   --   --  38   AST 28  --   --   --   --   --  58*    < > = values in this interval not displayed.     Recent Results (from the past 24 hour(s))   XR Chest Port 1 View    Narrative    PROCEDURE: XR CHEST PORT 1 VIEW 6/18/2021 6:22 AM    HISTORY: f/u pneumonia    COMPARISONS: 6/17/2021.    TECHNIQUE: Single view.    FINDINGS: Heart is enlarged but is stable. There are bilateral lung  infiltrates with some air bronchograms at the left lung base. Overall  appearance is similar to the prior exam. No definite effusion is seen.         Impression    IMPRESSION: Bilateral lung infiltrates without significant interval  change.    JERRICA RUIZ MD

## 2021-06-18 NOTE — PROGRESS NOTES
DATE:  6/18/2021   TIME OF RECEIPT FROM LAB:  0634  LAB TEST:  procalcitonin  LAB VALUE:  10.2  RESULTS GIVEN WITH READ-BACK TO (PROVIDER): Dr. Adams, attempted to call and no answer carmen lcall back   TIME LAB VALUE REPORTED TO PROVIDER:   0634 attempted to reach MD will call back

## 2021-06-18 NOTE — PROGRESS NOTES
Pt up to bedside commode sats decreasing to 60s. Vapotherm settings increased to 40L and 60% FIO2. Encouraged pt to take slow deep breaths. RT paged

## 2021-06-18 NOTE — ASSESSMENT & PLAN NOTE
Using CPAP at home intermittently due to claustrophobia  Monitor, will trial some BiPAP for his worsening respiratory status

## 2021-06-19 ENCOUNTER — TRANSFERRED RECORDS (OUTPATIENT)
Dept: HEALTH INFORMATION MANAGEMENT | Facility: CLINIC | Age: 61
End: 2021-06-19

## 2021-06-19 ENCOUNTER — APPOINTMENT (OUTPATIENT)
Dept: GENERAL RADIOLOGY | Facility: OTHER | Age: 61
End: 2021-06-19
Attending: FAMILY MEDICINE
Payer: COMMERCIAL

## 2021-06-19 VITALS
RESPIRATION RATE: 29 BRPM | DIASTOLIC BLOOD PRESSURE: 93 MMHG | HEART RATE: 107 BPM | WEIGHT: 183.42 LBS | TEMPERATURE: 98.8 F | SYSTOLIC BLOOD PRESSURE: 117 MMHG | BODY MASS INDEX: 28.79 KG/M2 | OXYGEN SATURATION: 92 % | HEIGHT: 67 IN

## 2021-06-19 LAB
ALBUMIN SERPL-MCNC: 3 G/DL (ref 3.5–5.7)
ALP SERPL-CCNC: 201 U/L (ref 34–104)
ALT SERPL W P-5'-P-CCNC: 22 U/L (ref 7–52)
ANION GAP SERPL CALCULATED.3IONS-SCNC: 12 MMOL/L (ref 3–14)
AST SERPL W P-5'-P-CCNC: 26 U/L (ref 13–39)
BILIRUB SERPL-MCNC: 0.9 MG/DL (ref 0.3–1)
BUN SERPL-MCNC: 9 MG/DL (ref 7–25)
CALCIUM SERPL-MCNC: 8 MG/DL (ref 8.6–10.3)
CHLORIDE SERPL-SCNC: 92 MMOL/L (ref 98–107)
CO2 SERPL-SCNC: 27 MMOL/L (ref 21–31)
CREAT SERPL-MCNC: 0.79 MG/DL (ref 0.7–1.3)
CRP SERPL-MCNC: 243.6 MG/L
D DIMER PPP FEU-MCNC: 7.2 UG/ML FEU (ref 0–0.5)
ERYTHROCYTE [DISTWIDTH] IN BLOOD BY AUTOMATED COUNT: 22.2 % (ref 10–15)
GFR SERPL CREATININE-BSD FRML MDRD: >90 ML/MIN/{1.73_M2}
GLUCOSE SERPL-MCNC: 116 MG/DL (ref 70–105)
HCO3 BLD-SCNC: 28 MMOL/L (ref 21–28)
HCT VFR BLD AUTO: 26.4 % (ref 40–53)
HGB BLD-MCNC: 7.7 G/DL (ref 13.3–17.7)
MCH RBC QN AUTO: 20.6 PG (ref 26.5–33)
MCHC RBC AUTO-ENTMCNC: 29.2 G/DL (ref 31.5–36.5)
MCV RBC AUTO: 71 FL (ref 78–100)
O2/TOTAL GAS SETTING VFR VENT: 100 %
OXYHGB MFR BLD: 74 % (ref 92–100)
PCO2 BLD: 44 MM HG (ref 35–45)
PH BLD: 7.42 PH (ref 7.35–7.45)
PLATELET # BLD AUTO: 340 10E9/L (ref 150–450)
PO2 BLD: 43 MM HG (ref 80–105)
POTASSIUM SERPL-SCNC: 3.5 MMOL/L (ref 3.5–5.1)
PROT SERPL-MCNC: 6.2 G/DL (ref 6.4–8.9)
RBC # BLD AUTO: 3.73 10E12/L (ref 4.4–5.9)
SODIUM SERPL-SCNC: 131 MMOL/L (ref 134–144)
WBC # BLD AUTO: 11.7 10E9/L (ref 4–11)

## 2021-06-19 PROCEDURE — 85027 COMPLETE CBC AUTOMATED: CPT | Performed by: FAMILY MEDICINE

## 2021-06-19 PROCEDURE — 86140 C-REACTIVE PROTEIN: CPT | Performed by: FAMILY MEDICINE

## 2021-06-19 PROCEDURE — 5A09357 ASSISTANCE WITH RESPIRATORY VENTILATION, LESS THAN 24 CONSECUTIVE HOURS, CONTINUOUS POSITIVE AIRWAY PRESSURE: ICD-10-PCS | Performed by: FAMILY MEDICINE

## 2021-06-19 PROCEDURE — 5A1935Z RESPIRATORY VENTILATION, LESS THAN 24 CONSECUTIVE HOURS: ICD-10-PCS | Performed by: STUDENT IN AN ORGANIZED HEALTH CARE EDUCATION/TRAINING PROGRAM

## 2021-06-19 PROCEDURE — 36600 WITHDRAWAL OF ARTERIAL BLOOD: CPT | Performed by: FAMILY MEDICINE

## 2021-06-19 PROCEDURE — 0BH17EZ INSERTION OF ENDOTRACHEAL AIRWAY INTO TRACHEA, VIA NATURAL OR ARTIFICIAL OPENING: ICD-10-PCS | Performed by: STUDENT IN AN ORGANIZED HEALTH CARE EDUCATION/TRAINING PROGRAM

## 2021-06-19 PROCEDURE — 250N000013 HC RX MED GY IP 250 OP 250 PS 637: Performed by: INTERNAL MEDICINE

## 2021-06-19 PROCEDURE — 94660 CPAP INITIATION&MGMT: CPT

## 2021-06-19 PROCEDURE — 80053 COMPREHEN METABOLIC PANEL: CPT | Performed by: FAMILY MEDICINE

## 2021-06-19 PROCEDURE — 36415 COLL VENOUS BLD VENIPUNCTURE: CPT | Performed by: FAMILY MEDICINE

## 2021-06-19 PROCEDURE — 85379 FIBRIN DEGRADATION QUANT: CPT | Performed by: FAMILY MEDICINE

## 2021-06-19 PROCEDURE — 82805 BLOOD GASES W/O2 SATURATION: CPT | Performed by: FAMILY MEDICINE

## 2021-06-19 PROCEDURE — 250N000011 HC RX IP 250 OP 636: Performed by: INTERNAL MEDICINE

## 2021-06-19 PROCEDURE — 999N000105 HC STATISTIC NO DOCUMENTATION TO SUPPORT CHARGE

## 2021-06-19 PROCEDURE — 999N000157 HC STATISTIC RCP TIME EA 10 MIN

## 2021-06-19 PROCEDURE — 99239 HOSP IP/OBS DSCHRG MGMT >30: CPT | Performed by: FAMILY MEDICINE

## 2021-06-19 PROCEDURE — 250N000011 HC RX IP 250 OP 636: Performed by: FAMILY MEDICINE

## 2021-06-19 PROCEDURE — 999N000065 XR CHEST PORT 1 VIEW

## 2021-06-19 PROCEDURE — 258N000003 HC RX IP 258 OP 636: Performed by: INTERNAL MEDICINE

## 2021-06-19 RX ORDER — LORAZEPAM 2 MG/ML
.5-1 INJECTION INTRAMUSCULAR
Status: DISCONTINUED | OUTPATIENT
Start: 2021-06-19 | End: 2021-06-19 | Stop reason: HOSPADM

## 2021-06-19 RX ORDER — PROPOFOL 10 MG/ML
10-20 INJECTION, EMULSION INTRAVENOUS EVERY 30 MIN PRN
Status: DISCONTINUED | OUTPATIENT
Start: 2021-06-19 | End: 2021-06-19 | Stop reason: HOSPADM

## 2021-06-19 RX ORDER — SODIUM CHLORIDE 9 MG/ML
INJECTION, SOLUTION INTRAVENOUS CONTINUOUS
Status: DISCONTINUED | OUTPATIENT
Start: 2021-06-19 | End: 2021-06-19 | Stop reason: HOSPADM

## 2021-06-19 RX ORDER — PROPOFOL 10 MG/ML
5-75 INJECTION, EMULSION INTRAVENOUS CONTINUOUS
Status: DISCONTINUED | OUTPATIENT
Start: 2021-06-19 | End: 2021-06-19 | Stop reason: HOSPADM

## 2021-06-19 RX ORDER — PROPOFOL 10 MG/ML
5-75 INJECTION, EMULSION INTRAVENOUS CONTINUOUS
Status: DISCONTINUED | OUTPATIENT
Start: 2021-06-19 | End: 2021-06-19

## 2021-06-19 RX ADMIN — PROPOFOL 5 MCG/KG/MIN: 10 INJECTION, EMULSION INTRAVENOUS at 06:09

## 2021-06-19 RX ADMIN — LORAZEPAM 1 MG: 2 INJECTION, SOLUTION INTRAMUSCULAR; INTRAVENOUS at 04:36

## 2021-06-19 RX ADMIN — LORAZEPAM 1 MG: 0.5 TABLET ORAL at 05:28

## 2021-06-19 RX ADMIN — LORAZEPAM 1 MG: 0.5 TABLET ORAL at 01:05

## 2021-06-19 RX ADMIN — VANCOMYCIN HYDROCHLORIDE 1000 MG: 1 INJECTION, POWDER, LYOPHILIZED, FOR SOLUTION INTRAVENOUS at 04:51

## 2021-06-19 RX ADMIN — TAZOBACTAM SODIUM AND PIPERACILLIN SODIUM 4.5 G: 500; 4 INJECTION, SOLUTION INTRAVENOUS at 02:06

## 2021-06-19 ASSESSMENT — MIFFLIN-ST. JEOR: SCORE: 1610.63

## 2021-06-19 ASSESSMENT — ACTIVITIES OF DAILY LIVING (ADL)
ADLS_ACUITY_SCORE: 16
ADLS_ACUITY_SCORE: 16

## 2021-06-19 NOTE — PROGRESS NOTES
"Pt became agitated and pulled his BiPAP mask off stating, \"I can't do this, I just can't do it\" Pt was not able to be re-directed, he refused to wear the mask. This nurse placed the vapotherm on pt at 40 % Fio2. Pt was sating at 26 at this time. Pt unable to raise sats past the 50's, Rapid response called. Pt was intubated.  "

## 2021-06-19 NOTE — PROGRESS NOTES
Pt up to commode, sats dropped to 52, sats slow to recover, pt remains on vapotherm 40 % Fio2. Will continue to monitor.

## 2021-06-19 NOTE — ED PROVIDER NOTES
[facility]    -Intubation    Date/Time: 6/19/2021 5:55 AM  Performed by: Greg Brewster MD  Authorized by: Greg Brewster MD       PRE-PROCEDURE DETAILS     Patient status:  Awake    Mallampati score:  II    Pretreatment meds: etomidate.    Paralytics:  Rocuronium      PROCEDURE DETAILS     Preoxygenation: vapotherm (40 lpm, 100% FiO2)    CPR in progress: no      Intubation method:  Oral    Oral intubation technique:  Video-assisted    Laryngoscope blade:  Mac 4    Tube size (mm):  8.0    Tube type:  Cuffed    Number of attempts:  1    Ventilation between attempts: no      Cricoid pressure: no      Tube visualized through cords: yes      PLACEMENT ASSESSMENT     ETT to teeth:  22 cm    Tube secured with:  ETT sanchez    Breath sounds:  Equal    Placement verification: chest rise, CXR verification and direct visualization      Chest x-ray findings: ETT high; directly visualized and cuff inflated at tracheal inlet; tube replaced over bougie, placed to 26 cm at the teeth, repeat CXR confirmed good position.  PROCEDURE   Patient Tolerance:  Patient tolerated the procedure well with no immediate complications          6/19/2021   [facility]     Greg Brewster MD  06/19/21 0605       Greg Brewster MD  06/19/21 0679

## 2021-06-19 NOTE — PROGRESS NOTES
Called to ICU due to pt pulling off his BiPAP and stating he wasn't putting it back on. Vapotherm placed back on at 40L and 100% with SpO2 in the 50's and dropping. Set up for intubation at this time. 8.0 ETT 24 at the teeth. Chest xray done and unable to see tube. MD replaced ETT with bougie. New 8.0 ETT working properly and secured at 26cm at the teeth. Placement verified, by MD, via Xray, EtCO2 41 and bilat BS. Pt placed on vent with settings of AC14/550/100%/6PEEP. SpO2 95% at this time. Assisted flight crew with transport. No further respiratory interventions. Christy Villanueva RRT

## 2021-06-19 NOTE — PHARMACY - DISCHARGE MEDICATION RECONCILIATION AND EDUCATION
Pharmacy:  Discharge Counseling and Medication Reconciliation    Augie De Jesus  [address]  [address]  [facility] [address]  [phone] (home)   60 year old male  PCP: Thiago Ocasio    Allergies: Hydrocodone    Discharge Counseling:    Pharmacist did not meet with patient prior to discharge, as patient is being transferred to a higher level of care ([facility]).    Summary of Education: N/A- pharmacist did not meet with patient    Materials Provided:  MedCounselor sheets printed from Clinical Pharmacology on: N/A      Thank you for the consult.    Jose Jennings [facility]........June 19, 2021 7:43 AM

## 2021-06-19 NOTE — SIGNIFICANT EVENT
Significant Event Note    Time of event: 6:00 AM June 19, 2021    Description of event:  Rapid response    Plan:  Patient with decreasing o2 sats despite max dosing of vapotherm, trial of bipap. Rapid response called, patient intubated and placed on ventilator.  Care discussed with critical care medicine and [facility]

## 2021-06-19 NOTE — DISCHARGE SUMMARY
[facility]  Hospitalist Discharge Summary      Date of Admission:  6/15/2021  Date of Discharge:  6/19/2021  Discharging Provider: Jhonny Petersen MD      Discharge Diagnoses   Principal Problem:    Acute respiratory failure with hypoxia (H)  Active Problems:    Community acquired bacterial pneumonia    YARELIS (obstructive sleep apnea)    Microcytic anemia    Alcohol abuse    Elevated troponin        Follow-ups Needed After Discharge   Follow-up Appointments     Follow Up and recommended labs and tests      Per [facility]             [facility] Past 30 Days of this Admission     Date and Time Order Name Status Description    6/15/2021 1107 Blood culture Preliminary     6/15/2021 1107 Blood culture Preliminary       These results will be followed up by Dr. Td Andersen    Discharge Disposition   Transferred to [facility]  Condition at discharge: Critical      Hospital Course   Acute respiratory failure with hypoxia (H)  Presenting with 6-month history of shortness of breath worsening prior to admission  Admitted requiring 3 L, has worsened now requiring Vapotherm at 40 L, 100% and still having significant desats with activity  Cause for hypoxemia not clear with cultures and studies negative including Covid, Blasto, histo, viral resp panel, legionella, strep pneumo  Reviewed case with tele-ICU, they suspect viral cause  We re-tested for Covid and was negative  Over nite with increasing oxygen needs, maxed on vapotherm. Trial of bipap not tolerated, still with hypoxmia  Intubated this AM, currently on propofol infusion, oxygenating well  Discussed with critical care specialist, Dr. Td Andersen at [facility] who has accepted in transfer  Will be transferred by air ambulance    Community acquired bacterial pneumonia  Presenting with increasing shortness of breath  Cultures negative, studies neg for histo, blasto, viral panel negative, legionella and strep  pneumo negative. suspicious for viral pneumonia on imaging  On broad-spectrum antibiotic coverage with Zosyn, Vanco and azithromycin  Kept dry, IV lasix with good urine output    YARELIS (obstructive sleep apnea)  Using CPAP at home intermittently due to claustrophobia  Monitor, will trial some BiPAP for his worsening respiratory status    Microcytic anemia  Appears to be iron deficiency  Continue PPI, monitor    Alcohol abuse  History of abuse in past, no signs of alcohol withdrawal    Elevated troponin  Trending downward on admission  Echocardiogram showing good function with no evidence of wall motion abnormalities        Consultations This Hospital Stay   PHARMACY TO DOSE VANCO  PHARMACY TO DOSE VANCO    Code Status   Full Code    Time Spent on this Encounter   I, Jhonny Petersen MD, personally saw the patient today and spent greater than 30 minutes discharging this patient.       Jhonny Petersen MD  [facility]  [address]  [place] [address]  Phone: [phone]  Fax: [phone]  ______________________________________________________________________    Physical Exam   Vital Signs: Temp: 98.8  F (37.1  C) Temp src: Temporal BP: (!) 117/93 Pulse: 107   Resp: 29 SpO2: 92 % O2 Device: High Flow Nasal Cannula (HFNC)(Vapotherm) Oxygen Delivery: 40 LPM  Weight: 183 lbs 6.76 oz  Constitutional: Intubated, sedated  Respiratory: Lungs clear, intubated  Cardiovascular: regular rate and rhythm  GI: normal bowel sounds, soft and non-distended  Skin: no bruising or bleeding and normal skin color, texture, turgor       Primary Care Physician   Thiago Ocasio    Discharge Orders      Reason for your hospital stay    Admitted with increased shortness of breath requiring oxygen support with x-ray showing bilateral pneumonia     Ruiz catheter    To straight gravity drainage. Change catheter every 2 weeks and PRN for leaking or decreased uring output with signs of bladder distention.  DO NOT change catheter without a specific MD order IF diagnosis of benign prostatic hypertrophy (BPH), neurogenic bladder, or other urological conditions     IV access    Peripheral IV.     Follow Up and recommended labs and tests    Per Essential     Activity - Up ad daija     Full Code     Invasive Ventilator    I, the undersigned, certify that the above prescribed supplies are medically necessary for this patient and is both reasonable and necessary in reference to accepted standards of medical and necessary in reference to accepted standards of medical practice in the treatment of this patient's condition and is not prescribed as a convenience.       Significant Results and Procedures   Most Recent 3 CBC's:  Recent Labs   Lab Test 06/19/21  0410 06/18/21  0530 06/17/21  0514   WBC 11.7* 11.4* 11.9*   HGB 7.7* 8.2* 8.3*   MCV 71* 71* 69*    366 358     Most Recent 3 BMP's:  Recent Labs   Lab Test 06/19/21  0410 06/18/21  0530 06/17/21  1103 06/17/21  0514   * 133*  --  132*   POTASSIUM 3.5 3.5 3.5 3.3*   CHLORIDE 92* 94*  --  91*   CO2 27 24  --  28   BUN 9 10  --  12   CR 0.79 0.82  --  0.94   ANIONGAP 12 15*  --  13   KUMAR 8.0* 8.1*  --  8.4*   * 120*  --  125*     Most Recent 2 LFT's:  Recent Labs   Lab Test 06/19/21  0410 06/18/21  0530   AST 26 28   ALT 22 24   ALKPHOS 201* 222*   BILITOTAL 0.9 1.0     Most Recent 3 INR's:  Recent Labs   Lab Test 05/02/17  1546 03/22/17  2147   INR 1.1 1.1     Most Recent 3 Troponin's:No lab results found.  Most Recent 6 Bacteria Isolates From Any Culture (See EPIC Reports for Culture Details):  Recent Labs   Lab Test 06/15/21  2320 06/15/21  1125   CULT Canceled, Test credited  >10 Squamous epithelial cells/low power field indicates oral contamination. Please   recollect.  * No growth after 3 days  No growth after 3 days     Most Recent ABG:  Recent Labs   Lab Test 06/19/21  0600   PH 7.42   PO2 43*   PCO2 44   HCO3 28     Most Recent ESR & CRP:  Recent  Labs   Lab Test 06/19/21  0410 06/15/21  1125 06/15/21  1125   SED  --   --  109*   .6*   < > 317.9*    < > = values in this interval not displayed.   ,   Results for orders placed or performed during the hospital encounter of 06/15/21   XR Chest Port 1 View    Narrative    PROCEDURE:  XR CHEST PORT 1 VIEW    HISTORY: sob. .    COMPARISON:  3/17/2020    FINDINGS:    The cardiomediastinal contours are stable.  Patchy airspace consolidation is present throughout both lower lungs.  No effusion or pneumothorax is identified.      Impression    IMPRESSION:  Finding suggests multifocal pneumonia or other acute  pneumonitis. Recommend follow-up to resolution.      ETHEL MONSALVE MD   CT Chest Pulmonary Embolism w Contrast    Narrative    PROCEDURE: CT CHEST PULMONARY EMBOLISM W CONTRAST 6/15/2021 12:42 PM    HISTORY: PE suspected, high prob    COMPARISONS: None.    TECHNIQUE: Axial postcontrast enhanced images were obtained with  coronal and sagittal MIP images.    FINDINGS: There is excellent opacification of pulmonary vessels. No  pulmonary embolus is seen.     There aren't enlarged mediastinal lymph nodes including right  paratracheal lymph node that measures up to 1.7 cm in short axis  dimension and AP window node which measures 12 mm in short axis  dimension. Subcarinal lymph node enlargement is seen as well. This is  nonspecific but may be reactive given lung changes.    There are diffuse bilateral lung infiltrates many with groundglass  type density.    There are small bilateral pleural effusions. No pericardial effusion  is seen. Heart is enlarged. There is some coronary artery  calcification. There is a moderate size hiatal hernia.    Limited images through the upper abdomen show no suspicious  abnormality. Degenerative changes seen in the spine.           Impression    IMPRESSION:   1. No pulmonary embolus.  2. Diffuse bilateral groundglass opacities, probably due to multifocal  pneumonia. Pulmonary  edema is also possible.  3. The lymph node enlargement which may be reactive related to lung  changes.  4. Hiatal hernia.    JERRICA RUIZ MD   XR Chest Port 1 View    Narrative    PROCEDURE:  XR CHEST PORT 1 VIEW    HISTORY:  f/u pneumonia.     COMPARISON:  Nancy 15, 2021    FINDINGS:   The cardiac silhouette is normal in size. Widespread pulmonary  parenchymal opacities are noted. The pulmonary parenchymal opacities  have worsened as compared to Nancy 15. No pleural effusion or  pneumothorax.      Impression    IMPRESSION:  Worsening bilateral pulmonary parenchymal opacities from  Nancy 15, 2021      SANDY ORELLANA MD   XR Chest Port 1 View    Narrative    PROCEDURE: XR CHEST PORT 1 VIEW 2021 6:25 AM    HISTORY: f/u pneumonia    COMPARISONS: 2021.    TECHNIQUE: Single view.    FINDINGS: Heart remains enlarged. There are patchy bilateral lung  infiltrates, most confluent at the lung bases. Overall appearance is  slightly improved when compared to the prior exam. No effusion is  seen.         Impression    IMPRESSION: Slight improvement in previously seen patchy bilateral  infiltrates.    JERRICA RUZI MD   XR Chest Port 1 View    Narrative    PROCEDURE: XR CHEST PORT 1 VIEW 2021 6:22 AM    HISTORY: f/u pneumonia    COMPARISONS: 2021.    TECHNIQUE: Single view.    FINDINGS: Heart is enlarged but is stable. There are bilateral lung  infiltrates with some air bronchograms at the left lung base. Overall  appearance is similar to the prior exam. No definite effusion is seen.         Impression    IMPRESSION: Bilateral lung infiltrates without significant interval  change.    JERRICA RUIZ MD   Echocardiogram Complete    Narrative    [id number]  [id number]  [id number]  [id number]^JF^SANCHO^A     [facility]  [address] [place] [address]     Name: CAT GORDON  MRN: [id number]  : 1960  Study Date: 06/15/2021 02:20 PM  Age: 60 yrs  Gender:  Male  Patient Location: [facility]  Reason For Study: CHF  Ordering Physician: SANCHO RHOADES  Performed By: Julee Harrison RDCS, RVT     BSA: 2.0 m2  Height: 67 in  Weight: 201 lb  HR: 105  BP: 134/98 mmHg  ______________________________________________________________________________  Procedure  Complete Portable Echo Adult.  ______________________________________________________________________________  Interpretation Summary  Global and regional left ventricular function is normal with an EF of 55-60%.  The right ventricle is normal size. Global right ventricular function is  normal.  The inferior vena cava was normal in size with preserved respiratory  variability.  No pericardial effusion is present.     This study was compared with the study from 4/13/2020. No significant changes  noted.  ______________________________________________________________________________  Left Ventricle  Global and regional left ventricular function is normal with an EF of 55-60%.  Mild concentric wall thickening consistent with left ventricular hypertrophy  is present. Diastolic function not assessed due to tachycardia.     Right Ventricle  The right ventricle is normal size. Global right ventricular function is  normal.     Atria  Both atria appear normal.     Mitral Valve  Mild mitral annular calcification is present. Trace mitral insufficiency is  present.     Aortic Valve  The aortic valve is tricuspid. On Doppler interrogation, there is no  significant stenosis or regurgitation.     Tricuspid Valve  The tricuspid valve is normal. Trace tricuspid insufficiency is present. The  right ventricular systolic pressure is approximated at 25.4 mmHg plus the  right atrial pressure. Pulmonary artery systolic pressure is normal.     Pulmonic Valve  The pulmonic valve is normal. Trace pulmonic insufficiency is present.     Vessels  The aorta root is normal. The thoracic aorta is normal. The inferior vena cava  was normal in size with  preserved respiratory variability.     Pericardium  No pericardial effusion is present.     Compared to Previous Study  This study was compared with the study from 2020 . No significant changes  noted.     ______________________________________________________________________________  MMode/2D Measurements & Calculations  IVSd: 1.4 cm  LVIDd: 3.5 cm  LVIDs: 2.3 cm  LVPWd: 1.1 cm  FS: 34.6 %     LV mass(C)d: 148.3 grams  LV mass(C)dI: 73.2 grams/m2  Ao root diam: 3.1 cm  asc Aorta Diam: 3.4 cm  LVOT diam: 1.9 cm  LVOT area: 2.8 cm2  LA Volume (BP): 62.6 ml  LA Volume Index (BP): 30.8 ml/m2  RWT: 0.65     Doppler Measurements & Calculations  MV E max yash: 107.0 cm/sec  MV A max yash: 150.0 cm/sec  MV E/A: 0.71     MV dec slope: 562.0 cm/sec2  MV dec time: 0.19 sec  Ao V2 max: 139.0 cm/sec  Ao max P.0 mmHg  KELSI(V,D): 2.5 cm2  LV V1 max P.1 mmHg  LV V1 max: 123.0 cm/sec  LV V1 VTI: 22.2 cm  SV(LVOT): 62.9 ml  SI(LVOT): 31.1 ml/m2  TR max yash: 252.0 cm/sec  TR max P.4 mmHg  AV Yash Ratio (DI): 0.88  E/E' av.2  Lateral E/e': 10.4  Medial E/e': 14.0     ______________________________________________________________________________  Report approved by: MD Deshaun Appiah 06/15/2021 02:56 PM               Discharge Medications   Current Discharge Medication List      CONTINUE these medications which have NOT CHANGED    Details   acamprosate (CAMPRAL) 333 MG EC tablet Take 2 tablets (666 mg) by mouth 3 times daily  Qty: 180 tablet, Refills: 11    Associated Diagnoses: Alcoholism (H)      buPROPion (WELLBUTRIN XL) 300 MG 24 hr tablet Take 1 tablet (300 mg) by mouth every morning  Qty: 30 tablet, Refills: 11    Associated Diagnoses: Major depressive disorder with single episode, remission status unspecified      DULoxetine (CYMBALTA) 60 MG capsule TAKE 1 CAPSULE (60 MG) BY MOUTH DAILY  Qty: 90 capsule, Refills: 3    Associated Diagnoses: Major depressive disorder with single episode,  remission status unspecified      naproxen (NAPROSYN) 500 MG tablet Take 1 tablet (500 mg) by mouth 2 times daily (with meals)  Qty: 60 tablet, Refills: 3    Associated Diagnoses: Myalgia      omeprazole 20 MG tablet TAKE 1 TABLET BY MOUTH EVERY DAY  Qty: 90 tablet, Refills: 2    Associated Diagnoses: Gastroesophageal reflux disease, unspecified whether esophagitis present      acetaminophen (TYLENOL) 500 MG tablet Take 2 tablets (1,000 mg) by mouth every 8 hours as needed for mild pain  Qty: 100 tablet, Refills: 3    Associated Diagnoses: Trochanteric bursitis of both hips      bisacodyl (DULCOLAX) 5 MG EC tablet Use as directed for colonoscopy prep  Qty: 2 tablet, Refills: 0    Associated Diagnoses: Encounter for screening colonoscopy      diclofenac (VOLTAREN) 75 MG EC tablet Take by mouth 2 times daily as needed for moderate pain Hold while on naproxen      oxyCODONE (ROXICODONE) 5 MG tablet Take 5 mg by mouth every 6 hours as needed for severe pain      polyethylene glycol (MIRALAX) 17 GM/Dose powder Mix with 255g with 64 oz of Gatorade (not red or purple) drink at a rate of 8oz every 10 min as directed for colonoscopy prep  Qty: 510 g, Refills: 0    Associated Diagnoses: Encounter for screening colonoscopy      traZODone (DESYREL) 50 MG tablet Take  mg by mouth nightly as needed for sleep           Allergies   Allergies   Allergen Reactions     Hydrocodone Itching

## 2021-06-19 NOTE — PROGRESS NOTES
Pt transferred to [facility]. Pt transported by [employer]. Report given to Anita PIERCE., All pt belongings sent with pt. Attempted to contact Pt's emergency contact, no answer, left message for her to call ICU for update.

## 2021-06-19 NOTE — PROGRESS NOTES
Pt desating to mid 50's, not able to recover past mid 80's.MD notified. MD states to start BiPAP. RT notified.

## 2021-06-21 ENCOUNTER — PATIENT OUTREACH (OUTPATIENT)
Dept: CARE COORDINATION | Facility: CLINIC | Age: 61
End: 2021-06-21

## 2021-06-21 LAB
BACTERIA SPEC CULT: NORMAL
BACTERIA SPEC CULT: NORMAL
SPECIMEN SOURCE: NORMAL
SPECIMEN SOURCE: NORMAL

## 2021-06-21 NOTE — PROGRESS NOTES
Patient transferred out to [facility] ICU.  No follow up or TCM call made. Norma Emmanuel RN on 6/21/2021 at 9:32 AM

## 2021-12-20 NOTE — TELEPHONE ENCOUNTER
Problem: Patient/Family Goals  Goal: Patient/Family Long Term Goal  Description: Patient's Long Term Goal: discharge home    Interventions:  - follow poc: CT abdomen, inhalers, supplemental o2, pain management   - See additional Care Plan goals for speci Spoke to patient and informed him echocardiogram was normal.  Patient is interested in pursuing an endoscopy at this time.  Surgery referral joshua'd up.  Annabelle Floyd LPN ---- 4/15/2020 12:52 PM   patient reports new pain  - Anticipate increased pain with activity and pre-medicate as appropriate  Outcome: Progressing     Problem: RISK FOR INFECTION - ADULT  Goal: Absence of fever/infection during anticipated neutropenic period  Description: Vilma Heard Barrier  Goal: Patient/Family is able to understand and participate in their care  Description: Interventions:  - Assess communication ability and preferred communication style  - Implement communication aides and strategies  - Use visual cues when possibl

## 2023-06-26 NOTE — TELEPHONE ENCOUNTER
Patient notified via phone Cardiology has attempted to contact him to schedule. Cardiology number given to patient for him to call Cardiology to schedule.    Stable based on symptoms and exam  Continue to follow with gastroenterology

## 2023-09-28 NOTE — TELEPHONE ENCOUNTER
After verifying pts name and date of birth with pt, pt was requesting an appointment with Dr. Ocasio to follow up with his test results. Pt notified Dr. Ocasio is not in clinic but is more than welcome to come in and follow up with another physician to discuss his concerns. Pt states understanding.  Macy Phelps LPN    
Pt would like to discuss results and find out what the next step should be.  
Returned your call  
Clear

## (undated) DEVICE — ENDO BRUSH CHANNEL MASTER CLEANING 2-4.2MM BW-412T

## (undated) DEVICE — SUCTION MANIFOLD NEPTUNE 2 SYS 4 PORT 0702-020-000

## (undated) DEVICE — TUBING SUCTION 10'X3/16" N510

## (undated) DEVICE — SOL WATER 1500ML

## (undated) DEVICE — ENDO KIT COMPLIANCE DYKENDOCMPLY

## (undated) RX ORDER — ASPIRIN 81 MG/1
TABLET, CHEWABLE ORAL
Status: DISPENSED
Start: 2021-06-15

## (undated) RX ORDER — HYDROMORPHONE HYDROCHLORIDE 1 MG/ML
INJECTION, SOLUTION INTRAMUSCULAR; INTRAVENOUS; SUBCUTANEOUS
Status: DISPENSED
Start: 2021-04-25

## (undated) RX ORDER — CEFTRIAXONE SODIUM 1 G/50ML
INJECTION, SOLUTION INTRAVENOUS
Status: DISPENSED
Start: 2021-06-15

## (undated) RX ORDER — LORAZEPAM 2 MG/ML
INJECTION INTRAMUSCULAR
Status: DISPENSED
Start: 2021-04-25

## (undated) RX ORDER — PROPOFOL 10 MG/ML
INJECTION, EMULSION INTRAVENOUS
Status: DISPENSED
Start: 2018-03-16

## (undated) RX ORDER — SODIUM CHLORIDE 9 MG/ML
INJECTION, SOLUTION INTRAVENOUS
Status: DISPENSED
Start: 2021-06-15

## (undated) RX ORDER — CYCLOBENZAPRINE HCL 10 MG
TABLET ORAL
Status: DISPENSED
Start: 2021-04-25

## (undated) RX ORDER — ACETAMINOPHEN 325 MG/1
TABLET ORAL
Status: DISPENSED
Start: 2021-04-25

## (undated) RX ORDER — LIDOCAINE HYDROCHLORIDE 20 MG/ML
INJECTION, SOLUTION EPIDURAL; INFILTRATION; INTRACAUDAL; PERINEURAL
Status: DISPENSED
Start: 2018-03-16

## (undated) RX ORDER — PROPOFOL 10 MG/ML
INJECTION, EMULSION INTRAVENOUS
Status: DISPENSED
Start: 2021-06-19

## (undated) RX ORDER — KETOROLAC TROMETHAMINE 30 MG/ML
INJECTION, SOLUTION INTRAMUSCULAR; INTRAVENOUS
Status: DISPENSED
Start: 2021-04-25

## (undated) RX ORDER — MAGNESIUM SULFATE HEPTAHYDRATE 40 MG/ML
INJECTION, SOLUTION INTRAVENOUS
Status: DISPENSED
Start: 2021-03-30

## (undated) RX ORDER — FUROSEMIDE 10 MG/ML
INJECTION INTRAMUSCULAR; INTRAVENOUS
Status: DISPENSED
Start: 2021-06-15

## (undated) RX ORDER — SODIUM CHLORIDE 9 MG/ML
INJECTION, SOLUTION INTRAVENOUS
Status: DISPENSED
Start: 2021-03-30

## (undated) RX ORDER — PANTOPRAZOLE SODIUM 40 MG/10ML
INJECTION, POWDER, LYOPHILIZED, FOR SOLUTION INTRAVENOUS
Status: DISPENSED
Start: 2021-03-30

## (undated) RX ORDER — ONDANSETRON 2 MG/ML
INJECTION INTRAMUSCULAR; INTRAVENOUS
Status: DISPENSED
Start: 2021-04-25

## (undated) RX ORDER — LORAZEPAM 2 MG/ML
INJECTION INTRAMUSCULAR
Status: DISPENSED
Start: 2021-06-15

## (undated) RX ORDER — LORAZEPAM 2 MG/ML
INJECTION INTRAMUSCULAR
Status: DISPENSED
Start: 2021-03-30